# Patient Record
Sex: FEMALE | Race: WHITE | NOT HISPANIC OR LATINO | Employment: OTHER | URBAN - METROPOLITAN AREA
[De-identification: names, ages, dates, MRNs, and addresses within clinical notes are randomized per-mention and may not be internally consistent; named-entity substitution may affect disease eponyms.]

---

## 2017-02-16 ENCOUNTER — TELEPHONE (OUTPATIENT)
Dept: ANTICOAGULATION | Facility: CLINIC | Age: 67
End: 2017-02-16

## 2017-02-16 NOTE — TELEPHONE ENCOUNTER
Writer called patient and left a message that she is due for an INR check. Writer requested she call [facility] back when she received the message. [place] at this [place] - it is possible she had the lab work without them sending it to [facility]. Will await patient's call.    Yeni Payan RN  [facility]  [phone]

## 2017-03-01 ENCOUNTER — ANTICOAGULATION THERAPY VISIT (OUTPATIENT)
Dept: ANTICOAGULATION | Facility: CLINIC | Age: 67
End: 2017-03-01
Payer: COMMERCIAL

## 2017-03-01 DIAGNOSIS — Z79.01 LONG TERM CURRENT USE OF ANTICOAGULANT THERAPY: ICD-10-CM

## 2017-03-01 LAB — INR PPP: 3.6

## 2017-03-01 PROCEDURE — 99207 ZZC NO CHARGE NURSE ONLY: CPT | Performed by: REGISTERED NURSE

## 2017-03-01 NOTE — PROGRESS NOTES
ANTICOAGULATION FOLLOW-UP CLINIC VISIT    Patient Name:  Radha Corona  Date:  3/1/2017  Contact Type:  INR reported to this writer by Mary Ann MILLER RN. Writer called and left pt non-detailed VM on phone listed. Will await return call.     SUBJECTIVE:        OBJECTIVE    INR   Date Value Ref Range Status   03/01/2017 3.6  Final       ASSESSMENT / PLAN  No question data found.  Anticoagulation Summary as of 3/1/2017     INR goal 2.0-3.0   Today's INR 3.6!   Maintenance plan 2.5 mg (2.5 mg x 1) on Mon, Wed, Fri; 5 mg (2.5 mg x 2) all other days   Full instructions 2.5 mg on Mon, Wed, Fri; 5 mg all other days   Weekly total 27.5 mg   Plan last modified Melissa Mcmillan RN (3/24/2015)   Next INR check 3/2/2017   Priority INR   Target end date Indefinite    Indications   Cerebral infarction (H) [I63.9]  Atrial fibrillation [427.31] [I48.91]  Long term current use of anticoagulant therapy [Z79.01]         Anticoagulation Episode Summary     INR check location     Preferred lab     Send INR reminders to [facility] PO[facility]    Comments * 2.5mg tablets. 12/15: snowbird letter given, will be in [place] for 3 months      Anticoagulation Care Providers     Provider Role Specialty Phone number    Myrna Staples APRN CNP Responsible Nurse Practitioner [phone]            See the Encounter Report to view Anticoagulation Flowsheet and Dosing Calendar (Go to Encounters tab in chart review, and find the Anticoagulation Therapy Visit)    Susan Jeronimo RN

## 2017-03-01 NOTE — MR AVS SNAPSHOT
Radha Corona   3/1/2017   Anticoagulation Therapy Visit    Description:  66 year old female   Provider:  Susan Jeronimo, RN   Department:  [facility]           INR as of 3/1/2017     Today's INR 3.6!      Anticoagulation Summary as of 3/1/2017     INR goal 2.0-3.0   Today's INR 3.6!   Full instructions 2.5 mg on Mon, Wed, Fri; 5 mg all other days   Next INR check 3/2/2017    Indications   Cerebral infarction (H) [I63.9]  Atrial fibrillation [427.31] [I48.91]  Long term current use of anticoagulant therapy [Z79.01]         March 2017 Details    Sun Mon Tue Wed Thu Fri Sat        1      2.5 mg   See details      2            3               4                 5               6               7               8               9               10               11                 12               13               14               15               16               17               18                 19               20               21               22               23               24               25                 26               27               28               29               30               31                 Date Details   03/01 This INR check       Date of next INR:  3/2/2017         How to take your warfarin dose     To take:  2.5 mg Take 1 of the 2.5 mg tablets.    To take:  5 mg Take 2 of the 2.5 mg tablets.

## 2017-03-02 ENCOUNTER — ANTICOAGULATION THERAPY VISIT (OUTPATIENT)
Dept: ANTICOAGULATION | Facility: CLINIC | Age: 67
End: 2017-03-02
Payer: COMMERCIAL

## 2017-03-02 DIAGNOSIS — Z79.01 LONG TERM CURRENT USE OF ANTICOAGULANT THERAPY: ICD-10-CM

## 2017-03-02 PROCEDURE — 99207 ZZC NO CHARGE NURSE ONLY: CPT | Performed by: REGISTERED NURSE

## 2017-03-02 NOTE — MR AVS SNAPSHOT
Radha Corona   3/2/2017   Anticoagulation Therapy Visit    Description:  66 year old female   Provider:  Susan Jeronimo, RN   Department:  [facility]           INR as of 3/2/2017     Today's INR No new INR was available at the time of this encounter.      Anticoagulation Summary as of 3/2/2017     INR goal 2.0-3.0   Today's INR No new INR was available at the time of this encounter.   Full instructions 3/2: 2.5 mg; Otherwise 2.5 mg on Mon, Wed, Fri; 5 mg all other days   Next INR check 4/27/2017    Indications   Cerebral infarction (H) [I63.9]  Atrial fibrillation [427.31] [I48.91]  Long term current use of anticoagulant therapy [Z79.01]         Description     Try to have greens 1-2x/week.  Take 2.5mg Thurs 3/2/2017 then resume maintenance dose.  Recheck INR in 8 weeks.      March 2017 Details    Sun Mon Tue Wed Thu Fri Sat        1               2      2.5 mg   See details      3      2.5 mg         4      5 mg           5      5 mg         6      2.5 mg         7      5 mg         8      2.5 mg         9      5 mg         10      2.5 mg         11      5 mg           12      5 mg         13      2.5 mg         14      5 mg         15      2.5 mg         16      5 mg         17      2.5 mg         18      5 mg           19      5 mg         20      2.5 mg         21      5 mg         22      2.5 mg         23      5 mg         24      2.5 mg         25      5 mg           26      5 mg         27      2.5 mg         28      5 mg         29      2.5 mg         30      5 mg         31      2.5 mg           Date Details   03/02 This INR check               How to take your warfarin dose     To take:  2.5 mg Take 1 of the 2.5 mg tablets.    To take:  5 mg Take 2 of the 2.5 mg tablets.           April 2017 Details    Sun Mon Tue Wed Thu Fri Sat           1      5 mg           2      5 mg         3      2.5 mg         4      5 mg         5      2.5 mg         6      5 mg         7      2.5 mg         8      5  mg           9      5 mg         10      2.5 mg         11      5 mg         12      2.5 mg         13      5 mg         14      2.5 mg         15      5 mg           16      5 mg         17      2.5 mg         18      5 mg         19      2.5 mg         20      5 mg         21      2.5 mg         22      5 mg           23      5 mg         24      2.5 mg         25      5 mg         26      2.5 mg         27            28               29                 30                      Date Details   No additional details    Date of next INR:  4/27/2017         How to take your warfarin dose     To take:  2.5 mg Take 1 of the 2.5 mg tablets.    To take:  5 mg Take 2 of the 2.5 mg tablets.

## 2017-03-02 NOTE — PROGRESS NOTES
ANTICOAGULATION FOLLOW-UP CLINIC VISIT    Patient Name:  Radha Corona  Date:  3/2/2017  Contact Type:  Telephone/ pt returned call to [facility] today and discussed with writer    SUBJECTIVE:     Patient Findings     Positives Change in diet/appetite (not having usual veggies/greens. Fast food.), No Problem Findings    Comments Pt is in [place] and will return the end of March.           OBJECTIVE    INR   Date Value Ref Range Status   03/01/2017 3.6  Final       ASSESSMENT / PLAN  INR assessment SUPRA reduce dose x1 day   Recheck INR In: 8 WEEKS    INR Location Outside lab result from 3/1/17     Anticoagulation Summary as of 3/2/2017     INR goal 2.0-3.0   Today's INR No new INR was available at the time of this encounter.   Maintenance plan 2.5 mg (2.5 mg x 1) on Mon, Wed, Fri; 5 mg (2.5 mg x 2) all other days   Full instructions 3/2: 2.5 mg; Otherwise 2.5 mg on Mon, Wed, Fri; 5 mg all other days   Weekly total 27.5 mg   Plan last modified Melissa Mcmillan RN (3/24/2015)   Next INR check 4/27/2017   Priority INR   Target end date Indefinite    Indications   Cerebral infarction (H) [I63.9]  Atrial fibrillation [427.31] [I48.91]  Long term current use of anticoagulant therapy [Z79.01]         Anticoagulation Episode Summary     INR check location     Preferred lab     Send INR reminders to [facility]    Comments * 2.5mg tablets. 12/15: snowbird letter given, will be in [place] for 3 months      Anticoagulation Care Providers     Provider Role Specialty Phone number    Myrna Staples APRN CNP Responsible Nurse Practitioner [phone]            See the Encounter Report to view Anticoagulation Flowsheet and Dosing Calendar (Go to Encounters tab in chart review, and find the Anticoagulation Therapy Visit)    Susan Jeronimo RN

## 2017-03-03 DIAGNOSIS — I48.20 CHRONIC ATRIAL FIBRILLATION (H): ICD-10-CM

## 2017-03-03 DIAGNOSIS — Z79.01 LONG TERM (CURRENT) USE OF ANTICOAGULANTS: ICD-10-CM

## 2017-03-03 RX ORDER — WARFARIN SODIUM 2.5 MG/1
TABLET ORAL
Qty: 132 TABLET | Refills: 0 | Status: SHIPPED | OUTPATIENT
Start: 2017-03-03 | End: 2017-05-15

## 2017-03-03 NOTE — TELEPHONE ENCOUNTER
Warfarin    Last Written Prescription Date: 12/15/16  Last Fill Qty: 140, # refills: 0  Last Office Visit with G, P or [facility] prescribing provider: 12/13/16       Date and Result of Last PT/INR:   Lab Results   Component Value Date    INR 3.6 03/01/2017    INR 2.8 12/15/2016    INR 2.8 11/03/2016    INR 2.9 11/12/2009    INR 2.9 11/12/2009

## 2017-04-19 ENCOUNTER — TRANSFERRED RECORDS (OUTPATIENT)
Dept: HEALTH INFORMATION MANAGEMENT | Facility: CLINIC | Age: 67
End: 2017-04-19

## 2017-05-03 ENCOUNTER — ANTICOAGULATION THERAPY VISIT (OUTPATIENT)
Dept: ANTICOAGULATION | Facility: CLINIC | Age: 67
End: 2017-05-03
Payer: COMMERCIAL

## 2017-05-03 DIAGNOSIS — I48.91 ATRIAL FIBRILLATION (H): ICD-10-CM

## 2017-05-03 DIAGNOSIS — Z79.01 LONG TERM CURRENT USE OF ANTICOAGULANT THERAPY: ICD-10-CM

## 2017-05-03 DIAGNOSIS — I63.9 CEREBRAL INFARCTION (H): ICD-10-CM

## 2017-05-03 LAB — INR POINT OF CARE: 2.9 (ref 0.86–1.14)

## 2017-05-03 PROCEDURE — 36416 COLLJ CAPILLARY BLOOD SPEC: CPT

## 2017-05-03 PROCEDURE — 85610 PROTHROMBIN TIME: CPT | Mod: QW

## 2017-05-03 PROCEDURE — 99207 ZZC NO CHARGE NURSE ONLY: CPT

## 2017-05-03 NOTE — PROGRESS NOTES
ANTICOAGULATION FOLLOW-UP CLINIC VISIT    Patient Name:  Radha Corona  Date:  5/3/2017  Contact Type:  Face to Face    SUBJECTIVE:     Patient Findings     Positives No Problem Findings           OBJECTIVE    INR Protime   Date Value Ref Range Status   05/03/2017 2.9 (A) 0.86 - 1.14 Final       ASSESSMENT / PLAN  INR assessment THER    Recheck INR In: 8 WEEKS    INR Location Clinic      Anticoagulation Summary as of 5/3/2017     INR goal 2.0-3.0   Today's INR 2.9   Maintenance plan 2.5 mg (2.5 mg x 1) on Mon, Wed, Fri; 5 mg (2.5 mg x 2) all other days   Full instructions 2.5 mg on Mon, Wed, Fri; 5 mg all other days   Weekly total 27.5 mg   No change documented Mary Ann Meng, RN   Plan last modified Melissa Mcmillan RN (3/24/2015)   Next INR check 6/28/2017   Priority INR   Target end date Indefinite    Indications   Cerebral infarction (H) [I63.9]  Atrial fibrillation [427.31] [I48.91]  Long term current use of anticoagulant therapy [Z79.01]         Anticoagulation Episode Summary     INR check location     Preferred lab     Send INR reminders to [facility]    Comments * 2.5mg tablets. 12/15: snowbird letter given, will be in [place] for 3 months      Anticoagulation Care Providers     Provider Role Specialty Phone number    Myrna Staples, JANICE CNP Responsible Nurse Practitioner [phone]            See the Encounter Report to view Anticoagulation Flowsheet and Dosing Calendar (Go to Encounters tab in chart review, and find the Anticoagulation Therapy Visit)        Mary Ann Meng, RN

## 2017-05-09 ENCOUNTER — TELEPHONE (OUTPATIENT)
Dept: DERMATOLOGY | Facility: CLINIC | Age: 67
End: 2017-05-09

## 2017-05-09 ENCOUNTER — OFFICE VISIT (OUTPATIENT)
Dept: FAMILY MEDICINE | Facility: CLINIC | Age: 67
End: 2017-05-09
Payer: COMMERCIAL

## 2017-05-09 ENCOUNTER — OFFICE VISIT (OUTPATIENT)
Dept: DERMATOLOGY | Facility: CLINIC | Age: 67
End: 2017-05-09
Payer: COMMERCIAL

## 2017-05-09 VITALS
WEIGHT: 226 LBS | TEMPERATURE: 97.4 F | DIASTOLIC BLOOD PRESSURE: 86 MMHG | HEART RATE: 80 BPM | SYSTOLIC BLOOD PRESSURE: 136 MMHG | BODY MASS INDEX: 34.36 KG/M2

## 2017-05-09 VITALS — DIASTOLIC BLOOD PRESSURE: 67 MMHG | SYSTOLIC BLOOD PRESSURE: 113 MMHG | OXYGEN SATURATION: 97 % | HEART RATE: 70 BPM

## 2017-05-09 DIAGNOSIS — D23.5 BENIGN NEOPLASM OF SKIN OF TRUNK, EXCEPT SCROTUM: ICD-10-CM

## 2017-05-09 DIAGNOSIS — L82.0 INFLAMED SEBORRHEIC KERATOSIS: ICD-10-CM

## 2017-05-09 DIAGNOSIS — Z11.59 NEED FOR HEPATITIS C SCREENING TEST: ICD-10-CM

## 2017-05-09 DIAGNOSIS — L73.8 SENILE SEBACEOUS GLAND HYPERPLASIA: ICD-10-CM

## 2017-05-09 DIAGNOSIS — E03.8 OTHER SPECIFIED HYPOTHYROIDISM: ICD-10-CM

## 2017-05-09 DIAGNOSIS — L81.4 LENTIGO: ICD-10-CM

## 2017-05-09 DIAGNOSIS — L82.1 SK (SEBORRHEIC KERATOSIS): Primary | ICD-10-CM

## 2017-05-09 DIAGNOSIS — H10.11 ALLERGIC CONJUNCTIVITIS, RIGHT EYE: ICD-10-CM

## 2017-05-09 DIAGNOSIS — E78.5 HYPERLIPIDEMIA LDL GOAL <100: Primary | ICD-10-CM

## 2017-05-09 DIAGNOSIS — C44.319 BASAL CELL CARCINOMA, FOREHEAD: ICD-10-CM

## 2017-05-09 DIAGNOSIS — D18.00 ANGIOMA: ICD-10-CM

## 2017-05-09 DIAGNOSIS — Z12.11 SPECIAL SCREENING FOR MALIGNANT NEOPLASMS, COLON: ICD-10-CM

## 2017-05-09 PROCEDURE — 99214 OFFICE O/P EST MOD 30 MIN: CPT | Performed by: NURSE PRACTITIONER

## 2017-05-09 PROCEDURE — 99203 OFFICE O/P NEW LOW 30 MIN: CPT | Mod: 25 | Performed by: DERMATOLOGY

## 2017-05-09 PROCEDURE — 11100 CL FROZEN SECTION FIRST SPEC: CPT | Mod: 59 | Performed by: DERMATOLOGY

## 2017-05-09 PROCEDURE — 88331 PATH CONSLTJ SURG 1 BLK 1SPC: CPT | Performed by: DERMATOLOGY

## 2017-05-09 PROCEDURE — 17110 DESTRUCTION B9 LES UP TO 14: CPT | Performed by: DERMATOLOGY

## 2017-05-09 NOTE — PATIENT INSTRUCTIONS
Wound Care Instructions     FOR SUPERFICIAL WOUNDS     [facility] [phone]    [facility] [phone]                       AFTER 24 HOURS YOU SHOULD REMOVE THE BANDAGE AND BEGIN DAILY DRESSING CHANGES AS FOLLOWS:     1) Remove Dressing.     2) Clean and dry the area with tap water using a Q-tip or sterile gauze pad.     3) Apply Vaseline, Aquaphor, Polysporin ointment or Bacitracin ointment over entire wound.  Do NOT use Neosporin ointment.     4) Cover the wound with a band-aid, or a sterile non-stick gauze pad and micropore paper tape      REPEAT THESE INSTRUCTIONS AT LEAST ONCE A DAY UNTIL THE WOUND HAS COMPLETELY HEALED.    It is an old wives tale that a wound heals better when it is exposed to air and allowed to dry out. The wound will heal faster with a better cosmetic result if it is kept moist with ointment and covered with a bandage.    **Do not let the wound dry out.**      Supplies Needed:      *Cotton tipped applicators (Q-tips)    *Polysporin Ointment or Bacitracin Ointment (NOT NEOSPORIN)    *Band-aids or non-stick gauze pads and micropore paper tape.      PATIENT INFORMATION:    During the healing process you will notice a number of changes. All wounds develop a small halo of redness surrounding the wound.  This means healing is occurring. Severe itching with extensive redness usually indicates sensitivity to the ointment or bandage tape used to dress the wound.  You should call our office if this develops.      Swelling  and/or discoloration around your surgical site is common, particularly when performed around the eye.    All wounds normally drain.  The larger the wound the more drainage there will be.  After 7-10 days, you will notice the wound beginning to shrink and new skin will begin to grow.  The wound is healed when you can see skin has formed over the entire area.  A healed wound has a healthy, shiny look to the surface and is red to dark pink in color  to normalize.  Wounds may take approximately 4-6 weeks to heal.  Larger wounds may take 6-8 weeks.  After the wound is healed you may discontinue dressing changes.    You may experience a sensation of tightness as your wound heals. This is normal and will gradually subside.    Your healed wound may be sensitive to temperature changes. This sensitivity improves with time, but if you re having a lot of discomfort, try to avoid temperature extremes.    Patients frequently experience itching after their wound appears to have healed because of the continue healing under the skin.  Plain Vaseline will help relieve the itching.        POSSIBLE COMPLICATIONS    BLEEDIN. Leave the bandage in place.  2. Use tightly rolled up gauze or a cloth to apply direct pressure over the bandage for 30  minutes.  3. Reapply pressure for an additional 30 minutes if necessary  4. Use additional gauze and tape to maintain pressure once the bleeding has stopped.      WOUND CARE INSTRUCTIONS   FOR CRYOSURGERY   This area treated with liquid nitrogen will form a blister. You do not need to bandage the area until after the blister forms and breaks (which may be a few days). When the blister breaks, begin daily dressing changes as follows:   1) Clean and dry the area with tap water using clean Q-tip or sterile gauze pad.   2) Apply Polysporin ointment or Bacitracin ointment over entire wound. Do NOT use Neosporin ointment.   3) Cover the wound with a band-aid or sterile non-stick gauze pad and micropore paper tape.   REPEAT THESE INSTRUCTIONS AT LEAST ONCE A DAY UNTIL THE WOUND HAS COMPLETELY HEALED.   It is an old wives tale that a wound heals better when it is exposed to air and allowed to dry out. The wound will heal faster with a better cosmetic result if it is kept moist with ointment and covered with a bandage.   Do not let the wound dry out.   IMPORTANT INFORMATION ON REVERSE SIDE   Supplies Needed:   *Cotton tipped applicators  (Q-tips)   *Polysporin ointment or Bacitracin ointment (NOT NEOSPORIN)   *Band-aids, or non stick gauze pads and micropore paper tape   PATIENT INFORMATION   During the healing process you will notice a number of changes. All wounds develop a small halo of redness surrounding the wound. This means healing is occurring. Severe itching with extensive redness usually indicates sensitivity to the ointment or bandage tape used to dress the wound. You should call our office if this develops.   Swelling and/or discoloration around your surgical site is common, particularly when performed around the eye.   All wounds normally drain. The larger the wound the more drainage there will be. After 7-10 days, you will notice the wound beginning to shrink and new skin will begin to grow. The wound is healed when you can see skin has formed over the entire area. A healed wound has a healthy, shiny look to the surface and is red to dark pink in color to normalize. Wounds may take approximately 4-6 weeks to heal. Larger wounds may take 6-8 weeks. After the wound is healed you may discontinue dressing changes.   You may experience a sensation of tightness as your wound heals. This is normal and will gradually subside.   Your healed wound may be sensitive to temperature changes. This sensitivity improves with time, but if you re having a lot of discomfort, try to avoid temperature extremes.   Patients frequently experience itching after their wound appears to have healed because of the continue healing under the skin. Plain Vaseline will help relieve the itching.

## 2017-05-09 NOTE — PROGRESS NOTES
Radha Corona is a 67 year old year old female patient here today for mole son body.   .  Patient states this has been present for a while .  Patient reports the following symptoms:  itching.  Patient reports the following previous treatments none.  Patient reports the following modifying factors none.  Associated symptoms: none.  Patient has no other skin complaints today.  Remainder of the HPI, Meds, PMH, Allergies, FH, and SH was reviewed in chart.      Past Medical History:   Diagnosis Date     Abscess of intestine 8/3/07    ptl colectomy     Displacement of lumbar intervertebral disc without myelopathy 8/3/07     GERD (gastroesophageal reflux disease)      Malignant neoplasm of breast (female), unspecified site 8/3/07     Migraine, unspecified, without mention of intractable migraine without mention of status migrainosus      Other and unspecified hyperlipidemia 8/3/07       Past Surgical History:   Procedure Laterality Date     ARTHROSCOPY KNEE RT/LT  4/2005     BREAST LUMPECTOMY, RT/LT  2/7/06    Right Breast Lumpectomy     C ORAL SURGERY PROCEDURE      wisdom teeth extraction     COLECTOMY      Reversal of Colostomy 8/05     D & C       HC SACROPLASTY  2001    Hx of Spine Surgery L4-5     HYSTERECTOMY, DIOMEDES  5/2005        Family History   Problem Relation Age of Onset     Breast Cancer Mother      DIABETES Father      Hypertension Father      Lipids Father      Neurologic Disorder Daughter      Migraines     Neurologic Disorder Brother      CANCER No family hx of      no hx skin cancer       Social History     Social History     Marital status:      Spouse name: N/A     Number of children: N/A     Years of education: N/A     Occupational History     Not on file.     Social History Main Topics     Smoking status: Never Smoker     Smokeless tobacco: Never Used     Alcohol use Yes      Comment: wine occasionally     Drug use: No     Sexual activity: Yes     Partners: Male     Other Topics Concern      Parent/Sibling W/ Cabg, Mi Or Angioplasty Before 65f 55m? No     Social History Narrative       Outpatient Encounter Prescriptions as of 5/9/2017   Medication Sig Dispense Refill     warfarin (JANTOVEN) 2.5 MG tablet Take 2.5mg by mouth on Mon, Wed, Fri; 5mg all other days or as directed by Anticoagulation Clinic 132 tablet 0     levothyroxine (SYNTHROID/LEVOTHROID) 75 MCG tablet Take 1 tablet (75 mcg) by mouth daily 90 tablet 3     simvastatin (ZOCOR) 40 MG tablet Take 1 tablet (40 mg) by mouth At Bedtime 90 tablet 3     acetaminophen-codeine (TYLENOL/CODEINE #3) 300-30 MG per tablet Take 1-2 tablets by mouth daily as needed for pain 30 tablet 0     furosemide (LASIX) 40 MG tablet Take 1 tablet (40 mg) by mouth daily as needed 90 tablet 3     metoprolol (LOPRESSOR) 50 MG tablet Take 1 tablet (50 mg) by mouth 2 times daily 180 tablet 3     ORDER FOR DME Equipment being ordered: insole Arch supports 1 Box 0     ORDER FOR DME Equipment being ordered: wrist splints for CTS 2 Units 0     ORDER FOR DME Equipment being ordered: specialized orthotics for feet 1 each 0     No facility-administered encounter medications on file as of 5/9/2017.              Review Of Systems  Skin: As above  Eyes: negative  Ears/Nose/Throat: negative  Respiratory: No shortness of breath, dyspnea on exertion, cough, or hemoptysis  Cardiovascular: negative  Gastrointestinal: negative  Genitourinary: negative  Musculoskeletal: negative  Neurologic: negative  Psychiatric: negative  Hematologic/Lymphatic/Immunologic: negative  Endocrine: negative      O:   NAD, WDWN, Alert & Oriented, Mood & Affect wnl, Vitals stable   Here today alone   /67  Pulse 70  SpO2 97%   General appearance normal   Vitals stable   Alert, oriented and in no acute distress      Following lymph nodes palpated: Occipital, Cervical, Supraclavicular no lad   Stuck on papules and brown macules on trunk and ext    L breast, L flank, and back with inflamed seborrheic  keratosis    Yellow papules on face   Red papules on trunk    L forehead 4mm pink pearly papule         The remainder of the full exam was unremarkable; the following areas were examined:  conjunctiva/lids, oral mucosa, neck, peripheral vascular system, abdomen, lymph nodes, digits/nails, eccrine and apocrine glands, scalp/hair, face, neck, chest, abdomen, , back, RUE, LUE,       Eyes: Conjunctivae/lids:Normal     ENT: Lips, buccal mucosa, tongue: normal    MSK:Normal    Cardiovascular: peripheral edema none    Pulm: Breathing Normal    Lymph Nodes: No Head and Neck Lymphadenopathy     Neuro/Psych: Orientation:Normal; Mood/Affect:Normal      MICRO:   L forehead:Orthokeratosis of epidermis with a proliferation of nests of basaloid cells, with peripheral palisading and a haphazard arrangement in the center extending into the dermis, forming nodules.  The tumor cells have hyperchromatic nuclei. Poor cytoplasm and intercellular bridging.    A/P:  1. L forehead r/o basal cell carcinoma  TANGENTIAL BIOPSY IN HOUSE:  After consent, anesthesia with LEC and prep, tangential excision performed and dx above confirmed with frozen section histology.  No complications and routine wound care.  Patient told result basal cell carcinoma schedule excision .    2. Seborrheic keratosis, lentigo, angioma, sebaceous hyperplasia   3. Inflamed seborrheic keratosis   L flankx1, L breastx1, back x7  LN2:  Treated with LN2 for 5s for 1-2 cycles. Warned risks of blistering, pain, pigment change, scarring, and incomplete resolution.  Advised patient to return if lesions do not completely resolve.  Wound care sheet given.    BENIGN LESIONS DISCUSSED WITH PATIENT:  I discussed the specifics of tumor, prognosis, and genetics of benign lesions.  I explained that treatment of these lesions would be purely cosmetic and not medically neccessary.  I discussed with patient different removal options including excision, cautery and /or laser.      Nature  and genetics of benign skin lesions dicussed with patient.  Signs and Symptoms of skin cancer discussed with patient.  Patient encouraged to perform monthly skin exams.  UV precautions reviewed with patient.  Skin care regimen reviewed with patient: Eliminate harsh soaps, i.e. Dial, zest, irsih spring; Mild soaps such as Cetaphil or Dove sensitive skin, avoid hot or cold showers, aggressive use of emollients including vanicream, cetaphil or cerave discussed with patient.    Risks of non-melanoma skin cancer discussed with patient   Return to clinic 6 months

## 2017-05-09 NOTE — NURSING NOTE
Chief Complaint   Patient presents with     Derm Problem     mole check       Vitals:    05/09/17 0811   BP: 113/67   Pulse: 70   SpO2: 97%     Wt Readings from Last 1 Encounters:   05/09/17 102.5 kg (226 lb)       Vilma Rivas LPN.................5/9/2017

## 2017-05-09 NOTE — PROGRESS NOTES
SUBJECTIVE:                                                    Radha Corona is a 67 year old female who presents to clinic today for the following health issues:      Wants moles checked on back- getting bigger and caught on clothing.    Right eye- feels irritated after pulling weeds- no redness.     Hypothyroidism: needs updated TSH    Would like Hep C screening done- no concerns for Hep C    Due for colonoscopy - will need to stop warfarin prior to procedure.     HLD: Needs updated cholesterol labs. No side effects with statin.         -------------------------------------    Problem list and histories reviewed & adjusted, as indicated.  Additional history: as documented    Patient Active Problem List   Diagnosis     Lumbar Radiculopathy- s/p fusion at L4-5.     CTS (carpal tunnel syndrome)     Neuropathy (H)     Cerebral infarction (H)     Osteoarthritis     Arthropathy     Grave's disease     Edema leg     Postablative hypothyroidism     Advanced directives, counseling/discussion     Hyperlipidemia LDL goal <100     Breast cancer (H)     Chronic atrial fibrillation (H)     Generalized osteoarthrosis, unspecified site     HX: breast cancer     Seasonal allergies     Atrial fibrillation [427.31]     Long term current use of anticoagulant therapy     Migraine without status migrainosus, not intractable, unspecified migraine type     Past Surgical History:   Procedure Laterality Date     ARTHROSCOPY KNEE RT/LT  4/2005     BREAST LUMPECTOMY, RT/LT  2/7/06    Right Breast Lumpectomy     C ORAL SURGERY PROCEDURE      wisdom teeth extraction     COLECTOMY      Reversal of Colostomy 8/05     D & C        SACROPLASTY  2001    Hx of Spine Surgery L4-5     HYSTERECTOMY, DIOMEDES  5/2005       Social History   Substance Use Topics     Smoking status: Never Smoker     Smokeless tobacco: Never Used     Alcohol use Yes      Comment: wine occasionally     Family History   Problem Relation Age of Onset     Breast Cancer  Mother      DIABETES Father      Hypertension Father      Lipids Father      Neurologic Disorder Daughter      Migraines     Neurologic Disorder Brother      CANCER No family hx of      no hx skin cancer           Reviewed and updated as needed this visit by clinical staff       Reviewed and updated as needed this visit by Provider         ROS:  Constitutional, HEENT, cardiovascular, pulmonary, GI, , musculoskeletal, neuro, skin, endocrine and psych systems are negative, except as otherwise noted.    OBJECTIVE:                                                    /86 (BP Location: Left arm, Patient Position: Chair, Cuff Size: Adult Large)  Pulse 80  Temp 97.4  F (36.3  C) (Tympanic)  Wt 226 lb (102.5 kg)  BMI 34.36 kg/m2  Body mass index is 34.36 kg/(m^2).  GENERAL: healthy, alert and no distress  HENT: PEARLA- conjunctivae - normal   NECK: no adenopathy, no asymmetry, masses, or scars and thyroid normal to palpation  RESP: lungs clear to auscultation - no rales, rhonchi or wheezes  CV: regular rate and rhythm, normal S1 S2, no S3 or S4, no murmur, click or rub,   MS: no gross musculoskeletal defects noted, no edema  SKIN: multiple moles on trunk -     Diagnostic Test Results:  none      ASSESSMENT/PLAN:                                                        1. Benign neoplasm of skin of trunk, except scrotum  - DERMATOLOGY REFERRAL for skin check and removal of moles- interfering with clothing    2. Hyperlipidemia LDL goal <100  Due to have lipids checked  Tolerating statin  - Lipid panel reflex to direct LDL; Future  - ALT; Future    3. Other specified hypothyroidism  Tolerating Levothyroxine- due to have TSH level checked  - Lipid panel reflex to direct LDL; Future  - TSH with free T4 reflex; Future    4. Screening for malignant neoplasm of colon    - GASTROENTEROLOGY ADULT REF PROCEDURE ONLY- patient will need to hold warfarin prior to procedure- once she has a time scheduled will give her instructions  to hold warfarin- she may require bridging     5. Need for hepatitis C screening test    - Hepatitis C antibody; Future    6. Allergic conjunctivitis right eye  - eye is not reddened  -no foreign object noted  - recommend using allergy eye drops/saline eye drops.    Follow up in 1 month to discuss results of lab work    JANICE Nichole [facility]

## 2017-05-09 NOTE — LETTER
[facility]  [address] [facility] [address]  Phone: [phone]    May 11, 2017    Radha Corona  [address] [place] [id number]              Dear Ms. Corona,    You are scheduled for Mohs Surgery on Wednesday May 17 th at 7: 45 am.     Please check in at Dermatology Clinic.   (2nd Floor, last  Clinic on right up staircase or elevator -past OB/GYN clinic)    You don't need to arrive more than 5-10 minutes prior to your appointment time.     Be sure to eat a good breakfast and bathe and wash your hair prior to Surgery.    If you are taking any anti-coagulants that are prescribed by your Doctor (such as Coumadin/warfarin, Plavix, Aspirin, Ibuprofen), please continue taking them.     However, If you are taking anti-coagulants over the counter without  a Doctor's order for a Medical condition, please discontinue them 10 days prior to Surgery.      Please wear loose comfortable clothing as it could possibly be 4-6 hours until your surgery is completed depending upon how many layers of tissue need to be removed.     Wi-fi access is available.     Sincerely,      Edy Dove MD/ Sabina Mata RN

## 2017-05-09 NOTE — PATIENT INSTRUCTIONS
1. Schedule an appointment with dermatology  2. Schedule follow up visit and fasting lab work          Thank you for choosing [facility].  You may be receiving a survey in the mail from Tatyana Garcias regarding your visit today.  Please take a few minutes to complete and return the survey to let us know how we are doing.      If you have questions or concerns, please contact us via Murfie or you can contact your care team at [phone].    Our Clinic hours are:  Monday 6:40 am  to 7:00 pm  Tuesday -Friday 6:40 am to 5:00 pm    The [place] outpatient lab hours are:  Monday - Friday 6:10 am to 4:45 pm  Saturdays 7:00 am to 11:00 am  Appointments are required, call [phone]    If you have clinical questions after hours or would like to schedule an appointment,  call the clinic at [phone].

## 2017-05-09 NOTE — MR AVS SNAPSHOT
After Visit Summary   5/9/2017    Radha Corona    MRN: [id number]           Patient Information     Date Of Birth          1950        Visit Information        Provider Department      5/9/2017 7:20 AM Myrna Staples APRN [facility]        Today's Diagnoses     Hyperlipidemia LDL goal <100    -  1    Other specified hypothyroidism        Paroxysmal atrial fibrillation (H)        Benign neoplasm of skin of trunk, except scrotum          Care Instructions    1. Schedule an appointment with dermatology  2. Schedule follow up visit and fasting lab work          Thank you for choosing [facility].  You may be receiving a survey in the mail from [employer] regarding your visit today.  Please take a few minutes to complete and return the survey to let us know how we are doing.      If you have questions or concerns, please contact us via Markr or you can contact your care team at [phone].    Our Clinic hours are:  Monday 6:40 am  to 7:00 pm  Tuesday -Friday 6:40 am to 5:00 pm    The [place] outpatient lab hours are:  Monday - Friday 6:10 am to 4:45 pm  Saturdays 7:00 am to 11:00 am  Appointments are required, call [phone]    If you have clinical questions after hours or would like to schedule an appointment,  call the clinic at [phone].        Follow-ups after your visit        Additional Services     DERMATOLOGY REFERRAL       Your provider has referred you to: [facility]: [facility] [phone]   [email or web address]    Please be aware that coverage of these services is subject to the terms and limitations of your health insurance plan.  Call member services at your health plan with any benefit or coverage questions.      Please bring the following with you to your appointment:    (1) Any X-Rays, CTs or MRIs which have been performed.  Contact the facility where they were done to arrange for  prior to  your scheduled appointment.    (2) List of current medications  (3) This referral request   (4) Any documents/labs given to you for this referral            GASTROENTEROLOGY ADULT REF PROCEDURE ONLY       Last Lab Result: Creatinine (mg/dL)       Date                     Value                 05/23/2016               0.82             ----------  Body mass index is 34.36 kg/(m^2).     Needed:  No  Language:  English    Patient will be contacted to schedule procedure.     Please be aware that coverage of these services is subject to the terms and limitations of your health insurance plan.  Call member services at your health plan with any benefit or coverage questions.  Any procedures must be performed at a [facility] facility OR coordinated by your clinic's referral office.    Please bring the following with you to your appointment:    (1) Any X-Rays, CTs or MRIs which have been performed.  Contact the facility where they were done to arrange for  prior to your scheduled appointment.    (2) List of current medications   (3) This referral request   (4) Any documents/labs given to you for this referral                  Future tests that were ordered for you today     Open Future Orders        Priority Expected Expires Ordered    Lipid panel reflex to direct LDL Routine  6/9/2017 5/9/2017    TSH with free T4 reflex Routine  6/9/2017 5/9/2017    Basic metabolic panel Routine  6/9/2017 5/9/2017    ALT Routine  6/9/2017 5/9/2017            Who to contact     If you have questions or need follow up information about today's clinic visit or your schedule please contact [facility] directly at [phone].  Normal or non-critical lab and imaging results will be communicated to you by MyChart, letter or phone within 4 business days after the clinic has received the results. If you do not hear from us within 7 days, please contact the clinic through MyChart or phone. If you have a critical or  "abnormal lab result, we will notify you by phone as soon as possible.  Submit refill requests through [employer] or call your pharmacy and they will forward the refill request to us. Please allow 3 business days for your refill to be completed.          Additional Information About Your Visit        MyChart Information     [employer] lets you send messages to your doctor, view your test results, renew your prescriptions, schedule appointments and more. To sign up, go to [email or web address] . Click on \"Log in\" on the left side of the screen, which will take you to the Welcome page. Then click on \"Sign up Now\" on the right side of the page.     You will be asked to enter the access code listed below, as well as some personal information. Please follow the directions to create your username and password.     Your access code is: [email or web address]  Expires: 2017  7:54 AM     Your access code will  in 90 days. If you need help or a new code, please call your [facility] or [phone].        Care EveryWhere ID     This is your Care EveryWhere ID. This could be used by other organizations to access your [facility] medical records  [id number]        Your Vitals Were     Pulse Temperature BMI (Body Mass Index)             80 97.4  F (36.3  C) (Tympanic) 34.36 kg/m2          Blood Pressure from Last 3 Encounters:   17 136/86   16 113/69   10/24/16 128/81    Weight from Last 3 Encounters:   17 226 lb (102.5 kg)   16 221 lb 9.6 oz (100.5 kg)   10/24/16 223 lb (101.2 kg)              We Performed the Following     DERMATOLOGY REFERRAL     GASTROENTEROLOGY ADULT REF PROCEDURE ONLY        Primary Care Provider Office Phone # Fax #    MyrnaJANICE Meeks [facility] [phone]       [facility] [address] [facility] [address]        Thank you!     Thank you for choosing [facility]  for your care. Our goal is always to provide you with excellent care. Hearing back " from our patients is one way we can continue to improve our services. Please take a few minutes to complete the written survey that you may receive in the mail after your visit with us. Thank you!             Your Updated Medication List - Protect others around you: Learn how to safely use, store and throw away your medicines at www.disposemymeds.org.          This list is accurate as of: 5/9/17  7:54 AM.  Always use your most recent med list.                   Brand Name Dispense Instructions for use    acetaminophen-codeine 300-30 MG per tablet    TYLENOL/codeine #3    30 tablet    Take 1-2 tablets by mouth daily as needed for pain       furosemide 40 MG tablet    LASIX    90 tablet    Take 1 tablet (40 mg) by mouth daily as needed       levothyroxine 75 MCG tablet    SYNTHROID/LEVOTHROID    90 tablet    Take 1 tablet (75 mcg) by mouth daily       metoprolol 50 MG tablet    LOPRESSOR    180 tablet    Take 1 tablet (50 mg) by mouth 2 times daily       * order for DME     1 each    Equipment being ordered: specialized orthotics for feet       * order for DME     2 Units    Equipment being ordered: wrist splints for CTS       * order for DME     1 Box    Equipment being ordered: insole Arch supports       simvastatin 40 MG tablet    ZOCOR    90 tablet    Take 1 tablet (40 mg) by mouth At Bedtime       warfarin 2.5 MG tablet    JANTOVEN    132 tablet    Take 2.5mg by mouth on Mon, Wed, Fri; 5mg all other days or as directed by Anticoagulation Clinic       * Notice:  This list has 3 medication(s) that are the same as other medications prescribed for you. Read the directions carefully, and ask your doctor or other care provider to review them with you.

## 2017-05-09 NOTE — TELEPHONE ENCOUNTER
----- Message from Edy Dove MD sent at 5/9/2017  9:38 AM CDT -----  Forehead basal cell carcinoma schedule excision

## 2017-05-09 NOTE — NURSING NOTE
"Initial /86 (BP Location: Left arm, Patient Position: Chair, Cuff Size: Adult Large)  Pulse 80  Temp 97.4  F (36.3  C) (Tympanic)  Wt 226 lb (102.5 kg)  BMI 34.36 kg/m2 Estimated body mass index is 34.36 kg/(m^2) as calculated from the following:    Height as of 7/24/15: 5' 8\" (1.727 m).    Weight as of this encounter: 226 lb (102.5 kg). .    Lakesha Whitfield    "

## 2017-05-09 NOTE — TELEPHONE ENCOUNTER
Left message on machine to call back.    Pt needs to be scheduled for MOHS procedure.   Claudette PRETTY RN  Specialty Flex

## 2017-05-11 NOTE — TELEPHONE ENCOUNTER
Patient notified. Patient verbalized understanding. Scheduled for MOHS Surgery. Mohs brochure/Pre-op letter sent.   Sabina Mata RN

## 2017-05-15 DIAGNOSIS — Z79.01 LONG TERM (CURRENT) USE OF ANTICOAGULANTS: ICD-10-CM

## 2017-05-15 DIAGNOSIS — I48.20 CHRONIC ATRIAL FIBRILLATION (H): ICD-10-CM

## 2017-05-15 NOTE — TELEPHONE ENCOUNTER
Jantoven   Last Written Prescription Date: 03/03/17  Last Fill Qty: 32, # refills: 00  Last Office Visit with G, [facility] or [facility] prescribing provider: 05/09/17       Date and Result of Last PT/INR:   Lab Results   Component Value Date    INR 2.9 05/03/2017    INR 3.6 03/01/2017    INR 2.8 12/15/2016    INR 2.9 11/12/2009    INR 2.9 11/12/2009

## 2017-05-16 RX ORDER — WARFARIN SODIUM 2.5 MG/1
TABLET ORAL
Qty: 132 TABLET | Refills: 0 | Status: SHIPPED | OUTPATIENT
Start: 2017-05-16 | End: 2017-08-21

## 2017-05-17 ENCOUNTER — OFFICE VISIT (OUTPATIENT)
Dept: DERMATOLOGY | Facility: CLINIC | Age: 67
End: 2017-05-17
Payer: COMMERCIAL

## 2017-05-17 VITALS — HEART RATE: 85 BPM | SYSTOLIC BLOOD PRESSURE: 124 MMHG | DIASTOLIC BLOOD PRESSURE: 70 MMHG | OXYGEN SATURATION: 95 %

## 2017-05-17 DIAGNOSIS — L82.1 SK (SEBORRHEIC KERATOSIS): ICD-10-CM

## 2017-05-17 DIAGNOSIS — D18.00 ANGIOMA: ICD-10-CM

## 2017-05-17 DIAGNOSIS — C44.319 BASAL CELL CARCINOMA, FOREHEAD: Primary | ICD-10-CM

## 2017-05-17 DIAGNOSIS — L81.4 LENTIGO: ICD-10-CM

## 2017-05-17 PROCEDURE — 17311 MOHS 1 STAGE H/N/HF/G: CPT | Performed by: DERMATOLOGY

## 2017-05-17 PROCEDURE — 13132 CMPLX RPR F/C/C/M/N/AX/G/H/F: CPT | Performed by: DERMATOLOGY

## 2017-05-17 PROCEDURE — 99213 OFFICE O/P EST LOW 20 MIN: CPT | Mod: 25 | Performed by: DERMATOLOGY

## 2017-05-17 NOTE — PROGRESS NOTES
Radha oCrona is a 67 year old year old female patient here today for evaluation and managment of basal cell carcinoma on forehead.  Today she notes brown spots on back an dlegs, she would like checked.   .  Patient states this has been present for years.  Patient reports the following symptoms:  none .  Patient reports the following previous treatments none.  Patient reports the following modifying factors none.  Associated symptoms: none.  Patient has no other skin complaints today.  Remainder of the HPI, Meds, PMH, Allergies, FH, and SH was reviewed in chart.    Pertinent Hx:   Non-melanoma skin cancer   Past Medical History:   Diagnosis Date     Abscess of intestine 8/3/07    ptl colectomy     Basal cell carcinoma      Displacement of lumbar intervertebral disc without myelopathy 8/3/07     GERD (gastroesophageal reflux disease)      Malignant neoplasm of breast (female), unspecified site 8/3/07     Migraine, unspecified, without mention of intractable migraine without mention of status migrainosus      Other and unspecified hyperlipidemia 8/3/07       Past Surgical History:   Procedure Laterality Date     ARTHROSCOPY KNEE RT/LT  4/2005     BREAST LUMPECTOMY, RT/LT  2/7/06    Right Breast Lumpectomy     C ORAL SURGERY PROCEDURE      wisdom teeth extraction     COLECTOMY      Reversal of Colostomy 8/05     D & C       HC SACROPLASTY  2001    Hx of Spine Surgery L4-5     HYSTERECTOMY, DIOMEDES  5/2005        Family History   Problem Relation Age of Onset     Breast Cancer Mother      DIABETES Father      Hypertension Father      Lipids Father      Neurologic Disorder Daughter      Migraines     Neurologic Disorder Brother      CANCER No family hx of      no hx skin cancer       Social History     Social History     Marital status:      Spouse name: N/A     Number of children: N/A     Years of education: N/A     Occupational History     Not on file.     Social History Main Topics     Smoking status: Never  Smoker     Smokeless tobacco: Never Used     Alcohol use Yes      Comment: wine occasionally     Drug use: No     Sexual activity: Yes     Partners: Male     Other Topics Concern     Parent/Sibling W/ Cabg, Mi Or Angioplasty Before 65f 55m? No     Social History Narrative       Outpatient Encounter Prescriptions as of 5/17/2017   Medication Sig Dispense Refill     JANTOVEN 2.5 MG tablet TAKE 1/2 TABLET (2.5MG) DAILY ON MON, WED, AND FRI. TAKE 1 TABLET (5MG) DAILY ALL OTHER DAYS OR AS DIRECTED BY COUMADIN CLINIC 132 tablet 0     levothyroxine (SYNTHROID/LEVOTHROID) 75 MCG tablet Take 1 tablet (75 mcg) by mouth daily 90 tablet 3     simvastatin (ZOCOR) 40 MG tablet Take 1 tablet (40 mg) by mouth At Bedtime 90 tablet 3     acetaminophen-codeine (TYLENOL/CODEINE #3) 300-30 MG per tablet Take 1-2 tablets by mouth daily as needed for pain 30 tablet 0     furosemide (LASIX) 40 MG tablet Take 1 tablet (40 mg) by mouth daily as needed 90 tablet 3     metoprolol (LOPRESSOR) 50 MG tablet Take 1 tablet (50 mg) by mouth 2 times daily 180 tablet 3     ORDER FOR DME Equipment being ordered: insole Arch supports 1 Box 0     ORDER FOR DME Equipment being ordered: wrist splints for CTS 2 Units 0     ORDER FOR DME Equipment being ordered: specialized orthotics for feet 1 each 0     No facility-administered encounter medications on file as of 5/17/2017.              Review Of Systems  Skin: As above  Eyes: negative  Ears/Nose/Throat: negative  Respiratory: No shortness of breath, dyspnea on exertion, cough, or hemoptysis  Cardiovascular: negative  Gastrointestinal: negative  Genitourinary: negative  Musculoskeletal: negative  Neurologic: negative  Psychiatric: negative  Hematologic/Lymphatic/Immunologic: negative  Endocrine: negative      O:   NAD, WDWN, Alert & Oriented, Mood & Affect wnl, Vitals stable   Here today alone   /70  Pulse 85  SpO2 95%   General appearance normal   Vitals stable   Alert, oriented and in no acute  distress      Following lymph nodes palpated: Occipital, Cervical, Supraclavicular no lad   Stuck on papules and brown macules on trunk and ext    Red papules on trunk   L forehead 4mm pink pearly papule           The remainder of the full exam was unremarkable; the following areas were examined:  conjunctiva/lids, oral mucosa, neck, peripheral vascular system, abdomen, lymph nodes, digits/nails, eccrine and apocrine glands, scalp/hair, face, neck, chest, abdomen, buttocks, back, RUE, LUE, RLE, LLE       Eyes: Conjunctivae/lids:Normal     ENT: Lips, buccal mucosa, tongue: normal    MSK:Normal    Cardiovascular: peripheral edema none    Pulm: Breathing Normal    Lymph Nodes: No Head and Neck Lymphadenopathy     Neuro/Psych: Orientation:Normal; Mood/Affect:Normal      A/P:  1. Seborrheic keratosis, lentigo, angioma  2. Basal cell carcinoma forehead  BENIGN LESIONS DISCUSSED WITH PATIENT:  I discussed the specifics of tumor, prognosis, and genetics of benign lesions.  I explained that treatment of these lesions would be purely cosmetic and not medically neccessary.  I discussed with patient different removal options including excision, cautery and /or laser.      Nature and genetics of benign skin lesions dicussed with patient.  Signs and Symptoms of skin cancer discussed with patient.  Patient encouraged to perform monthly skin exams.  UV precautions reviewed with patient.  Skin care regimen reviewed with patient: Eliminate harsh soaps, i.e. Dial, zest, irsih spring; Mild soaps such as Cetaphil or Dove sensitive skin, avoid hot or cold showers, aggressive use of emollients including vanicream, cetaphil or cerave discussed with patient.    Risks of non-melanoma skin cancer discussed with patient   Return to clinic 6 months      PROCEDURE NOTE  L forehead basal cell carcinoma  MOHS:   Location    After PGACAC discussed with patient, decision for Mohs surgery was made. Indication for Mohs was Location. Patient confirmed  the site with Dr. Dove.  After anesthesia with LEC, the tumor was excised using standard Mohs technique in 1 stages(s).  CLEAR MARGINS OBTAINED and Final defect size was 1 cm.       REPAIR COMPLEX: Because of the tightness of the surrounding skin and Because of the size and full thickness nature of the defect, a complex closure was planned. After LEC anesthesia and prep, Burow's triangles were excised in the relaxed skin tension lines. The wound edges were widely undermined by dissection in the subcutaneous plane until adequate tissue mobility was obtained. Hemostasis was obtained. The wound edges were closed in a layered fashion using Vicryl and Fast Absorbing Plain Gut sutures. Postoperative length was 3.9 cm.   EBL minimal; complications none; wound care routine.  The patient was discharged in good condition and will return in one week for wound evaluation.

## 2017-05-17 NOTE — NURSING NOTE
"Initial /70  Pulse 85  SpO2 95% Estimated body mass index is 34.36 kg/(m^2) as calculated from the following:    Height as of 7/24/15: 1.727 m (5' 8\").    Weight as of 5/9/17: 102.5 kg (226 lb). .      "

## 2017-05-17 NOTE — PATIENT INSTRUCTIONS
Sutured Wound Care     [facility]: [phone]    [facility]: [phone]    forehead      ? No strenuous activity for 48 hours. Resume moderate activity in 48 hours. No heavy exercising until you are seen for follow up in one week.     ? Take Tylenol as needed for discomfort.                         ? Do not drink alcoholic beverages for 48 hours.     ? Keep the pressure bandage in place for 24 hours. If the bandage becomes blood tinged or loose, reinforce it with gauze and tape.        (Refer to the reverse side of this page for management of bleeding).    ? Remove pressure bandage in 24 hours     ? Leave the flat bandage in place until your follow up appointment.    ? Keep the bandage dry. Wash around it carefully.    ? If the tape becomes soiled or starts to come off, reinforce it with additional paper tape.    ? Do not smoke for 3 weeks; smoking is detrimental to wound healing.    ? It is normal to have swelling and bruising around the surgical site. The bruising will fade in approximately 10-14 days. Elevate the area to reduce swelling.    ? Numbness, itchiness and sensitivity to temperature changes can occur after surgery and may take up to 18 months to normalize.      POSSIBLE COMPLICATIONS    BLEEDIN. Leave the bandage in place.  2. Use tightly rolled up gauze or a cloth to apply direct pressure over the bandage for 20   minutes.  3. Reapply pressure for an additional 20 minutes if necessary  4. Call the office or go to the nearest emergency room if pressure fails to stop the bleeding.  5. Use additional gauze and tape to maintain pressure once the bleeding has stopped.        PAIN:    1. Post operative pain should slowly get better, never worse.  2. A severe increase in pain may indicate a problem. Call the office if this occurs.    In case of emergency phone:Dr Dove [phone]

## 2017-05-17 NOTE — MR AVS SNAPSHOT
After Visit Summary   2017    Radha Corona    MRN: [id number]           Patient Information     Date Of Birth          1950        Visit Information        Provider Department      2017 7:45 AM Edy Dove MD [facility]        Today's Diagnoses     Basal cell carcinoma, forehead    -  1    SK (seborrheic keratosis)        Lentigo        Angioma          Care Instructions      Sutured Wound Care     [facility]: [phone]    [facility]: [phone]    forehead      ? No strenuous activity for 48 hours. Resume moderate activity in 48 hours. No heavy exercising until you are seen for follow up in one week.     ? Take Tylenol as needed for discomfort.                         ? Do not drink alcoholic beverages for 48 hours.     ? Keep the pressure bandage in place for 24 hours. If the bandage becomes blood tinged or loose, reinforce it with gauze and tape.        (Refer to the reverse side of this page for management of bleeding).    ? Remove pressure bandage in 24 hours     ? Leave the flat bandage in place until your follow up appointment.    ? Keep the bandage dry. Wash around it carefully.    ? If the tape becomes soiled or starts to come off, reinforce it with additional paper tape.    ? Do not smoke for 3 weeks; smoking is detrimental to wound healing.    ? It is normal to have swelling and bruising around the surgical site. The bruising will fade in approximately 10-14 days. Elevate the area to reduce swelling.    ? Numbness, itchiness and sensitivity to temperature changes can occur after surgery and may take up to 18 months to normalize.      POSSIBLE COMPLICATIONS    BLEEDIN. Leave the bandage in place.  2. Use tightly rolled up gauze or a cloth to apply direct pressure over the bandage for 20   minutes.  3. Reapply pressure for an additional 20 minutes if necessary  4. Call the office or go to the nearest emergency room  "if pressure fails to stop the bleeding.  5. Use additional gauze and tape to maintain pressure once the bleeding has stopped.        PAIN:    1. Post operative pain should slowly get better, never worse.  2. A severe increase in pain may indicate a problem. Call the office if this occurs.    In case of emergency phone:Dr Dove [phone]          Follow-ups after your visit        Who to contact     If you have questions or need follow up information about today's clinic visit or your schedule please contact [facility] directly at [phone].  Normal or non-critical lab and imaging results will be communicated to you by [employer], letter or phone within 4 business days after the clinic has received the results. If you do not hear from us within 7 days, please contact the clinic through [employer] or phone. If you have a critical or abnormal lab result, we will notify you by phone as soon as possible.  Submit refill requests through [employer] or call your pharmacy and they will forward the refill request to us. Please allow 3 business days for your refill to be completed.          Additional Information About Your Visit        [employer] Information     [employer] lets you send messages to your doctor, view your test results, renew your prescriptions, schedule appointments and more. To sign up, go to [email or web address] . Click on \"Log in\" on the left side of the screen, which will take you to the Welcome page. Then click on \"Sign up Now\" on the right side of the page.     You will be asked to enter the access code listed below, as well as some personal information. Please follow the directions to create your username and password.     Your access code is: [email or web address]  Expires: 2017  7:54 AM     Your access code will  in 90 days. If you need help or a new code, please call your [facility] or [phone].        Care EveryWhere ID     This is your Care EveryWhere ID. This could be used by " other organizations to access your [place] medical records  [id number]        Your Vitals Were     Pulse Pulse Oximetry                85 95%           Blood Pressure from Last 3 Encounters:   05/17/17 124/70   05/09/17 113/67   05/09/17 136/86    Weight from Last 3 Encounters:   05/09/17 102.5 kg (226 lb)   12/13/16 100.5 kg (221 lb 9.6 oz)   10/24/16 101.2 kg (223 lb)              We Performed the Following     MOHS HEAD/NCK/HND/FT/GEN 1ST STAGE UP T0 5 BLOCKS     REPAIR COMPLEX, WOUND HEAD/AXIL/GEN/HND/FT 2.6-7.5 CM        Primary Care Provider Office Phone # Fax #    Myrna JANICE Galicia [facility] [phone]       [facility] [address] [address]        Thank you!     Thank you for choosing [facility]  for your care. Our goal is always to provide you with excellent care. Hearing back from our patients is one way we can continue to improve our services. Please take a few minutes to complete the written survey that you may receive in the mail after your visit with us. Thank you!             Your Updated Medication List - Protect others around you: Learn how to safely use, store and throw away your medicines at www.disposemymeds.org.          This list is accurate as of: 5/17/17  9:58 AM.  Always use your most recent med list.                   Brand Name Dispense Instructions for use    acetaminophen-codeine 300-30 MG per tablet    TYLENOL/codeine #3    30 tablet    Take 1-2 tablets by mouth daily as needed for pain       furosemide 40 MG tablet    LASIX    90 tablet    Take 1 tablet (40 mg) by mouth daily as needed       JANTOVEN 2.5 MG tablet   Generic drug:  warfarin     132 tablet    TAKE 1/2 TABLET (2.5MG) DAILY ON MON, WED, AND FRI. TAKE 1 TABLET (5MG) DAILY ALL OTHER DAYS OR AS DIRECTED BY COUMADIN CLINIC       levothyroxine 75 MCG tablet    SYNTHROID/LEVOTHROID    90 tablet    Take 1 tablet (75 mcg) by mouth daily       metoprolol 50 MG tablet    LOPRESSOR     180 tablet    Take 1 tablet (50 mg) by mouth 2 times daily       * order for DME     1 each    Equipment being ordered: specialized orthotics for feet       * order for DME     2 Units    Equipment being ordered: wrist splints for CTS       * order for DME     1 Box    Equipment being ordered: insole Arch supports       simvastatin 40 MG tablet    ZOCOR    90 tablet    Take 1 tablet (40 mg) by mouth At Bedtime       * Notice:  This list has 3 medication(s) that are the same as other medications prescribed for you. Read the directions carefully, and ask your doctor or other care provider to review them with you.

## 2017-05-24 ENCOUNTER — ALLIED HEALTH/NURSE VISIT (OUTPATIENT)
Dept: DERMATOLOGY | Facility: CLINIC | Age: 67
End: 2017-05-24
Payer: COMMERCIAL

## 2017-05-24 DIAGNOSIS — Z48.01 ENCOUNTER FOR CHANGE OR REMOVAL OF SURGICAL WOUND DRESSING: Primary | ICD-10-CM

## 2017-05-24 PROCEDURE — 99207 ZZC NO CHARGE NURSE ONLY: CPT

## 2017-05-24 NOTE — NURSING NOTE
Pt returned to clinic for post surgery 1 week follow up bandage change. Pt has no complaints, denies pain. Bandage removed from forehead, area cleansed with normal saline. Site is healing and wound edges approximating well. Reapplied new steri strips and paper tape.    Advised to watch for signs/sx of infection; spreading redness, drainage, odor, fever. Call or report promptly to clinic. Pt given written instructions and informed to rtc as needed. Patient verbalized understanding.     Gwendolyn Harrington LPN

## 2017-05-24 NOTE — PATIENT INSTRUCTIONS
WOUND CARE INSTRUCTIONS  for  ONE WEEK AFTER SURGERY  Forehead          1) Leave flat bandage on your skin for one week after today s bandage change.  2) In one week when you remove the bandage, you may resume your regular skin care routine, including washing with mild soap and water, applying moisturizer, make-up and sunscreen.    3) If there are any open or bleeding areas at the incision/graft site you should begin to cover the area with a bandage daily as follows:    1) Clean and dry the area with plain tap water using a Q-tip or sterile gauze pad.  2) Apply Polysporin or Bacitracin ointment to the open area.  3) Cover the wound with a band-aid or a sterile non-stick gauze pad and micropore paper tape.             *Once the bandages are removed, the scar will be red and firm (especially in the lip/chin area). This is normal and will fade in time. It might take 6-12 months for this to happen.     *Massaging the area will help the scar soften and fade quicker. Begin to massage the area one month after the bandages have been removed. To massage apply pressure directly and firmly over the scar with the fingertips and move in a circular motion. Massage the area for a few minutes several times a day. Continue to massage the site for several months.    *Approximately 6-8 weeks after surgery it is not uncommon to see the formation of  tender pimple-like  bump along the scar. This is normal. As the scar continues to mature and the stitches underneath the skin begin to dissolve, this might occur. Do not pick or squeeze, this will resolve on it s own. Should one break open producing a small amount of drainage, apply Polysporin or Bacitracin ointment a few times a day until the wound is completely healed.    *Numbness in the surgical area is expected. It might take 12-18 months for the feeling to return to normal. During this time sensations of itchiness, tingling and occasional sharp pains might be noted. These feelings are  normal and will subside once the nerves have completely healed.         IN CASE OF EMERGENCY: Dr Dove [phone]     If you were seen in [place] call: [phone]    If you were seen in [place] call: [phone]

## 2017-05-24 NOTE — MR AVS SNAPSHOT
After Visit Summary   5/24/2017    Radha Corona    MRN: [id number]           Patient Information     Date Of Birth          1950        Visit Information        Provider Department      5/24/2017 1:15 PM [facility]        Care Instructions    WOUND CARE INSTRUCTIONS  for  ONE WEEK AFTER SURGERY  Forehead          1) Leave flat bandage on your skin for one week after today s bandage change.  2) In one week when you remove the bandage, you may resume your regular skin care routine, including washing with mild soap and water, applying moisturizer, make-up and sunscreen.    3) If there are any open or bleeding areas at the incision/graft site you should begin to cover the area with a bandage daily as follows:    1) Clean and dry the area with plain tap water using a Q-tip or sterile gauze pad.  2) Apply Polysporin or Bacitracin ointment to the open area.  3) Cover the wound with a band-aid or a sterile non-stick gauze pad and micropore paper tape.             *Once the bandages are removed, the scar will be red and firm (especially in the lip/chin area). This is normal and will fade in time. It might take 6-12 months for this to happen.     *Massaging the area will help the scar soften and fade quicker. Begin to massage the area one month after the bandages have been removed. To massage apply pressure directly and firmly over the scar with the fingertips and move in a circular motion. Massage the area for a few minutes several times a day. Continue to massage the site for several months.    *Approximately 6-8 weeks after surgery it is not uncommon to see the formation of  tender pimple-like  bump along the scar. This is normal. As the scar continues to mature and the stitches underneath the skin begin to dissolve, this might occur. Do not pick or squeeze, this will resolve on it s own. Should one break open producing a small amount of drainage, apply Polysporin or  "Bacitracin ointment a few times a day until the wound is completely healed.    *Numbness in the surgical area is expected. It might take 12-18 months for the feeling to return to normal. During this time sensations of itchiness, tingling and occasional sharp pains might be noted. These feelings are normal and will subside once the nerves have completely healed.         IN CASE OF EMERGENCY: Dr Dove [phone]     If you were seen in [place] call: [phone]    If you were seen in [place] call: [phone]            Follow-ups after your visit        Your next 10 appointments already scheduled     May 24, 2017  1:15 PM CDT   Nurse Only with [facility] Nurse   [facility]    [address] [address] [phone]              Who to contact     If you have questions or need follow up information about today's clinic visit or your schedule please contact [facility] directly at [phone].  Normal or non-critical lab and imaging results will be communicated to you by [employer], letter or phone within 4 business days after the clinic has received the results. If you do not hear from us within 7 days, please contact the clinic through [employer] or phone. If you have a critical or abnormal lab result, we will notify you by phone as soon as possible.  Submit refill requests through [employer] or call your pharmacy and they will forward the refill request to us. Please allow 3 business days for your refill to be completed.          Additional Information About Your Visit        [employer] Information     [employer] lets you send messages to your doctor, view your test results, renew your prescriptions, schedule appointments and more. To sign up, go to [email or web address] . Click on \"Log in\" on the left side of the screen, which will take you to the Welcome page. Then click on \"Sign up Now\" on the right side of the page.     You will be asked to " enter the access code listed below, as well as some personal information. Please follow the directions to create your username and password.     Your access code is: [email or web address]  Expires: 2017  7:54 AM     Your access code will  in 90 days. If you need help or a new code, please call your [place] clinic or [phone].        Care EveryWhere ID     This is your Care EveryWhere ID. This could be used by other organizations to access your [place] medical records  [id number]         Blood Pressure from Last 3 Encounters:   17 124/70   17 113/67   17 136/86    Weight from Last 3 Encounters:   17 102.5 kg (226 lb)   16 100.5 kg (221 lb 9.6 oz)   10/24/16 101.2 kg (223 lb)              Today, you had the following     No orders found for display       Primary Care Provider Office Phone # Fax #    Myrna JANICE Galicia [facility] [phone]       [facility] [address] [address]        Thank you!     Thank you for choosing [facility]  for your care. Our goal is always to provide you with excellent care. Hearing back from our patients is one way we can continue to improve our services. Please take a few minutes to complete the written survey that you may receive in the mail after your visit with us. Thank you!             Your Updated Medication List - Protect others around you: Learn how to safely use, store and throw away your medicines at www.disposemymeds.org.          This list is accurate as of: 17  1:09 PM.  Always use your most recent med list.                   Brand Name Dispense Instructions for use    acetaminophen-codeine 300-30 MG per tablet    TYLENOL/codeine #3    30 tablet    Take 1-2 tablets by mouth daily as needed for pain       furosemide 40 MG tablet    LASIX    90 tablet    Take 1 tablet (40 mg) by mouth daily as needed       JANTOVEN 2.5 MG tablet   Generic drug:  warfarin     132 tablet    TAKE 1/2 TABLET  (2.5MG) DAILY ON MON, WED, AND FRI. TAKE 1 TABLET (5MG) DAILY ALL OTHER DAYS OR AS DIRECTED BY COUMADIN CLINIC       levothyroxine 75 MCG tablet    SYNTHROID/LEVOTHROID    90 tablet    Take 1 tablet (75 mcg) by mouth daily       metoprolol 50 MG tablet    LOPRESSOR    180 tablet    Take 1 tablet (50 mg) by mouth 2 times daily       * order for DME     1 each    Equipment being ordered: specialized orthotics for feet       * order for DME     2 Units    Equipment being ordered: wrist splints for CTS       * order for DME     1 Box    Equipment being ordered: insole Arch supports       simvastatin 40 MG tablet    ZOCOR    90 tablet    Take 1 tablet (40 mg) by mouth At Bedtime       * Notice:  This list has 3 medication(s) that are the same as other medications prescribed for you. Read the directions carefully, and ask your doctor or other care provider to review them with you.

## 2017-07-05 ENCOUNTER — ANTICOAGULATION THERAPY VISIT (OUTPATIENT)
Dept: ANTICOAGULATION | Facility: CLINIC | Age: 67
End: 2017-07-05
Payer: COMMERCIAL

## 2017-07-05 DIAGNOSIS — Z79.01 LONG TERM CURRENT USE OF ANTICOAGULANT THERAPY: ICD-10-CM

## 2017-07-05 LAB — INR POINT OF CARE: 2.9 (ref 0.86–1.14)

## 2017-07-05 PROCEDURE — 36416 COLLJ CAPILLARY BLOOD SPEC: CPT

## 2017-07-05 PROCEDURE — 85610 PROTHROMBIN TIME: CPT | Mod: QW

## 2017-07-05 PROCEDURE — 99207 ZZC NO CHARGE NURSE ONLY: CPT

## 2017-07-05 NOTE — MR AVS SNAPSHOT
Hawawilbert Corona   7/5/2017 11:15 AM   Anticoagulation Therapy Visit    Description:  67 year old female   Provider:  [facility]   Department:  [facility]           INR as of 7/5/2017     Today's INR 2.9      Anticoagulation Summary as of 7/5/2017     INR goal 2.0-3.0   Today's INR 2.9   Full instructions 2.5 mg on Mon, Wed, Fri; 5 mg all other days   Next INR check 9/20/2017    Indications   Cerebral infarction (H) [I63.9]  Atrial fibrillation [427.31] [I48.91]  Long term current use of anticoagulant therapy [Z79.01]         Description     Continue 2.5 mg on MWF, 5 mg rest of week. Recheck INR in 10 weeks.      July 2017 Details    Sun Mon Tue Wed Thu Fri Sat           1                 2               3               4               5      2.5 mg   See details      6      5 mg         7      2.5 mg         8      5 mg           9      5 mg         10      2.5 mg         11      5 mg         12      2.5 mg         13      5 mg         14      2.5 mg         15      5 mg           16      5 mg         17      2.5 mg         18      5 mg         19      2.5 mg         20      5 mg         21      2.5 mg         22      5 mg           23      5 mg         24      2.5 mg         25      5 mg         26      2.5 mg         27      5 mg         28      2.5 mg         29      5 mg           30      5 mg         31      2.5 mg               Date Details   07/05 This INR check               How to take your warfarin dose     To take:  2.5 mg Take 1 of the 2.5 mg tablets.    To take:  5 mg Take 2 of the 2.5 mg tablets.           August 2017 Details    Sun Mon Tue Wed Thu Fri Sat       1      5 mg         2      2.5 mg         3      5 mg         4      2.5 mg         5      5 mg           6      5 mg         7      2.5 mg         8      5 mg         9      2.5 mg         10      5 mg         11      2.5 mg         12      5 mg           13      5 mg         14      2.5 mg         15      5 mg         16      2.5 mg          17      5 mg         18      2.5 mg         19      5 mg           20      5 mg         21      2.5 mg         22      5 mg         23      2.5 mg         24      5 mg         25      2.5 mg         26      5 mg           27      5 mg         28      2.5 mg         29      5 mg         30      2.5 mg         31      5 mg            Date Details   No additional details            How to take your warfarin dose     To take:  2.5 mg Take 1 of the 2.5 mg tablets.    To take:  5 mg Take 2 of the 2.5 mg tablets.           September 2017 Details    Sun Mon Tue Wed Thu Fri Sat          1      2.5 mg         2      5 mg           3      5 mg         4      2.5 mg         5      5 mg         6      2.5 mg         7      5 mg         8      2.5 mg         9      5 mg           10      5 mg         11      2.5 mg         12      5 mg         13      2.5 mg         14      5 mg         15      2.5 mg         16      5 mg           17      5 mg         18      2.5 mg         19      5 mg         20            21               22               23                 24               25               26               27               28               29               30                Date Details   No additional details    Date of next INR:  9/20/2017         How to take your warfarin dose     To take:  2.5 mg Take 1 of the 2.5 mg tablets.    To take:  5 mg Take 2 of the 2.5 mg tablets.

## 2017-07-05 NOTE — PROGRESS NOTES
ANTICOAGULATION FOLLOW-UP CLINIC VISIT    Patient Name:  Radha Corona  Date:  7/5/2017  Contact Type:  Face to Face    SUBJECTIVE:     Patient Findings     Positives No Problem Findings           OBJECTIVE    INR Protime   Date Value Ref Range Status   07/05/2017 2.9 (A) 0.86 - 1.14 Final       ASSESSMENT / PLAN  INR assessment THER    Recheck INR In: 8 WEEKS    INR Location Clinic      Anticoagulation Summary as of 7/5/2017     INR goal 2.0-3.0   Today's INR 2.9   Maintenance plan 2.5 mg (2.5 mg x 1) on Mon, Wed, Fri; 5 mg (2.5 mg x 2) all other days   Full instructions 2.5 mg on Mon, Wed, Fri; 5 mg all other days   Weekly total 27.5 mg   No change documented Bouchra Corona [facility]   Plan last modified Melissa Mcmillan, RN (3/24/2015)   Next INR check 9/13/2017   Priority INR   Target end date Indefinite    Indications   Cerebral infarction (H) [I63.9]  Atrial fibrillation [427.31] [I48.91]  Long term current use of anticoagulant therapy [Z79.01]         Anticoagulation Episode Summary     INR check location     Preferred lab     Send INR reminders to [facility]OL    Comments * 2.5mg tablets. 12/15: snowbird letter given, will be in [place] for 3 months      Anticoagulation Care Providers     Provider Role Specialty Phone number    Myrna Staples APRN CNP Responsible Nurse Practitioner [phone]            See the Encounter Report to view Anticoagulation Flowsheet and Dosing Calendar (Go to Encounters tab in chart review, and find the Anticoagulation Therapy Visit)      Bouchra Corona RPH

## 2017-08-21 DIAGNOSIS — Z79.01 LONG TERM (CURRENT) USE OF ANTICOAGULANTS: ICD-10-CM

## 2017-08-21 DIAGNOSIS — I48.20 CHRONIC ATRIAL FIBRILLATION (H): ICD-10-CM

## 2017-08-21 NOTE — TELEPHONE ENCOUNTER
Teo    Last Written Prescription Date: 05/16/17  Last Fill Qty: 132, # refills: 0  Last Office Visit with G, P or [facility] prescribing provider: 05/09/17       Date and Result of Last PT/INR:   Lab Results   Component Value Date    INR 2.9 07/05/2017    INR 2.9 05/03/2017    INR 3.6 03/01/2017    INR 2.9 11/12/2009    INR 2.9 11/12/2009

## 2017-08-22 RX ORDER — WARFARIN SODIUM 2.5 MG/1
TABLET ORAL
Qty: 132 TABLET | Refills: 1 | Status: SHIPPED | OUTPATIENT
Start: 2017-08-22 | End: 2017-09-25

## 2017-09-12 ENCOUNTER — TRANSFERRED RECORDS (OUTPATIENT)
Dept: HEALTH INFORMATION MANAGEMENT | Facility: CLINIC | Age: 67
End: 2017-09-12

## 2017-09-25 ENCOUNTER — ANTICOAGULATION THERAPY VISIT (OUTPATIENT)
Dept: ANTICOAGULATION | Facility: CLINIC | Age: 67
End: 2017-09-25
Payer: COMMERCIAL

## 2017-09-25 DIAGNOSIS — Z79.01 LONG TERM (CURRENT) USE OF ANTICOAGULANTS: ICD-10-CM

## 2017-09-25 DIAGNOSIS — I63.9 CEREBRAL INFARCTION (H): ICD-10-CM

## 2017-09-25 DIAGNOSIS — Z79.01 LONG TERM CURRENT USE OF ANTICOAGULANT THERAPY: ICD-10-CM

## 2017-09-25 DIAGNOSIS — I48.20 CHRONIC ATRIAL FIBRILLATION (H): ICD-10-CM

## 2017-09-25 DIAGNOSIS — I48.91 ATRIAL FIBRILLATION (H): ICD-10-CM

## 2017-09-25 LAB — INR POINT OF CARE: 2.7 (ref 0.86–1.14)

## 2017-09-25 PROCEDURE — 99207 ZZC NO CHARGE NURSE ONLY: CPT

## 2017-09-25 PROCEDURE — 36416 COLLJ CAPILLARY BLOOD SPEC: CPT

## 2017-09-25 PROCEDURE — 85610 PROTHROMBIN TIME: CPT | Mod: QW

## 2017-09-25 RX ORDER — WARFARIN SODIUM 2.5 MG/1
TABLET ORAL
Qty: 132 TABLET | Refills: 1 | COMMUNITY
Start: 2017-09-25 | End: 2017-12-19

## 2017-09-25 NOTE — PROGRESS NOTES
ANTICOAGULATION FOLLOW-UP CLINIC VISIT    Patient Name:  Radha Corona  Date:  9/25/2017  Contact Type:  Face to Face    SUBJECTIVE:     Patient Findings     Positives No Problem Findings (no changes, concerns or problems reported)           OBJECTIVE    INR Protime   Date Value Ref Range Status   09/25/2017 2.7 (A) 0.86 - 1.14 Final       ASSESSMENT / PLAN  INR assessment THER    Recheck INR In: 8 WEEKS    INR Location Clinic      Anticoagulation Summary as of 9/25/2017     INR goal 2.0-3.0   Today's INR 2.7   Maintenance plan 2.5 mg (2.5 mg x 1) on Mon, Wed, Fri; 5 mg (2.5 mg x 2) all other days   Full instructions 2.5 mg on Mon, Wed, Fri; 5 mg all other days   Weekly total 27.5 mg   No change documented Zoë Muñoz RN   Plan last modified Melissa Mcmillan RN (3/24/2015)   Next INR check 11/20/2017   Priority INR   Target end date Indefinite    Indications   Cerebral infarction (H) [I63.9]  Atrial fibrillation [427.31] [I48.91]  Long term current use of anticoagulant therapy [Z79.01]         Anticoagulation Episode Summary     INR check location     Preferred lab     Send INR reminders to [facility]    Comments * 2.5mg tablets. 12/15: snowbird letter given, will be in [place] for 3 months      Anticoagulation Care Providers     Provider Role Specialty Phone number    JhoanMyrna, JANICE CNP Responsible Nurse Practitioner [phone]            See the Encounter Report to view Anticoagulation Flowsheet and Dosing Calendar (Go to Encounters tab in chart review, and find the Anticoagulation Therapy Visit)        Zoë Muñoz, RN

## 2017-09-25 NOTE — MR AVS SNAPSHOT
Radha Corona   9/25/2017 1:30 PM   Anticoagulation Therapy Visit    Description:  67 year old female   Provider:  [facility]   Department:  [facility]           INR as of 9/25/2017     Today's INR 2.7      Anticoagulation Summary as of 9/25/2017     INR goal 2.0-3.0   Today's INR 2.7   Full instructions 2.5 mg on Mon, Wed, Fri; 5 mg all other days   Next INR check 11/20/2017    Indications   Cerebral infarction (H) [I63.9]  Atrial fibrillation [427.31] [I48.91]  Long term current use of anticoagulant therapy [Z79.01]         Description     Recheck INR 8 weeks, 11/20/17  Continue warfarin 2.5 mg Mon,Wed, Fri, 5 mg rest of week      September 2017 Details    Sun Mon Tue Wed Thu Fri Sat          1               2                 3               4               5               6               7               8               9                 10               11               12               13               14               15               16                 17               18               19               20               21               22               23                 24               25      2.5 mg   See details      26      5 mg         27      2.5 mg         28      5 mg         29      2.5 mg         30      5 mg          Date Details   09/25 This INR check               How to take your warfarin dose     To take:  2.5 mg Take 1 of the 2.5 mg tablets.    To take:  5 mg Take 2 of the 2.5 mg tablets.           October 2017 Details    Sun Mon Tue Wed Thu Fri Sat     1      5 mg         2      2.5 mg         3      5 mg         4      2.5 mg         5      5 mg         6      2.5 mg         7      5 mg           8      5 mg         9      2.5 mg         10      5 mg         11      2.5 mg         12      5 mg         13      2.5 mg         14      5 mg           15      5 mg         16      2.5 mg         17      5 mg         18      2.5 mg         19      5 mg         20      2.5 mg         21       5 mg           22      5 mg         23      2.5 mg         24      5 mg         25      2.5 mg         26      5 mg         27      2.5 mg         28      5 mg           29      5 mg         30      2.5 mg         31      5 mg              Date Details   No additional details            How to take your warfarin dose     To take:  2.5 mg Take 1 of the 2.5 mg tablets.    To take:  5 mg Take 2 of the 2.5 mg tablets.           November 2017 Details    Sun Mon Tue Wed Thu Fri Sat        1      2.5 mg         2      5 mg         3      2.5 mg         4      5 mg           5      5 mg         6      2.5 mg         7      5 mg         8      2.5 mg         9      5 mg         10      2.5 mg         11      5 mg           12      5 mg         13      2.5 mg         14      5 mg         15      2.5 mg         16      5 mg         17      2.5 mg         18      5 mg           19      5 mg         20            21               22               23               24               25                 26               27               28               29               30                  Date Details   No additional details    Date of next INR:  11/20/2017         How to take your warfarin dose     To take:  2.5 mg Take 1 of the 2.5 mg tablets.    To take:  5 mg Take 2 of the 2.5 mg tablets.

## 2017-10-11 ENCOUNTER — TELEPHONE (OUTPATIENT)
Dept: FAMILY MEDICINE | Facility: CLINIC | Age: 67
End: 2017-10-11

## 2017-10-11 NOTE — TELEPHONE ENCOUNTER
10/11/2017    Call Regarding Preventive Health Screening FIT    Attempt 1    Message     Comments: Will call on own time for FIT          Outreach   AT

## 2017-11-20 ENCOUNTER — ANTICOAGULATION THERAPY VISIT (OUTPATIENT)
Dept: ANTICOAGULATION | Facility: CLINIC | Age: 67
End: 2017-11-20
Payer: COMMERCIAL

## 2017-11-20 ENCOUNTER — OFFICE VISIT (OUTPATIENT)
Dept: DERMATOLOGY | Facility: CLINIC | Age: 67
End: 2017-11-20
Payer: COMMERCIAL

## 2017-11-20 VITALS — DIASTOLIC BLOOD PRESSURE: 76 MMHG | HEART RATE: 66 BPM | SYSTOLIC BLOOD PRESSURE: 114 MMHG | TEMPERATURE: 97.3 F

## 2017-11-20 DIAGNOSIS — I48.91 ATRIAL FIBRILLATION (H): ICD-10-CM

## 2017-11-20 DIAGNOSIS — Z11.59 NEED FOR HEPATITIS C SCREENING TEST: ICD-10-CM

## 2017-11-20 DIAGNOSIS — I63.9 CEREBRAL INFARCTION (H): ICD-10-CM

## 2017-11-20 DIAGNOSIS — D18.00 ANGIOMA: ICD-10-CM

## 2017-11-20 DIAGNOSIS — Z85.828 HISTORY OF SKIN CANCER: ICD-10-CM

## 2017-11-20 DIAGNOSIS — L82.1 SK (SEBORRHEIC KERATOSIS): ICD-10-CM

## 2017-11-20 DIAGNOSIS — L82.0 INFLAMED SEBORRHEIC KERATOSIS: Primary | ICD-10-CM

## 2017-11-20 DIAGNOSIS — L81.4 LENTIGO: ICD-10-CM

## 2017-11-20 DIAGNOSIS — Z79.01 LONG TERM CURRENT USE OF ANTICOAGULANT THERAPY: ICD-10-CM

## 2017-11-20 LAB — INR POINT OF CARE: 2.2 (ref 0.86–1.14)

## 2017-11-20 PROCEDURE — 99207 ZZC NO CHARGE NURSE ONLY: CPT

## 2017-11-20 PROCEDURE — 86803 HEPATITIS C AB TEST: CPT | Performed by: NURSE PRACTITIONER

## 2017-11-20 PROCEDURE — 85610 PROTHROMBIN TIME: CPT | Mod: QW

## 2017-11-20 PROCEDURE — 36416 COLLJ CAPILLARY BLOOD SPEC: CPT

## 2017-11-20 PROCEDURE — 17110 DESTRUCTION B9 LES UP TO 14: CPT | Performed by: DERMATOLOGY

## 2017-11-20 PROCEDURE — 99213 OFFICE O/P EST LOW 20 MIN: CPT | Mod: 25 | Performed by: DERMATOLOGY

## 2017-11-20 NOTE — MR AVS SNAPSHOT
After Visit Summary   11/20/2017    Radha Corona    MRN: [id number]           Patient Information     Date Of Birth          1950        Visit Information        Provider Department      11/20/2017 9:15 AM Edy Dove MD [facility]        Care Instructions    WOUND CARE INSTRUCTIONS   FOR CRYOSURGERY   This area treated with liquid nitrogen will form a blister. You do not need to bandage the area until after the blister forms and breaks (which may be a few days). When the blister breaks, begin daily dressing changes as follows:   1) Clean and dry the area with tap water using clean Q-tip or sterile gauze pad.   2) Apply Polysporin ointment or Bacitracin ointment over entire wound. Do NOT use Neosporin ointment.   3) Cover the wound with a band-aid or sterile non-stick gauze pad and micropore paper tape.   REPEAT THESE INSTRUCTIONS AT LEAST ONCE A DAY UNTIL THE WOUND HAS COMPLETELY HEALED.   It is an old wives tale that a wound heals better when it is exposed to air and allowed to dry out. The wound will heal faster with a better cosmetic result if it is kept moist with ointment and covered with a bandage.   Do not let the wound dry out.   IMPORTANT INFORMATION ON REVERSE SIDE   Supplies Needed:   *Cotton tipped applicators (Q-tips)   *Polysporin ointment or Bacitracin ointment (NOT NEOSPORIN)   *Band-aids, or non stick gauze pads and micropore paper tape   PATIENT INFORMATION   During the healing process you will notice a number of changes. All wounds develop a small halo of redness surrounding the wound. This means healing is occurring. Severe itching with extensive redness usually indicates sensitivity to the ointment or bandage tape used to dress the wound. You should call our office if this develops.   Swelling and/or discoloration around your surgical site is common, particularly when performed around the eye.   All wounds normally drain. The larger the wound  the more drainage there will be. After 7-10 days, you will notice the wound beginning to shrink and new skin will begin to grow. The wound is healed when you can see skin has formed over the entire area. A healed wound has a healthy, shiny look to the surface and is red to dark pink in color to normalize. Wounds may take approximately 4-6 weeks to heal. Larger wounds may take 6-8 weeks. After the wound is healed you may discontinue dressing changes.   You may experience a sensation of tightness as your wound heals. This is normal and will gradually subside.   Your healed wound may be sensitive to temperature changes. This sensitivity improves with time, but if you re having a lot of discomfort, try to avoid temperature extremes.   Patients frequently experience itching after their wound appears to have healed because of the continue healing under the skin. Plain Vaseline will help relieve the itching.               Follow-ups after your visit        Your next 10 appointments already scheduled     Dec 22, 2017  9:45 AM CST   Anticoagulation Visit with [facility]   [facility]    [address] [address] [phone]              Who to contact     If you have questions or need follow up information about today's clinic visit or your schedule please contact [facility] directly at [phone].  Normal or non-critical lab and imaging results will be communicated to you by MyChart, letter or phone within 4 business days after the clinic has received the results. If you do not hear from us within 7 days, please contact the clinic through MyChart or phone. If you have a critical or abnormal lab result, we will notify you by phone as soon as possible.  Submit refill requests through [employer] or call your pharmacy and they will forward the refill request to us. Please allow 3 business days for your refill to be completed.          Additional  "Information About Your Visit        MyChart Information     [employer] lets you send messages to your doctor, view your test results, renew your prescriptions, schedule appointments and more. To sign up, go to [email or web address] . Click on \"Log in\" on the left side of the screen, which will take you to the Welcome page. Then click on \"Sign up Now\" on the right side of the page.     You will be asked to enter the access code listed below, as well as some personal information. Please follow the directions to create your username and password.     Your access code is: [email or web address]  Expires: 2018  9:33 AM     Your access code will  in 90 days. If you need help or a new code, please call your [facility] clinic or [phone].        Care EveryWhere ID     This is your Care EveryWhere ID. This could be used by other organizations to access your [facility] medical records  [id number]        Your Vitals Were     Pulse Temperature                66 97.3  F (36.3  C)           Blood Pressure from Last 3 Encounters:   17 114/76   17 124/70   17 113/67    Weight from Last 3 Encounters:   17 102.5 kg (226 lb)   16 100.5 kg (221 lb 9.6 oz)   10/24/16 101.2 kg (223 lb)              Today, you had the following     No orders found for display       Primary Care Provider Office Phone # Fax #    Myrnaakila Staples, APRN [facility] [phone] [address] [address]        Equal Access to Services     [facility] : Hadii cole fonseca hadasho Sonena, waaxda luqadaha, qaybta kaalmada yaritza mcmanus. So [facility] [phone].    ATENCIÓN: Si habla español, tiene a howard disposición servicios gratuitos de asistencia lingüística. Llame al [phone].    We comply with applicable federal civil rights laws and [place] laws. We do not discriminate on the basis of race, color, national origin, age, disability, sex, sexual orientation, or gender " identity.            Thank you!     Thank you for choosing [facility]  for your care. Our goal is always to provide you with excellent care. Hearing back from our patients is one way we can continue to improve our services. Please take a few minutes to complete the written survey that you may receive in the mail after your visit with us. Thank you!             Your Updated Medication List - Protect others around you: Learn how to safely use, store and throw away your medicines at www.disposemymeds.org.          This list is accurate as of: 11/20/17  9:33 AM.  Always use your most recent med list.                   Brand Name Dispense Instructions for use Diagnosis    acetaminophen-codeine 300-30 MG per tablet    TYLENOL WITH CODEINE #3    30 tablet    Take 1-2 tablets by mouth daily as needed for pain    Migraine without status migrainosus, not intractable, unspecified migraine type       furosemide 40 MG tablet    LASIX    90 tablet    Take 1 tablet (40 mg) by mouth daily as needed    Localized edema       JANTOVEN 2.5 MG tablet   Generic drug:  warfarin     132 tablet    As directed by Anticoagulation Clinic, current dose 2.5 mg Mon, Wed, Fri, 5 mg rest of week    Chronic atrial fibrillation (H), Long term (current) use of anticoagulants       levothyroxine 75 MCG tablet    SYNTHROID/LEVOTHROID    90 tablet    Take 1 tablet (75 mcg) by mouth daily    Hypothyroidism, unspecified type       metoprolol 50 MG tablet    LOPRESSOR    180 tablet    Take 1 tablet (50 mg) by mouth 2 times daily    Paroxysmal atrial fibrillation (H)       * order for DME     1 each    Equipment being ordered: specialized orthotics for feet    Foot pain       * order for DME     2 Units    Equipment being ordered: wrist splints for CTS    CTS (carpal tunnel syndrome), unspecified laterality       * order for DME     1 Box    Equipment being ordered: insole Arch supports    Foot drop, right       simvastatin 40 MG tablet     ZOCOR    90 tablet    Take 1 tablet (40 mg) by mouth At Bedtime    Hyperlipidemia LDL goal <100       * Notice:  This list has 3 medication(s) that are the same as other medications prescribed for you. Read the directions carefully, and ask your doctor or other care provider to review them with you.

## 2017-11-20 NOTE — PROGRESS NOTES
Radha Corona is a 67 year old year old female patient here today for f/u hx of non-melanoma skin cancer.  Today she notes itching spot on right back.   .  Patient states this has been present for months.  Patient reports the following symptoms:  itching.  Patient reports the following previous treatments none.  Patient reports the following modifying factors none.  Associated symptoms: none.  Patient has no other skin complaints today.  Remainder of the HPI, Meds, PMH, Allergies, FH, and SH was reviewed in chart.      Past Medical History:   Diagnosis Date     Abscess of intestine 8/3/07    ptl colectomy     Basal cell carcinoma      Displacement of lumbar intervertebral disc without myelopathy 8/3/07     GERD (gastroesophageal reflux disease)      Malignant neoplasm of breast (female), unspecified site 8/3/07     Migraine, unspecified, without mention of intractable migraine without mention of status migrainosus      Other and unspecified hyperlipidemia 8/3/07       Past Surgical History:   Procedure Laterality Date     ARTHROSCOPY KNEE RT/LT  4/2005     BREAST LUMPECTOMY, RT/LT  2/7/06    Right Breast Lumpectomy     C ORAL SURGERY PROCEDURE      wisdom teeth extraction     COLECTOMY      Reversal of Colostomy 8/05     D & C       HC SACROPLASTY  2001    Hx of Spine Surgery L4-5     HYSTERECTOMY, DIOMEDES  5/2005        Family History   Problem Relation Age of Onset     Breast Cancer Mother      DIABETES Father      Hypertension Father      Lipids Father      Neurologic Disorder Daughter      Migraines     Neurologic Disorder Brother      CANCER No family hx of      no hx skin cancer       Social History     Social History     Marital status:      Spouse name: N/A     Number of children: N/A     Years of education: N/A     Occupational History     Not on file.     Social History Main Topics     Smoking status: Never Smoker     Smokeless tobacco: Never Used     Alcohol use Yes      Comment: wine occasionally      Drug use: No     Sexual activity: Yes     Partners: Male     Other Topics Concern     Parent/Sibling W/ Cabg, Mi Or Angioplasty Before 65f 55m? No     Social History Narrative       Outpatient Encounter Prescriptions as of 11/20/2017   Medication Sig Dispense Refill     warfarin (JANTOVEN) 2.5 MG tablet As directed by Anticoagulation Clinic, current dose 2.5 mg Mon, Wed, Fri, 5 mg rest of week 132 tablet 1     levothyroxine (SYNTHROID/LEVOTHROID) 75 MCG tablet Take 1 tablet (75 mcg) by mouth daily 90 tablet 3     simvastatin (ZOCOR) 40 MG tablet Take 1 tablet (40 mg) by mouth At Bedtime 90 tablet 3     acetaminophen-codeine (TYLENOL/CODEINE #3) 300-30 MG per tablet Take 1-2 tablets by mouth daily as needed for pain 30 tablet 0     furosemide (LASIX) 40 MG tablet Take 1 tablet (40 mg) by mouth daily as needed 90 tablet 3     metoprolol (LOPRESSOR) 50 MG tablet Take 1 tablet (50 mg) by mouth 2 times daily 180 tablet 3     ORDER FOR DME Equipment being ordered: insole Arch supports 1 Box 0     ORDER FOR DME Equipment being ordered: wrist splints for CTS 2 Units 0     ORDER FOR DME Equipment being ordered: specialized orthotics for feet 1 each 0     No facility-administered encounter medications on file as of 11/20/2017.              Review Of Systems  Skin: As above  Eyes: negative  Ears/Nose/Throat: negative  Respiratory: No shortness of breath, dyspnea on exertion, cough, or hemoptysis  Cardiovascular: negative  Gastrointestinal: negative  Genitourinary: negative  Musculoskeletal: negative  Neurologic: negative  Psychiatric: negative  Hematologic/Lymphatic/Immunologic: negative  Endocrine: negative      O:   NAD, WDWN, Alert & Oriented, Mood & Affect wnl, Vitals stable   Here today alone   /76 (BP Location: Left arm, Cuff Size: Adult Regular)  Pulse 66  Temp 97.3  F (36.3  C)   General appearance normal   Vitals stable   Alert, oriented and in no acute distress      Following lymph nodes palpated:  Occipital, Cervical, Supraclavicular no lad   Stuck on papules and brown macules on trunk and ext    Red papules on trunk   r back inflamed seborrheic keratosis         The remainder of the full exam was unremarkable; the following areas were examined:  conjunctiva/lids, oral mucosa, neck, peripheral vascular system, abdomen, lymph nodes, digits/nails, eccrine and apocrine glands, scalp/hair, face, neck, chest, abdomen, buttocks, back, RUE, LUE, RLE, LLE       Eyes: Conjunctivae/lids:Normal     ENT: Lips, buccal mucosa, tongue: normal    MSK:Normal    Cardiovascular: peripheral edema none    Pulm: Breathing Normal    Lymph Nodes: No Head and Neck Lymphadenopathy     Neuro/Psych: Orientation:Normal; Mood/Affect:Normal      A/P:  1. R back inflamed seborrheic keratosis   LN2:  Treated with LN2 for 5s for 1-2 cycles. Warned risks of blistering, pain, pigment change, scarring, and incomplete resolution.  Advised patient to return if lesions do not completely resolve.  Wound care sheet given.  2. Seborrheic keratosis, lentigo, angioma, hx of non-melanoma skin cancer   BENIGN LESIONS DISCUSSED WITH PATIENT:  I discussed the specifics of tumor, prognosis, and genetics of benign lesions.  I explained that treatment of these lesions would be purely cosmetic and not medically neccessary.  I discussed with patient different removal options including excision, cautery and /or laser.      Nature and genetics of benign skin lesions dicussed with patient.  Signs and Symptoms of skin cancer discussed with patient.  ABCDEs of melanoma reviewed with patient.  Patient encouraged to perform monthly skin exams.  UV precautions reviewed with patient.  Skin care regimen reviewed with patient: Eliminate harsh soaps, i.e. Dial, zest, irsih spring; Mild soaps such as Cetaphil or Dove sensitive skin, avoid hot or cold showers, aggressive use of emollients including vanicream, cetaphil or cerave discussed with patient.    Risks of non-melanoma  skin cancer discussed with patient   Return to clinic 12 months

## 2017-11-20 NOTE — NURSING NOTE
"Chief Complaint   Patient presents with     Skin Check       Initial /76 (BP Location: Left arm, Cuff Size: Adult Regular)  Pulse 66  Temp 97.3  F (36.3  C) Estimated body mass index is 34.36 kg/(m^2) as calculated from the following:    Height as of 7/24/15: 1.727 m (5' 8\").    Weight as of 5/9/17: 102.5 kg (226 lb).  Medication Reconciliation: complete  "

## 2017-11-20 NOTE — PROGRESS NOTES
ANTICOAGULATION FOLLOW-UP CLINIC VISIT    Patient Name:  Radha Corona  Date:  11/20/2017  Contact Type:  Face to Face    SUBJECTIVE:     Patient Findings     Positives No Problem Findings    Comments Patient will be leaving for [place] the day after Christmas. She will get a snowbird letter at her next [facility] visit on 12/22. She stated she plans on going for the 3 months and waiting to have her INR checked until she gets home. She has been very stable with her INR and has been on the same warfarin dose for about 3 years. She will take the letter just in case she needs to have an INR drawn.            OBJECTIVE    INR Protime   Date Value Ref Range Status   11/20/2017 2.2 (A) 0.86 - 1.14 Final       ASSESSMENT / PLAN  INR assessment THER    Recheck INR In: 4 WEEKS before leaving for [place]   INR Location Clinic      Anticoagulation Summary as of 11/20/2017     INR goal 2.0-3.0   Today's INR 2.2   Maintenance plan 2.5 mg (2.5 mg x 1) on Mon, Wed, Fri; 5 mg (2.5 mg x 2) all other days   Full instructions 2.5 mg on Mon, Wed, Fri; 5 mg all other days   Weekly total 27.5 mg   No change documented Yeni Payan RN   Plan last modified Melissa Mcmillan, RN (3/24/2015)   Next INR check 12/22/2017   Priority INR   Target end date Indefinite    Indications   Cerebral infarction (H) [I63.9]  Atrial fibrillation [427.31] [I48.91]  Long term current use of anticoagulant therapy [Z79.01]         Anticoagulation Episode Summary     INR check location     Preferred lab     Send INR reminders to [facility]    Comments * 2.5mg tablets. 12/22 - needs snowbird letter      Anticoagulation Care Providers     Provider Role Specialty Phone number    JhoanMyrna mead, APRN CNP Responsible Nurse Practitioner [phone]            See the Encounter Report to view Anticoagulation Flowsheet and Dosing Calendar (Go to Encounters tab in chart review, and find the Anticoagulation Therapy Visit)        Yeni Payan,  RN

## 2017-11-20 NOTE — MR AVS SNAPSHOT
Radha Corona   11/20/2017 8:45 AM   Anticoagulation Therapy Visit    Description:  67 year old female   Provider:  [facility]   Department:  [facility]           INR as of 11/20/2017     Today's INR 2.2      Anticoagulation Summary as of 11/20/2017     INR goal 2.0-3.0   Today's INR 2.2   Full instructions 2.5 mg on Mon, Wed, Fri; 5 mg all other days   Next INR check 12/22/2017    Indications   Cerebral infarction (H) [I63.9]  Atrial fibrillation [427.31] [I48.91]  Long term current use of anticoagulant therapy [Z79.01]         Your next Anticoagulation Clinic appointment(s)     Nov 20, 2017  8:45 AM CST   [facility]agulation Visit with [facility]   [facility]    [address] [address]   [phone]            Dec 22, 2017  9:45 AM CST   Anticoagulation Visit with [facility]   [facility]    [address] [address]   [phone]              Contact Numbers     Please call [phone] to cancel and/or reschedule your appointment.  Please call [phone] with any problems or questions regarding your therapy          November 2017 Details    Sun Mon Tue Wed Thu Fri Sat        1               2               3               4                 5               6               7               8               9               10               11                 12               13               14               15               16               17               18                 19               20      2.5 mg   See details      21      5 mg         22      2.5 mg         23      5 mg         24      2.5 mg         25      5 mg           26      5 mg         27      2.5 mg         28      5 mg         29      2.5 mg         30      5 mg            Date Details   11/20 This INR check               How to take your warfarin dose     To take:  2.5 mg Take 1 of the 2.5 mg tablets.     To take:  5 mg Take 2 of the 2.5 mg tablets.           December 2017 Details    Sun Mon Tue Wed Thu Fri Sat          1      2.5 mg         2      5 mg           3      5 mg         4      2.5 mg         5      5 mg         6      2.5 mg         7      5 mg         8      2.5 mg         9      5 mg           10      5 mg         11      2.5 mg         12      5 mg         13      2.5 mg         14      5 mg         15      2.5 mg         16      5 mg           17      5 mg         18      2.5 mg         19      5 mg         20      2.5 mg         21      5 mg         22            23                 24               25               26               27               28               29               30                 31                      Date Details   No additional details    Date of next INR:  12/22/2017         How to take your warfarin dose     To take:  2.5 mg Take 1 of the 2.5 mg tablets.    To take:  5 mg Take 2 of the 2.5 mg tablets.

## 2017-11-20 NOTE — LETTER
11/20/2017         RE: Radha Corona  [address] [place] [id number]        Dear Colleague,    Thank you for referring your patient, Radha Corona, to the [facility]. Please see a copy of my visit note below.    Radha Corona is a 67 year old year old female patient here today for f/u hx of non-melanoma skin cancer.  Today she notes itching spot on right back.   .  Patient states this has been present for months.  Patient reports the following symptoms:  itching.  Patient reports the following previous treatments none.  Patient reports the following modifying factors none.  Associated symptoms: none.  Patient has no other skin complaints today.  Remainder of the HPI, Meds, PMH, Allergies, FH, and SH was reviewed in chart.      Past Medical History:   Diagnosis Date     Abscess of intestine 8/3/07    ptl colectomy     Basal cell carcinoma      Displacement of lumbar intervertebral disc without myelopathy 8/3/07     GERD (gastroesophageal reflux disease)      Malignant neoplasm of breast (female), unspecified site 8/3/07     Migraine, unspecified, without mention of intractable migraine without mention of status migrainosus      Other and unspecified hyperlipidemia 8/3/07       Past Surgical History:   Procedure Laterality Date     ARTHROSCOPY KNEE RT/LT  4/2005     BREAST LUMPECTOMY, RT/LT  2/7/06    Right Breast Lumpectomy     C ORAL SURGERY PROCEDURE      wisdom teeth extraction     COLECTOMY      Reversal of Colostomy 8/05     D & C       HC SACROPLASTY  2001    Hx of Spine Surgery L4-5     HYSTERECTOMY, DIOMEDES  5/2005        Family History   Problem Relation Age of Onset     Breast Cancer Mother      DIABETES Father      Hypertension Father      Lipids Father      Neurologic Disorder Daughter      Migraines     Neurologic Disorder Brother      CANCER No family hx of      no hx skin cancer       Social History     Social History     Marital status:      Spouse  name: N/A     Number of children: N/A     Years of education: N/A     Occupational History     Not on file.     Social History Main Topics     Smoking status: Never Smoker     Smokeless tobacco: Never Used     Alcohol use Yes      Comment: wine occasionally     Drug use: No     Sexual activity: Yes     Partners: Male     Other Topics Concern     Parent/Sibling W/ Cabg, Mi Or Angioplasty Before 65f 55m? No     Social History Narrative       Outpatient Encounter Prescriptions as of 11/20/2017   Medication Sig Dispense Refill     warfarin (JANTOVEN) 2.5 MG tablet As directed by Anticoagulation Clinic, current dose 2.5 mg Mon, Wed, Fri, 5 mg rest of week 132 tablet 1     levothyroxine (SYNTHROID/LEVOTHROID) 75 MCG tablet Take 1 tablet (75 mcg) by mouth daily 90 tablet 3     simvastatin (ZOCOR) 40 MG tablet Take 1 tablet (40 mg) by mouth At Bedtime 90 tablet 3     acetaminophen-codeine (TYLENOL/CODEINE #3) 300-30 MG per tablet Take 1-2 tablets by mouth daily as needed for pain 30 tablet 0     furosemide (LASIX) 40 MG tablet Take 1 tablet (40 mg) by mouth daily as needed 90 tablet 3     metoprolol (LOPRESSOR) 50 MG tablet Take 1 tablet (50 mg) by mouth 2 times daily 180 tablet 3     ORDER FOR DME Equipment being ordered: insole Arch supports 1 Box 0     ORDER FOR DME Equipment being ordered: wrist splints for CTS 2 Units 0     ORDER FOR DME Equipment being ordered: specialized orthotics for feet 1 each 0     No facility-administered encounter medications on file as of 11/20/2017.              Review Of Systems  Skin: As above  Eyes: negative  Ears/Nose/Throat: negative  Respiratory: No shortness of breath, dyspnea on exertion, cough, or hemoptysis  Cardiovascular: negative  Gastrointestinal: negative  Genitourinary: negative  Musculoskeletal: negative  Neurologic: negative  Psychiatric: negative  Hematologic/Lymphatic/Immunologic: negative  Endocrine: negative      O:   NAD, WDWN, Alert & Oriented, Mood & Affect wnl,  Vitals stable   Here today alone   /76 (BP Location: Left arm, Cuff Size: Adult Regular)  Pulse 66  Temp 97.3  F (36.3  C)   General appearance normal   Vitals stable   Alert, oriented and in no acute distress      Following lymph nodes palpated: Occipital, Cervical, Supraclavicular no lad   Stuck on papules and brown macules on trunk and ext    Red papules on trunk   r back inflamed seborrheic keratosis         The remainder of the full exam was unremarkable; the following areas were examined:  conjunctiva/lids, oral mucosa, neck, peripheral vascular system, abdomen, lymph nodes, digits/nails, eccrine and apocrine glands, scalp/hair, face, neck, chest, abdomen, buttocks, back, RUE, LUE, RLE, LLE       Eyes: Conjunctivae/lids:Normal     ENT: Lips, buccal mucosa, tongue: normal    MSK:Normal    Cardiovascular: peripheral edema none    Pulm: Breathing Normal    Lymph Nodes: No Head and Neck Lymphadenopathy     Neuro/Psych: Orientation:Normal; Mood/Affect:Normal      A/P:  1. R back inflamed seborrheic keratosis   LN2:  Treated with LN2 for 5s for 1-2 cycles. Warned risks of blistering, pain, pigment change, scarring, and incomplete resolution.  Advised patient to return if lesions do not completely resolve.  Wound care sheet given.  2. Seborrheic keratosis, lentigo, angioma, hx of non-melanoma skin cancer   BENIGN LESIONS DISCUSSED WITH PATIENT:  I discussed the specifics of tumor, prognosis, and genetics of benign lesions.  I explained that treatment of these lesions would be purely cosmetic and not medically neccessary.  I discussed with patient different removal options including excision, cautery and /or laser.      Nature and genetics of benign skin lesions dicussed with patient.  Signs and Symptoms of skin cancer discussed with patient.  ABCDEs of melanoma reviewed with patient.  Patient encouraged to perform monthly skin exams.  UV precautions reviewed with patient.  Skin care regimen reviewed with  patient: Eliminate harsh soaps, i.e. Dial, zest, irsih spring; Mild soaps such as Cetaphil or Dove sensitive skin, avoid hot or cold showers, aggressive use of emollients including vanicream, cetaphil or cerave discussed with patient.    Risks of non-melanoma skin cancer discussed with patient   Return to clinic 12 months        Again, thank you for allowing me to participate in the care of your patient.        Sincerely,        Edy Dove MD

## 2017-11-21 LAB — HCV AB SERPL QL IA: NONREACTIVE

## 2017-11-23 DIAGNOSIS — E78.5 HYPERLIPIDEMIA LDL GOAL <100: ICD-10-CM

## 2017-11-23 DIAGNOSIS — E03.9 HYPOTHYROIDISM, UNSPECIFIED TYPE: ICD-10-CM

## 2017-11-28 RX ORDER — SIMVASTATIN 40 MG
TABLET ORAL
Qty: 30 TABLET | Refills: 0 | Status: SHIPPED | OUTPATIENT
Start: 2017-11-28 | End: 2017-12-19

## 2017-11-28 RX ORDER — LEVOTHYROXINE SODIUM 75 UG/1
TABLET ORAL
Qty: 30 TABLET | Refills: 0 | Status: SHIPPED | OUTPATIENT
Start: 2017-11-28 | End: 2017-12-07

## 2017-11-28 NOTE — TELEPHONE ENCOUNTER
Requested Prescriptions   Pending Prescriptions Disp Refills     levothyroxine (SYNTHROID/LEVOTHROID) 75 MCG tablet [Pharmacy Med Name: LEVOTHYROXINE 75MCG TAB] 90 tablet      Sig: TAKE 1 TABLET BY MOUTH DAILY    Thyroid Protocol Failed    11/23/2017  1:02 AM       Failed - Normal TSH on file in past 12 months    Recent Labs   Lab Test  02/26/16   1007   TSH  3.02             Passed - Patient is 12 years or older       Passed - Recent or future visit with authorizing provider's specialty    Patient had office visit in the last year or has a visit in the next 30 days with authorizing provider.  See chart review.              Passed - No active pregnancy on record    If patient is pregnant or has had a positive pregnancy test, please check TSH.         Passed - No positive pregnancy test in past 12 months    If patient is pregnant or has had a positive pregnancy test, please check TSH.          simvastatin (ZOCOR) 40 MG tablet [Pharmacy Med Name: SIMVASTATIN 40MG TAB] 90 tablet      Sig: TAKE 1 TABLET BY MOUTH DAILY AT BEDTIME    Statins Protocol Failed    11/23/2017  1:02 AM       Failed - LDL on file in past 12 months    Recent Labs   Lab Test  05/23/16   0927   LDL  76            Passed - No abnormal creatine kinase in past 12 months    No lab results found.         Passed - Recent or future visit with authorizing provider    Patient had office visit in the last year or has a visit in the next 30 days with authorizing provider.  See chart review.              Passed - Patient is age 18 or older       Passed - No active pregnancy on record       Passed - No positive pregnancy test in past 12 months        Medication is being filled for 1 time refill only due to:  Patient needs labs - fasting.

## 2017-12-07 ENCOUNTER — OFFICE VISIT (OUTPATIENT)
Dept: FAMILY MEDICINE | Facility: CLINIC | Age: 67
End: 2017-12-07
Payer: COMMERCIAL

## 2017-12-07 VITALS
HEART RATE: 72 BPM | WEIGHT: 230 LBS | DIASTOLIC BLOOD PRESSURE: 70 MMHG | BODY MASS INDEX: 34.07 KG/M2 | SYSTOLIC BLOOD PRESSURE: 102 MMHG | HEIGHT: 69 IN | OXYGEN SATURATION: 96 %

## 2017-12-07 DIAGNOSIS — Z12.11 SPECIAL SCREENING FOR MALIGNANT NEOPLASMS, COLON: Primary | ICD-10-CM

## 2017-12-07 DIAGNOSIS — E78.5 HYPERLIPIDEMIA LDL GOAL <100: ICD-10-CM

## 2017-12-07 DIAGNOSIS — Z23 NEED FOR PROPHYLACTIC VACCINATION AGAINST STREPTOCOCCUS PNEUMONIAE (PNEUMOCOCCUS): ICD-10-CM

## 2017-12-07 DIAGNOSIS — E03.9 HYPOTHYROIDISM, UNSPECIFIED TYPE: ICD-10-CM

## 2017-12-07 DIAGNOSIS — Z87.898 H/O MOTION SICKNESS: ICD-10-CM

## 2017-12-07 DIAGNOSIS — N32.81 OVERACTIVE BLADDER: ICD-10-CM

## 2017-12-07 DIAGNOSIS — Z13.6 CARDIOVASCULAR SCREENING; LDL GOAL LESS THAN 100: ICD-10-CM

## 2017-12-07 DIAGNOSIS — Z23 NEED FOR PROPHYLACTIC VACCINATION AND INOCULATION AGAINST INFLUENZA: ICD-10-CM

## 2017-12-07 DIAGNOSIS — S83.92XA SPRAIN OF LEFT KNEE, UNSPECIFIED LIGAMENT, INITIAL ENCOUNTER: ICD-10-CM

## 2017-12-07 PROCEDURE — 99214 OFFICE O/P EST MOD 30 MIN: CPT | Mod: 25 | Performed by: NURSE PRACTITIONER

## 2017-12-07 PROCEDURE — 90670 PCV13 VACCINE IM: CPT | Performed by: NURSE PRACTITIONER

## 2017-12-07 PROCEDURE — 90662 IIV NO PRSV INCREASED AG IM: CPT | Performed by: NURSE PRACTITIONER

## 2017-12-07 PROCEDURE — G0008 ADMIN INFLUENZA VIRUS VAC: HCPCS | Performed by: NURSE PRACTITIONER

## 2017-12-07 PROCEDURE — G0009 ADMIN PNEUMOCOCCAL VACCINE: HCPCS | Performed by: NURSE PRACTITIONER

## 2017-12-07 RX ORDER — MECLIZINE HYDROCHLORIDE 25 MG/1
25 TABLET ORAL EVERY 6 HOURS PRN
Qty: 30 TABLET | Refills: 0 | Status: SHIPPED | OUTPATIENT
Start: 2017-12-07 | End: 2018-09-19

## 2017-12-07 RX ORDER — OXYBUTYNIN CHLORIDE 10 MG/1
10 TABLET, EXTENDED RELEASE ORAL DAILY
Qty: 30 TABLET | Refills: 4 | Status: SHIPPED | OUTPATIENT
Start: 2017-12-07 | End: 2017-12-19

## 2017-12-07 RX ORDER — LEVOTHYROXINE SODIUM 75 UG/1
75 TABLET ORAL DAILY
Qty: 90 TABLET | Refills: 3 | Status: SHIPPED | OUTPATIENT
Start: 2017-12-07 | End: 2017-12-19

## 2017-12-07 NOTE — NURSING NOTE
Screening Questionnaire for Adult Immunization    Are you sick today?   No   Do you have allergies to medications, food, a vaccine component or latex?   Yes   Have you ever had a serious reaction after receiving a vaccination?   No   Do you have a long-term health problem with heart disease, lung disease, asthma, kidney disease, metabolic disease (e.g. diabetes), anemia, or other blood disorder?   No   Do you have cancer, leukemia, HIV/AIDS, or any other immune system problem?   No   In the past 3 months, have you taken medications that affect  your immune system, such as prednisone, other steroids, or anticancer drugs; drugs for the treatment of rheumatoid arthritis, Crohn s disease, or psoriasis; or have you had radiation treatments?   No   Have you had a seizure, or a brain or other nervous system problem?   No   During the past year, have you received a transfusion of blood or blood     products, or been given immune (gamma) globulin or antiviral drug?   No   For women: Are you pregnant or is there a chance you could become        pregnant during the next month?   No   Have you received any vaccinations in the past 4 weeks?   No     Immunization questionnaire was positive for at least one answer.  Notified ok per guidelines for prevnar 13 and flu shot.        Per orders of Myrna saunders NP, injection of Flu and Prevnar 13 given by Dwight Alfredo. Patient instructed to remain in clinic for 15 minutes afterwards, and to report any adverse reaction to me immediately.       Screening performed by Dwight Alfredo on 12/7/2017 at 12:12 PM.

## 2017-12-07 NOTE — NURSING NOTE
"Initial /70 (BP Location: Left arm, Patient Position: Chair, Cuff Size: Adult Large)  Pulse 72  Ht 5' 9\" (1.753 m)  Wt 230 lb (104.3 kg)  SpO2 96%  BMI 33.97 kg/m2 Estimated body mass index is 33.97 kg/(m^2) as calculated from the following:    Height as of this encounter: 5' 9\" (1.753 m).    Weight as of this encounter: 230 lb (104.3 kg). .    Lakesha Whitfield    "

## 2017-12-07 NOTE — PROGRESS NOTES
SUBJECTIVE:   Radha Corona is a 67 year old female who presents to clinic today for the following health issues:      Joint Pain    Onset: two weeks    Description:   Location: left knee- twisted when tripped   Character: Dull ache    Intensity: mild    Progression of Symptoms: better    Accompanying Signs & Symptoms:  Other symptoms: none    History:   Previous similar pain: no       Precipitating factors:   Trauma or overuse: YES- fell down the stairs at home    Alleviating factors:  Improved by: rest/inactivity, heat and ice    Therapies Tried and outcome: OTC, some help          Concern - Stress Incontinence  Onset: one month    Description:   Having accidents if she does not make it to the bathroom in time    Intensity: moderate    Progression of Symptoms:  worsening    Accompanying Signs & Symptoms:  none    Previous history of similar problem:   none    Precipitating factors:   Worsened by: nothing    Alleviating factors:  Improved by: nothing    Therapies Tried and outcome: none    Hypothyroidism Follow-up      Since last visit, patient describes the following symptoms: Weight stable, no hair loss, no skin changes, no constipation, no loose stools      Amount of exercise or physical activity: None    Problems taking medications regularly: No    Medication side effects: none    Diet: regular (no restrictions)    Patient going on cruise- she would like a medication for possible dizziness/ sea sickness.     Hyperlipidemia: Patient would like statin refilled  However she has not had yearly lipid panel drawn.          Problem list and histories reviewed & adjusted, as indicated.  Additional history: as documented    Patient Active Problem List   Diagnosis     Lumbar Radiculopathy- s/p fusion at L4-5.     CTS (carpal tunnel syndrome)     Neuropathy     Cerebral infarction (H)     Osteoarthritis     Arthropathy     Grave's disease     Edema leg     Postablative hypothyroidism     Advanced directives,  "counseling/discussion     Hyperlipidemia LDL goal <100     Breast cancer (H)     Chronic atrial fibrillation (H)     Generalized osteoarthrosis, unspecified site     HX: breast cancer     Seasonal allergies     Atrial fibrillation [427.31]     Long term current use of anticoagulant therapy     Migraine without status migrainosus, not intractable, unspecified migraine type     Past Surgical History:   Procedure Laterality Date     ARTHROSCOPY KNEE RT/LT  4/2005     BREAST LUMPECTOMY, RT/LT  2/7/06    Right Breast Lumpectomy     C ORAL SURGERY PROCEDURE      wisdom teeth extraction     COLECTOMY      Reversal of Colostomy 8/05     D & C       HC SACROPLASTY  2001    Hx of Spine Surgery L4-5     HYSTERECTOMY, DIOMEDES  5/2005       Social History   Substance Use Topics     Smoking status: Never Smoker     Smokeless tobacco: Never Used     Alcohol use Yes      Comment: wine occasionally     Family History   Problem Relation Age of Onset     Breast Cancer Mother      DIABETES Father      Hypertension Father      Lipids Father      Neurologic Disorder Daughter      Migraines     Neurologic Disorder Brother      CANCER No family hx of      no hx skin cancer             Reviewed and updated as needed this visit by clinical staff     Reviewed and updated as needed this visit by Provider         ROS:  Constitutional, HEENT, cardiovascular, pulmonary, GI, , musculoskeletal, neuro, skin, endocrine and psych systems are negative, except as otherwise noted.      OBJECTIVE:   /70 (BP Location: Left arm, Patient Position: Chair, Cuff Size: Adult Large)  Pulse 72  Ht 5' 9\" (1.753 m)  Wt 230 lb (104.3 kg)  SpO2 96%  BMI 33.97 kg/m2  Body mass index is 33.97 kg/(m^2).  GENERAL APPEARANCE: alert, no distress and over weight  RESP: lungs clear to auscultation - no rales, rhonchi or wheezes  CV: regular rates and rhythm, normal S1 S2, no S3 or S4 and no murmur, click or rub  ORTHO: Knee Exam: Inspection: No effusion  Tender: MCL, " medial joint line  Non-tender: LCL, lateral joint line, prepatellar bursa, popliteal region  Active Range of Motion: full flexion, no pain with flexion, no pain with extension, Patellofemoral crepitus with extension      Diagnostic Test Results:  none     ASSESSMENT/PLAN:       1. Sprain of left knee, unspecified ligament, initial encounter  Encouraged patient to wear knee brace for support and ice for 15 minutes every 1-2 hrs.     2. Hypothyroidism, unspecified type  stable  - levothyroxine (SYNTHROID/LEVOTHROID) 75 MCG tablet; Take 1 tablet (75 mcg) by mouth daily  Dispense: 90 tablet; Refill: 3    3. Overactive bladder  - consider referral to Urology   - start oxybutynin (DITROPAN XL) 10 MG 24 hr tablet; Take 1 tablet (10 mg) by mouth daily  Dispense: 30 tablet; Refill: 4    4. Special screening for malignant neoplasms, colon    - GASTROENTEROLOGY ADULT REF PROCEDURE ONLY    5. H/O motion sickness  Patient going on cruise ship- concerned about motion sickness  - meclizine (ANTIVERT) 25 MG tablet; Take 1 tablet (25 mg) by mouth every 6 hours as needed for dizziness  Dispense: 30 tablet; Refill: 0    6. Hyperlipidemia LDL goal <100  Tolerating statin but needs statin refilled and updated lipid panel    7. CARDIOVASCULAR SCREENING; LDL GOAL LESS THAN 100    - Lipid panel reflex to direct LDL Fasting; Future  - ALT; Future    8. Need for prophylactic vaccination against Streptococcus pneumoniae (pneumococcus)    - Pneumococcal vaccine 13 valent PCV13 IM (Prevnar) [98385]  - Vaccine Administration, Each Additional [87557]    9. Need for prophylactic vaccination and inoculation against influenza    - FLU VACCINE, INCREASED ANTIGEN, PRESV FREE, AGE 65+ [02214]  - Vaccine Administration, Initial [46496]    Follow up in 2-3 weeks to review lipid panel and follow up on overactive bladder    JANICE [facility]

## 2017-12-07 NOTE — MR AVS SNAPSHOT
After Visit Summary   12/7/2017    Radha Corona    MRN: [id number]           Patient Information     Date Of Birth          1950        Visit Information        Provider Department      12/7/2017 11:00 AM Myrna Staples APRN [facility]        Today's Diagnoses     Special screening for malignant neoplasms, colon    -  1    Hypothyroidism, unspecified type        Overactive bladder        CARDIOVASCULAR SCREENING; LDL GOAL LESS THAN 100          Care Instructions    1. Labs today  2. Schedule fasting lab work within the next 2 weeks  3. Follow up in 2 weeks          Thank you for choosing [facility].  You may be receiving a survey in the mail from [employer] regarding your visit today.  Please take a few minutes to complete and return the survey to let us know how we are doing.      If you have questions or concerns, please contact us via Adly or you can contact your care team at [phone].    Our Clinic hours are:  Monday 6:40 am  to 7:00 pm  Tuesday -Friday 6:40 am to 5:00 pm    The [place] outpatient lab hours are:  Monday - Friday 6:10 am to 4:45 pm  Saturdays 7:00 am to 11:00 am  Appointments are required, call [phone]    If you have clinical questions after hours or would like to schedule an appointment,  call the clinic at [phone].  Knee Sprain    A sprain is an injury to the ligaments or capsule that holds a joint together. There are no broken bones. Most sprains take 3 to 6 weeks to heal. If it a severe sprain where the ligament is completely torn, it can take months to recover.  Most knee sprains are treated with a splint, knee immobilizer brace, or elastic wrap for support. Severe sprains may require surgery.  Home care    Stay off the injured leg as much as possible until you can walk on it without pain. If you have a lot of pain with walking, crutches or a walker may be prescribed. (These can be rented or purchased at many pharmacies  and surgical or orthopedic supply stores). Follow your healthcare provider's advice about when to begin putting weight on that leg.    Keep your leg elevated to reduce pain and swelling. When sleeping, place a pillow under the injured leg. When sitting, support the injured leg so it is level with your waist. This is very important during the first 48 hours.    Apply an ice pack over the injured area for 15 to 20 minutes every 3 to 6 hours. You should do this for the first 24 to 48 hours. You can make an ice pack by filling a plastic bag that seals at the top with ice cubes and then wrapping it with a thin towel. Continue to use ice packs for relief of pain and swelling as needed. As the ice melts, be careful to avoid getting your wrap, splint, or cast wet. After 48 hours, apply heat (warm shower or warm bath) for 15 to 20 minutes several times a day, or alternate ice and heat. You can place the ice pack directly over the splint. If you have to wear a hook-and-loop knee brace, you can open it to apply the ice pack, or heat, directly to the knee. Never put ice directly on the skin. Always wrap the ice in a towel or other type of cloth.    You may use over-the-counter pain medicine to control pain, unless another pain medicine was prescribed.If you have chronic liver or kidney disease or ever had a stomach ulcer or GI bleeding, talk with your healthcare provider before using these medicines.    If you were given a splint, keep it completely dry at all times. Bathe with your splint out of the water, protected with 2 large plastic bags, rubber-banded at the top end. If a fiberglass splint gets wet, you can dry it with a hair dryer. If you have a hook-and-loop knee brace, you can remove this to bathe, unless told otherwise.  Follow-up care  Follow up with your doctor as advised. Any X-rays you had today don t show any broken bones, breaks, or fractures. Sometimes fractures don t show up on the first X-ray. Bruises and  sprains can sometimes hurt as much as a fracture. These injuries can take time to heal completely. If your symptoms don t improve or they get worse, talk with your doctor. You may need a repeat X-ray. If X-rays were taken, you will be told of any new findings that may affect your care.  Call 911  Call 911 if you have:     Shortness of breath     Chest pain  When to seek medical advice  Call your healthcare provider right away if any of these occur:    The splint or knee immobilizer brace becomes wet or soft    The fiberglass cast or splint remains wet for more than 24 hours    Pain or swelling increases    The injured leg or toes become cold, blue, numb, or tingly  Date Last Reviewed: 11/20/2015 2000-2017 [employer]. [address] [place] [address]. All rights reserved. This information is not intended as a substitute for professional medical care. Always follow your healthcare professional's instructions.                Follow-ups after your visit        Additional Services     GASTROENTEROLOGY ADULT REF PROCEDURE ONLY       Last Lab Result: Creatinine (mg/dL)       Date                     Value                 05/23/2016               0.82             ----------  There is no height or weight on file to calculate BMI.     Needed:  No  Language:  English    Patient will be contacted to schedule procedure.     Please be aware that coverage of these services is subject to the terms and limitations of your health insurance plan.  Call member services at your health plan with any benefit or coverage questions.  Any procedures must be performed at a [facility] facility OR coordinated by your clinic's referral office.    Please bring the following with you to your appointment:    (1) Any X-Rays, CTs or MRIs which have been performed.  Contact the facility where they were done to arrange for  prior to your scheduled appointment.    (2) List of current medications   (3) This  "referral request   (4) Any documents/labs given to you for this referral                  Your next 10 appointments already scheduled     Dec 22, 2017  9:45 AM CST   [facility] [place] [facility]    [address] [id number] [phone]            Nov 19, 2018  9:30 AM CST   [facility]    [address] [address]   [phone]              Future tests that were ordered for you today     Open Future Orders        Priority Expected Expires Ordered    Lipid panel reflex to direct LDL Fasting Routine  1/7/2018 12/7/2017    ALT Routine  1/7/2018 12/7/2017            Who to contact     If you have questions or need follow up information about today's clinic visit or your schedule please contact [facility] directly at [phone].  Normal or non-critical lab and imaging results will be communicated to you by [employer], letter or phone within 4 business days after the clinic has received the results. If you do not hear from us within 7 days, please contact the clinic through "[employer]t or phone. If you have a critical or abnormal lab result, we will notify you by phone as soon as possible.  Submit refill requests through [employer] or call your pharmacy and they will forward the refill request to us. Please allow 3 business days for your refill to be completed.          Additional Information About Your Visit        [employer] Information     [employer] lets you send messages to your doctor, view your test results, renew your prescriptions, schedule appointments and more. To sign up, go to [email or web address] . Click on \"Log in\" on the left side of the screen, which will take you to the Welcome page. Then click on \"Sign up Now\" on the right side of the page.     You will be asked to enter the access code listed below, as " "well as some personal information. Please follow the directions to create your username and password.     Your access code is: [email or web address]  Expires: 2018  9:33 AM     Your access code will  in 90 days. If you need help or a new code, please call your [facility] clinic or [phone].        Care EveryWhere ID     This is your Care EveryWhere ID. This could be used by other organizations to access your [facility] medical records  [id number]        Your Vitals Were     Pulse Height Pulse Oximetry BMI (Body Mass Index)          72 5' 9\" (1.753 m) 96% 33.97 kg/m2         Blood Pressure from Last 3 Encounters:   17 102/70   17 114/76   17 124/70    Weight from Last 3 Encounters:   17 230 lb (104.3 kg)   17 226 lb (102.5 kg)   16 221 lb 9.6 oz (100.5 kg)              We Performed the Following     Basic metabolic panel     GASTROENTEROLOGY ADULT REF PROCEDURE ONLY     TSH with free T4 reflex          Today's Medication Changes          These changes are accurate as of: 17 11:44 AM.  If you have any questions, ask your nurse or doctor.               Start taking these medicines.        Dose/Directions    oxybutynin 10 MG 24 hr tablet   Commonly known as:  DITROPAN XL   Used for:  Overactive bladder        Dose:  10 mg   Take 1 tablet (10 mg) by mouth daily   Quantity:  30 tablet   Refills:  4         These medicines have changed or have updated prescriptions.        Dose/Directions    levothyroxine 75 MCG tablet   Commonly known as:  SYNTHROID/LEVOTHROID   This may have changed:  See the new instructions.   Used for:  Hypothyroidism, unspecified type        Dose:  75 mcg   Take 1 tablet (75 mcg) by mouth daily   Quantity:  90 tablet   Refills:  3            Where to get your medicines      These medications were sent to [id number] - [address] [address]     Phone:  [phone]     " levothyroxine 75 MCG tablet    oxybutynin 10 MG 24 hr tablet                Primary Care Provider Office Phone # Fax #    JANICE Nichloe [facility] [phone] [address] [facility] [address]        Equal Access to Services     STEPHON DAVIS : Hadii aad ku hadasho Soomaali, waaxda luqadaha, qaybta kaalmada adeegyada, waxay idiin hayaan adehermelindo parashomar laganga chang. So [facility] [phone].    ATENCIÓN: Si habla español, tiene a howard disposición servicios gratuitos de asistencia lingüística. Llame al [phone].    We comply with applicable federal civil rights laws and [place] laws. We do not discriminate on the basis of race, color, national origin, age, disability, sex, sexual orientation, or gender identity.            Thank you!     Thank you for choosing [facility]  for your care. Our goal is always to provide you with excellent care. Hearing back from our patients is one way we can continue to improve our services. Please take a few minutes to complete the written survey that you may receive in the mail after your visit with us. Thank you!             Your Updated Medication List - Protect others around you: Learn how to safely use, store and throw away your medicines at www.disposemymeds.org.          This list is accurate as of: 12/7/17 11:44 AM.  Always use your most recent med list.                   Brand Name Dispense Instructions for use Diagnosis    acetaminophen-codeine 300-30 MG per tablet    TYLENOL WITH CODEINE #3    30 tablet    Take 1-2 tablets by mouth daily as needed for pain    Migraine without status migrainosus, not intractable, unspecified migraine type       furosemide 40 MG tablet    LASIX    90 tablet    Take 1 tablet (40 mg) by mouth daily as needed    Localized edema       JANTOVEN 2.5 MG tablet   Generic drug:  warfarin     132 tablet    As directed by Anticoagulation Clinic, current dose 2.5 mg Mon, Wed, Fri, 5 mg rest of week    Chronic atrial  fibrillation (H), Long term (current) use of anticoagulants       levothyroxine 75 MCG tablet    SYNTHROID/LEVOTHROID    90 tablet    Take 1 tablet (75 mcg) by mouth daily    Hypothyroidism, unspecified type       metoprolol 50 MG tablet    LOPRESSOR    180 tablet    Take 1 tablet (50 mg) by mouth 2 times daily    Paroxysmal atrial fibrillation (H)       * order for DME     1 each    Equipment being ordered: specialized orthotics for feet    Foot pain       * order for DME     2 Units    Equipment being ordered: wrist splints for CTS    CTS (carpal tunnel syndrome), unspecified laterality       * order for DME     1 Box    Equipment being ordered: insole Arch supports    Foot drop, right       oxybutynin 10 MG 24 hr tablet    DITROPAN XL    30 tablet    Take 1 tablet (10 mg) by mouth daily    Overactive bladder       simvastatin 40 MG tablet    ZOCOR    30 tablet    TAKE 1 TABLET BY MOUTH DAILY AT BEDTIME    Hyperlipidemia LDL goal <100       * Notice:  This list has 3 medication(s) that are the same as other medications prescribed for you. Read the directions carefully, and ask your doctor or other care provider to review them with you.

## 2017-12-07 NOTE — PROGRESS NOTES

## 2017-12-07 NOTE — PATIENT INSTRUCTIONS
1. Labs today  2. Schedule fasting lab work within the next 2 weeks  3. Follow up in 2 weeks          Thank you for choosing [facility].  You may be receiving a survey in the mail from Tatyana Garcias regarding your visit today.  Please take a few minutes to complete and return the survey to let us know how we are doing.      If you have questions or concerns, please contact us via [employer] or you can contact your care team at [phone].    Our Clinic hours are:  Monday 6:40 am  to 7:00 pm  Tuesday -Friday 6:40 am to 5:00 pm    The [place] outpatient lab hours are:  Monday - Friday 6:10 am to 4:45 pm  Saturdays 7:00 am to 11:00 am  Appointments are required, call [phone]    If you have clinical questions after hours or would like to schedule an appointment,  call the clinic at [phone].  Knee Sprain    A sprain is an injury to the ligaments or capsule that holds a joint together. There are no broken bones. Most sprains take 3 to 6 weeks to heal. If it a severe sprain where the ligament is completely torn, it can take months to recover.  Most knee sprains are treated with a splint, knee immobilizer brace, or elastic wrap for support. Severe sprains may require surgery.  Home care    Stay off the injured leg as much as possible until you can walk on it without pain. If you have a lot of pain with walking, crutches or a walker may be prescribed. (These can be rented or purchased at many pharmacies and surgical or orthopedic supply stores). Follow your healthcare provider's advice about when to begin putting weight on that leg.    Keep your leg elevated to reduce pain and swelling. When sleeping, place a pillow under the injured leg. When sitting, support the injured leg so it is level with your waist. This is very important during the first 48 hours.    Apply an ice pack over the injured area for 15 to 20 minutes every 3 to 6 hours. You should do this for the first 24 to 48 hours. You can make an ice pack  by filling a plastic bag that seals at the top with ice cubes and then wrapping it with a thin towel. Continue to use ice packs for relief of pain and swelling as needed. As the ice melts, be careful to avoid getting your wrap, splint, or cast wet. After 48 hours, apply heat (warm shower or warm bath) for 15 to 20 minutes several times a day, or alternate ice and heat. You can place the ice pack directly over the splint. If you have to wear a hook-and-loop knee brace, you can open it to apply the ice pack, or heat, directly to the knee. Never put ice directly on the skin. Always wrap the ice in a towel or other type of cloth.    You may use over-the-counter pain medicine to control pain, unless another pain medicine was prescribed.If you have chronic liver or kidney disease or ever had a stomach ulcer or GI bleeding, talk with your healthcare provider before using these medicines.    If you were given a splint, keep it completely dry at all times. Bathe with your splint out of the water, protected with 2 large plastic bags, rubber-banded at the top end. If a fiberglass splint gets wet, you can dry it with a hair dryer. If you have a hook-and-loop knee brace, you can remove this to bathe, unless told otherwise.  Follow-up care  Follow up with your doctor as advised. Any X-rays you had today don t show any broken bones, breaks, or fractures. Sometimes fractures don t show up on the first X-ray. Bruises and sprains can sometimes hurt as much as a fracture. These injuries can take time to heal completely. If your symptoms don t improve or they get worse, talk with your doctor. You may need a repeat X-ray. If X-rays were taken, you will be told of any new findings that may affect your care.  Call 911  Call 911 if you have:     Shortness of breath     Chest pain  When to seek medical advice  Call your healthcare provider right away if any of these occur:    The splint or knee immobilizer brace becomes wet or soft    The  fiberglass cast or splint remains wet for more than 24 hours    Pain or swelling increases    The injured leg or toes become cold, blue, numb, or tingly  Date Last Reviewed: 11/20/2015 2000-2017 [employer]. [address] [place] [address], [place] [address]. All rights reserved. This information is not intended as a substitute for professional medical care. Always follow your healthcare professional's instructions.

## 2017-12-08 DIAGNOSIS — Z13.6 CARDIOVASCULAR SCREENING; LDL GOAL LESS THAN 100: ICD-10-CM

## 2017-12-08 DIAGNOSIS — E03.9 HYPOTHYROIDISM, UNSPECIFIED TYPE: ICD-10-CM

## 2017-12-08 LAB
ALT SERPL W P-5'-P-CCNC: 47 U/L (ref 0–50)
CHOLEST SERPL-MCNC: 127 MG/DL
HDLC SERPL-MCNC: 47 MG/DL
LDLC SERPL CALC-MCNC: 56 MG/DL
NONHDLC SERPL-MCNC: 80 MG/DL
T4 FREE SERPL-MCNC: 0.9 NG/DL (ref 0.76–1.46)
TRIGL SERPL-MCNC: 121 MG/DL
TSH SERPL DL<=0.005 MIU/L-ACNC: 4.29 MU/L (ref 0.4–4)

## 2017-12-08 PROCEDURE — 84460 ALANINE AMINO (ALT) (SGPT): CPT | Performed by: NURSE PRACTITIONER

## 2017-12-08 PROCEDURE — 84439 ASSAY OF FREE THYROXINE: CPT | Performed by: NURSE PRACTITIONER

## 2017-12-08 PROCEDURE — 36415 COLL VENOUS BLD VENIPUNCTURE: CPT | Performed by: NURSE PRACTITIONER

## 2017-12-08 PROCEDURE — 80061 LIPID PANEL: CPT | Performed by: NURSE PRACTITIONER

## 2017-12-08 PROCEDURE — 84443 ASSAY THYROID STIM HORMONE: CPT | Performed by: NURSE PRACTITIONER

## 2017-12-19 ENCOUNTER — OFFICE VISIT (OUTPATIENT)
Dept: FAMILY MEDICINE | Facility: CLINIC | Age: 67
End: 2017-12-19
Payer: COMMERCIAL

## 2017-12-19 ENCOUNTER — ANTICOAGULATION THERAPY VISIT (OUTPATIENT)
Dept: ANTICOAGULATION | Facility: CLINIC | Age: 67
End: 2017-12-19
Payer: COMMERCIAL

## 2017-12-19 VITALS
DIASTOLIC BLOOD PRESSURE: 69 MMHG | SYSTOLIC BLOOD PRESSURE: 103 MMHG | HEART RATE: 65 BPM | BODY MASS INDEX: 34.07 KG/M2 | HEIGHT: 69 IN | OXYGEN SATURATION: 97 % | WEIGHT: 230 LBS

## 2017-12-19 DIAGNOSIS — R60.0 LOCALIZED EDEMA: ICD-10-CM

## 2017-12-19 DIAGNOSIS — I48.20 CHRONIC ATRIAL FIBRILLATION (H): ICD-10-CM

## 2017-12-19 DIAGNOSIS — G43.909 MIGRAINE WITHOUT STATUS MIGRAINOSUS, NOT INTRACTABLE, UNSPECIFIED MIGRAINE TYPE: ICD-10-CM

## 2017-12-19 DIAGNOSIS — E78.5 HYPERLIPIDEMIA LDL GOAL <100: ICD-10-CM

## 2017-12-19 DIAGNOSIS — I63.9 CEREBRAL INFARCTION (H): ICD-10-CM

## 2017-12-19 DIAGNOSIS — Z79.01 LONG TERM CURRENT USE OF ANTICOAGULANT THERAPY: ICD-10-CM

## 2017-12-19 DIAGNOSIS — I48.0 PAROXYSMAL ATRIAL FIBRILLATION (H): ICD-10-CM

## 2017-12-19 DIAGNOSIS — E03.9 HYPOTHYROIDISM, UNSPECIFIED TYPE: ICD-10-CM

## 2017-12-19 DIAGNOSIS — N32.81 OVERACTIVE BLADDER: ICD-10-CM

## 2017-12-19 DIAGNOSIS — I48.91 ATRIAL FIBRILLATION (H): ICD-10-CM

## 2017-12-19 LAB — INR POINT OF CARE: 2.9 (ref 0.86–1.14)

## 2017-12-19 PROCEDURE — 99214 OFFICE O/P EST MOD 30 MIN: CPT | Performed by: NURSE PRACTITIONER

## 2017-12-19 RX ORDER — OXYBUTYNIN CHLORIDE 10 MG/1
10 TABLET, EXTENDED RELEASE ORAL DAILY
Qty: 90 TABLET | Refills: 3 | Status: SHIPPED | OUTPATIENT
Start: 2017-12-19 | End: 2018-09-19

## 2017-12-19 RX ORDER — ACETAMINOPHEN AND CODEINE PHOSPHATE 300; 30 MG/1; MG/1
1-2 TABLET ORAL DAILY PRN
Qty: 30 TABLET | Refills: 0 | Status: SHIPPED | OUTPATIENT
Start: 2017-12-19 | End: 2018-09-19

## 2017-12-19 RX ORDER — METOPROLOL TARTRATE 50 MG
50 TABLET ORAL 2 TIMES DAILY
Qty: 180 TABLET | Refills: 3 | Status: SHIPPED | OUTPATIENT
Start: 2017-12-19 | End: 2018-09-19

## 2017-12-19 RX ORDER — LEVOTHYROXINE SODIUM 75 UG/1
75 TABLET ORAL DAILY
Qty: 90 TABLET | Refills: 3 | Status: SHIPPED | OUTPATIENT
Start: 2017-12-19 | End: 2018-09-19

## 2017-12-19 RX ORDER — SIMVASTATIN 40 MG
TABLET ORAL
Qty: 90 TABLET | Refills: 3 | Status: SHIPPED | OUTPATIENT
Start: 2017-12-19 | End: 2018-05-17

## 2017-12-19 RX ORDER — WARFARIN SODIUM 2.5 MG/1
TABLET ORAL
Qty: 176 TABLET | Refills: 3 | Status: SHIPPED | OUTPATIENT
Start: 2017-12-19 | End: 2018-09-19

## 2017-12-19 RX ORDER — FUROSEMIDE 40 MG
40 TABLET ORAL DAILY PRN
Qty: 90 TABLET | Refills: 3 | Status: SHIPPED | OUTPATIENT
Start: 2017-12-19 | End: 2018-09-19

## 2017-12-19 RX ORDER — WARFARIN SODIUM 2.5 MG/1
TABLET ORAL
Qty: 176 TABLET | Refills: 0 | Status: SHIPPED | OUTPATIENT
Start: 2017-12-19 | End: 2017-12-19

## 2017-12-19 NOTE — PATIENT INSTRUCTIONS
Thank you for choosing [facility].  You may be receiving a survey in the mail from Tatyana Garcias regarding your visit today.  Please take a few minutes to complete and return the survey to let us know how we are doing.      If you have questions or concerns, please contact us via [employer]games or you can contact your care team at [phone].    Our Clinic hours are:  Monday 6:40 am  to 7:00 pm  Tuesday -Friday 6:40 am to 5:00 pm    The [place] outpatient lab hours are:  Monday - Friday 6:10 am to 4:45 pm  Saturdays 7:00 am to 11:00 am  Appointments are required, call [phone]    If you have clinical questions after hours or would like to schedule an appointment,  call the clinic at [phone].

## 2017-12-19 NOTE — MR AVS SNAPSHOT
After Visit Summary   12/19/2017    Radha Corona    MRN: [id number]           Patient Information     Date Of Birth          1950        Visit Information        Provider Department      12/19/2017 11:00 AM Myrna Staples APRN CNP [facility]        Today's Diagnoses     Chronic atrial fibrillation (H)        Hypothyroidism, unspecified type        Overactive bladder        Hyperlipidemia LDL goal <100        Localized edema        Paroxysmal atrial fibrillation (H)        Migraine without status migrainosus, not intractable, unspecified migraine type          Care Instructions          Thank you for choosing [facility].  You may be receiving a survey in the mail from [facility] regarding your visit today.  Please take a few minutes to complete and return the survey to let us know how we are doing.      If you have questions or concerns, please contact us via "[employer] or you can contact your care team at [phone].    Our Clinic hours are:  Monday 6:40 am  to 7:00 pm  Tuesday -Friday 6:40 am to 5:00 pm    The [place] outpatient lab hours are:  Monday - Friday 6:10 am to 4:45 pm  Saturdays 7:00 am to 11:00 am  Appointments are required, call [phone]    If you have clinical questions after hours or would like to schedule an appointment,  call the clinic at [phone].          Follow-ups after your visit        Your next 10 appointments already scheduled     Nov 19, 2018  9:30 AM CST   Return Visit with Edy Dove MD   [facility]    [address] [address] [phone]              Who to contact     If you have questions or need follow up information about today's clinic visit or your schedule please contact [facility] directly at [phone].  Normal or non-critical lab and imaging results will be communicated to you by MyChart, letter or phone within 4 business days  "after the clinic has received the results. If you do not hear from us within 7 days, please contact the clinic through [employer] or phone. If you have a critical or abnormal lab result, we will notify you by phone as soon as possible.  Submit refill requests through [employer] or call your pharmacy and they will forward the refill request to us. Please allow 3 business days for your refill to be completed.          Additional Information About Your Visit        [employer] Information     [employer] lets you send messages to your doctor, view your test results, renew your prescriptions, schedule appointments and more. To sign up, go to [email or web address] . Click on \"Log in\" on the left side of the screen, which will take you to the Welcome page. Then click on \"Sign up Now\" on the right side of the page.     You will be asked to enter the access code listed below, as well as some personal information. Please follow the directions to create your username and password.     Your access code is: [email or web address]  Expires: 2018  9:33 AM     Your access code will  in 90 days. If you need help or a new code, please call your [place] clinic or [phone].        Care EveryWhere ID     This is your Care EveryWhere ID. This could be used by other organizations to access your [place] medical records  [id number]        Your Vitals Were     Pulse Height Pulse Oximetry BMI (Body Mass Index)          65 5' 9\" (1.753 m) 97% 33.97 kg/m2         Blood Pressure from Last 3 Encounters:   17 103/69   17 102/70   17 114/76    Weight from Last 3 Encounters:   17 230 lb (104.3 kg)   17 230 lb (104.3 kg)   17 226 lb (102.5 kg)              Today, you had the following     No orders found for display         Today's Medication Changes          These changes are accurate as of: 17 11:17 AM.  If you have any questions, ask your nurse or doctor.               Start taking these medicines.        " Dose/Directions    warfarin 2.5 MG tablet   Commonly known as:  JANTOVEN   Used for:  Chronic atrial fibrillation (H)   Started by:  Myrna Staples APRN CNP        As directed by Anticoagulation Clinic, current dose 2.5 mg Mon, Wed, Fri, 5 mg rest of week   Quantity:  176 tablet   Refills:  3         These medicines have changed or have updated prescriptions.        Dose/Directions    simvastatin 40 MG tablet   Commonly known as:  ZOCOR   This may have changed:  See the new instructions.   Used for:  Hyperlipidemia LDL goal <100   Changed by:  Myrna Staples APRN CNP        TAKE 1 TABLET BY MOUTH DAILY AT BEDTIME   Quantity:  90 tablet   Refills:  3            Where to get your medicines      These medications were sent to [facility] [address] [address]     Phone:  [phone]     furosemide 40 MG tablet    levothyroxine 75 MCG tablet    metoprolol 50 MG tablet    oxybutynin 10 MG 24 hr tablet    simvastatin 40 MG tablet    warfarin 2.5 MG tablet         Some of these will need a paper prescription and others can be bought over the counter.  Ask your nurse if you have questions.     Bring a paper prescription for each of these medications     acetaminophen-codeine 300-30 MG per tablet                Primary Care Provider Office Phone # Fax #    JANICE Nichole [phone] [address] [facility] [address]        Equal Access to Services     STEPHON DAVIS AH: Hadii cole ku hadasho Soomaali, waaxda luqadaha, qaybta kaalmada adeegyada, yaritaz ontiveros haybenjamin junior . So [facility] [phone].    ATENCIÓN: Si habla español, tiene a howard disposición servicios gratuitos de asistencia lingüística. Jessee al [phone].    We comply with applicable federal civil rights laws and [place] laws. We do not discriminate on the basis of race, color, national origin, age, disability, sex, sexual  orientation, or gender identity.            Thank you!     Thank you for choosing [facility]  for your care. Our goal is always to provide you with excellent care. Hearing back from our patients is one way we can continue to improve our services. Please take a few minutes to complete the written survey that you may receive in the mail after your visit with us. Thank you!             Your Updated Medication List - Protect others around you: Learn how to safely use, store and throw away your medicines at www.disposemymeds.org.          This list is accurate as of: 12/19/17 11:17 AM.  Always use your most recent med list.                   Brand Name Dispense Instructions for use Diagnosis    acetaminophen-codeine 300-30 MG per tablet    TYLENOL WITH CODEINE #3    30 tablet    Take 1-2 tablets by mouth daily as needed for pain    Migraine without status migrainosus, not intractable, unspecified migraine type       furosemide 40 MG tablet    LASIX    90 tablet    Take 1 tablet (40 mg) by mouth daily as needed    Localized edema       levothyroxine 75 MCG tablet    SYNTHROID/LEVOTHROID    90 tablet    Take 1 tablet (75 mcg) by mouth daily    Hypothyroidism, unspecified type       meclizine 25 MG tablet    ANTIVERT    30 tablet    Take 1 tablet (25 mg) by mouth every 6 hours as needed for dizziness    H/O motion sickness       metoprolol 50 MG tablet    LOPRESSOR    180 tablet    Take 1 tablet (50 mg) by mouth 2 times daily    Paroxysmal atrial fibrillation (H)       * order for DME     1 each    Equipment being ordered: specialized orthotics for feet    Foot pain       * order for DME     2 Units    Equipment being ordered: wrist splints for CTS    CTS (carpal tunnel syndrome), unspecified laterality       * order for DME     1 Box    Equipment being ordered: insole Arch supports    Foot drop, right       oxybutynin 10 MG 24 hr tablet    DITROPAN XL    90 tablet    Take 1 tablet (10 mg) by mouth daily     Overactive bladder       simvastatin 40 MG tablet    ZOCOR    90 tablet    TAKE 1 TABLET BY MOUTH DAILY AT BEDTIME    Hyperlipidemia LDL goal <100       warfarin 2.5 MG tablet    JANTOVEN    176 tablet    As directed by Anticoagulation Clinic, current dose 2.5 mg Mon, Wed, Fri, 5 mg rest of week    Chronic atrial fibrillation (H)       * Notice:  This list has 3 medication(s) that are the same as other medications prescribed for you. Read the directions carefully, and ask your doctor or other care provider to review them with you.

## 2017-12-19 NOTE — NURSING NOTE
"Initial /69 (BP Location: Left arm, Patient Position: Chair, Cuff Size: Adult Regular)  Pulse 65  Ht 5' 9\" (1.753 m)  Wt 230 lb (104.3 kg)  SpO2 97%  BMI 33.97 kg/m2 Estimated body mass index is 33.97 kg/(m^2) as calculated from the following:    Height as of this encounter: 5' 9\" (1.753 m).    Weight as of this encounter: 230 lb (104.3 kg). .    Lakesha Whitfield    "

## 2017-12-19 NOTE — PROGRESS NOTES
SUBJECTIVE:   Radha Corona is a 67 year old female who presents to clinic today for the following health issues:      Hyperlipidemia Follow-Up      Rate your low fat/cholesterol diet?: good    Taking statin?  Yes, no muscle aches from statin    Other lipid medications/supplements?:  None  LDL Cholesterol Calculated   Date Value Ref Range Status   12/08/2017 56 <100 mg/dL Final     Comment:     Desirable:       <100 mg/dl     ]  HDL Cholesterol   Date Value Ref Range Status   12/08/2017 47 (L) >49 mg/dL Final     LDL Cholesterol Calculated   Date Value Ref Range Status   12/08/2017 56 <100 mg/dL Final     Comment:     Desirable:       <100 mg/dl     ]  HDL Cholesterol   Date Value Ref Range Status   12/08/2017 47 (L) >49 mg/dL Final     ]      Atrial fibrillation: controlled    Hypertension Follow-up      Outpatient blood pressures are not being checked.    Low Salt Diet: no added salt  BP Readings from Last 3 Encounters:   12/19/17 103/69   12/07/17 102/70   11/20/17 114/76         Migraine Follow-Up    Headaches symptoms:  Stable     Frequency: every 2-3 year    Duration of headaches: few hours if she can take Tyl #3 right away    Able to do normal daily activities/work with migraines: Yes    Rescue/Relief medication:Tylenol #3              Effectiveness: total relief    Preventative medication: None    Neurologic complications: No new stroke-like symptoms, loss of vision or speech, numbness or weakness    In the past 4 weeks, how often have you gone to Urgent Care or the emergency room because of your headaches?  0    Amount of exercise or physical activity: None    Problems taking medications regularly: No    Medication side effects: none  Diet: regular (no restrictions)      Hypothyroidism Follow-up      Since last visit, patient describes the following symptoms: Weight stable, no hair loss, no skin changes, no constipation, no loose stools  TSH   Date Value Ref Range Status   12/08/2017 4.29 (H) 0.40 -  4.00 mU/L Final         Overactive bladder- controlled with current medications.     Problem list and histories reviewed & adjusted, as indicated.  Additional history: as documented    Patient Active Problem List   Diagnosis     Lumbar Radiculopathy- s/p fusion at L4-5.     CTS (carpal tunnel syndrome)     Neuropathy     Cerebral infarction (H)     Osteoarthritis     Arthropathy     Grave's disease     Edema leg     Postablative hypothyroidism     Advanced directives, counseling/discussion     Hyperlipidemia LDL goal <100     Breast cancer (H)     Chronic atrial fibrillation (H)     Generalized osteoarthrosis, unspecified site     HX: breast cancer     Seasonal allergies     Atrial fibrillation [427.31]     Long term current use of anticoagulant therapy     Migraine without status migrainosus, not intractable, unspecified migraine type     Past Surgical History:   Procedure Laterality Date     ARTHROSCOPY KNEE RT/LT  4/2005     BREAST LUMPECTOMY, RT/LT  2/7/06    Right Breast Lumpectomy     C ORAL SURGERY PROCEDURE      wisdom teeth extraction     COLECTOMY      Reversal of Colostomy 8/05     D & C       HC SACROPLASTY  2001    Hx of Spine Surgery L4-5     HYSTERECTOMY, DIOMEDES  5/2005       Social History   Substance Use Topics     Smoking status: Never Smoker     Smokeless tobacco: Never Used     Alcohol use Yes      Comment: wine occasionally     Family History   Problem Relation Age of Onset     Breast Cancer Mother      DIABETES Father      Hypertension Father      Lipids Father      Neurologic Disorder Daughter      Migraines     Neurologic Disorder Brother      CANCER No family hx of      no hx skin cancer             Reviewed and updated as needed this visit by clinical staff     Reviewed and updated as needed this visit by Provider         ROS:  Constitutional, HEENT, cardiovascular, pulmonary, GI, , musculoskeletal, neuro, skin, endocrine and psych systems are negative, except as otherwise  "noted.      OBJECTIVE:   /69 (BP Location: Left arm, Patient Position: Chair, Cuff Size: Adult Regular)  Pulse 65  Ht 5' 9\" (1.753 m)  Wt 230 lb (104.3 kg)  SpO2 97%  BMI 33.97 kg/m2  Body mass index is 33.97 kg/(m^2).  GENERAL: healthy, alert and no distress  RESP: lungs clear to auscultation - no rales, rhonchi or wheezes  CV: regular rate and rhythm, normal S1 S2, no S3 or S4, no murmur, click or rub, no peripheral edema and peripheral pulses strong  MS: no gross musculoskeletal defects noted, no edema    Diagnostic Test Results:  none     ASSESSMENT/PLAN:         1. Chronic atrial fibrillation (H)  stable  - warfarin (JANTOVEN) 2.5 MG tablet; As directed by Anticoagulation Clinic, current dose 2.5 mg Mon, Wed, Fri, 5 mg rest of week  Dispense: 176 tablet; Refill: 3    2. Hypothyroidism, unspecified type  stable  - levothyroxine (SYNTHROID/LEVOTHROID) 75 MCG tablet; Take 1 tablet (75 mcg) by mouth daily  Dispense: 90 tablet; Refill: 3    3. Overactive bladder  stable  - oxybutynin (DITROPAN XL) 10 MG 24 hr tablet; Take 1 tablet (10 mg) by mouth daily  Dispense: 90 tablet; Refill: 3    4. Hyperlipidemia LDL goal <100  Tolerating statin therapy   - simvastatin (ZOCOR) 40 MG tablet; TAKE 1 TABLET BY MOUTH DAILY AT BEDTIME  Dispense: 90 tablet; Refill: 3    5. Localized edema  stable  - furosemide (LASIX) 40 MG tablet; Take 1 tablet (40 mg) by mouth daily as needed  Dispense: 90 tablet; Refill: 3    6. Paroxysmal atrial fibrillation (H)  controlled  - metoprolol (LOPRESSOR) 50 MG tablet; Take 1 tablet (50 mg) by mouth 2 times daily  Dispense: 180 tablet; Refill: 3    7. Migraine without status migrainosus, not intractable, unspecified migraine type  stable  - acetaminophen-codeine (TYLENOL WITH CODEINE #3) 300-30 MG per tablet; Take 1-2 tablets by mouth daily as needed for pain  Dispense: 30 tablet; Refill: 0    Follow up in 6 months    JANICE Nichole [facility]  "

## 2018-03-02 NOTE — MR AVS SNAPSHOT
After Visit Summary   5/9/2017    Radha Corona    MRN: [id number]           Patient Information     Date Of Birth          1950        Visit Information        Provider Department      5/9/2017 8:15 AM Edy Dove MD [facility]        Care Instructions          Wound Care Instructions     FOR SUPERFICIAL WOUNDS     [facility] [phone]    [facility] [phone]                       AFTER 24 HOURS YOU SHOULD REMOVE THE BANDAGE AND BEGIN DAILY DRESSING CHANGES AS FOLLOWS:     1) Remove Dressing.     2) Clean and dry the area with tap water using a Q-tip or sterile gauze pad.     3) Apply Vaseline, Aquaphor, Polysporin ointment or Bacitracin ointment over entire wound.  Do NOT use Neosporin ointment.     4) Cover the wound with a band-aid, or a sterile non-stick gauze pad and micropore paper tape      REPEAT THESE INSTRUCTIONS AT LEAST ONCE A DAY UNTIL THE WOUND HAS COMPLETELY HEALED.    It is an old wives tale that a wound heals better when it is exposed to air and allowed to dry out. The wound will heal faster with a better cosmetic result if it is kept moist with ointment and covered with a bandage.    **Do not let the wound dry out.**      Supplies Needed:      *Cotton tipped applicators (Q-tips)    *Polysporin Ointment or Bacitracin Ointment (NOT NEOSPORIN)    *Band-aids or non-stick gauze pads and micropore paper tape.      PATIENT INFORMATION:    During the healing process you will notice a number of changes. All wounds develop a small halo of redness surrounding the wound.  This means healing is occurring. Severe itching with extensive redness usually indicates sensitivity to the ointment or bandage tape used to dress the wound.  You should call our office if this develops.      Swelling  and/or discoloration around your surgical site is common, particularly when performed around the eye.    All wounds normally drain.  The larger  Janette Anders in for a follow-up for CKD.  Medications reviewed and teaching completed.  Janette Anders to continue current medictaions.  Patient to RTC as needed as patient requesting providers in [place].      the wound the more drainage there will be.  After 7-10 days, you will notice the wound beginning to shrink and new skin will begin to grow.  The wound is healed when you can see skin has formed over the entire area.  A healed wound has a healthy, shiny look to the surface and is red to dark pink in color to normalize.  Wounds may take approximately 4-6 weeks to heal.  Larger wounds may take 6-8 weeks.  After the wound is healed you may discontinue dressing changes.    You may experience a sensation of tightness as your wound heals. This is normal and will gradually subside.    Your healed wound may be sensitive to temperature changes. This sensitivity improves with time, but if you re having a lot of discomfort, try to avoid temperature extremes.    Patients frequently experience itching after their wound appears to have healed because of the continue healing under the skin.  Plain Vaseline will help relieve the itching.        POSSIBLE COMPLICATIONS    BLEEDIN. Leave the bandage in place.  2. Use tightly rolled up gauze or a cloth to apply direct pressure over the bandage for 30  minutes.  3. Reapply pressure for an additional 30 minutes if necessary  4. Use additional gauze and tape to maintain pressure once the bleeding has stopped.      WOUND CARE INSTRUCTIONS   FOR CRYOSURGERY   This area treated with liquid nitrogen will form a blister. You do not need to bandage the area until after the blister forms and breaks (which may be a few days). When the blister breaks, begin daily dressing changes as follows:   1) Clean and dry the area with tap water using clean Q-tip or sterile gauze pad.   2) Apply Polysporin ointment or Bacitracin ointment over entire wound. Do NOT use Neosporin ointment.   3) Cover the wound with a band-aid or sterile non-stick gauze pad and micropore paper tape.   REPEAT THESE INSTRUCTIONS AT LEAST ONCE A DAY UNTIL THE WOUND HAS COMPLETELY HEALED.   It is an old wives tale that a wound  heals better when it is exposed to air and allowed to dry out. The wound will heal faster with a better cosmetic result if it is kept moist with ointment and covered with a bandage.   Do not let the wound dry out.   IMPORTANT INFORMATION ON REVERSE SIDE   Supplies Needed:   *Cotton tipped applicators (Q-tips)   *Polysporin ointment or Bacitracin ointment (NOT NEOSPORIN)   *Band-aids, or non stick gauze pads and micropore paper tape   PATIENT INFORMATION   During the healing process you will notice a number of changes. All wounds develop a small halo of redness surrounding the wound. This means healing is occurring. Severe itching with extensive redness usually indicates sensitivity to the ointment or bandage tape used to dress the wound. You should call our office if this develops.   Swelling and/or discoloration around your surgical site is common, particularly when performed around the eye.   All wounds normally drain. The larger the wound the more drainage there will be. After 7-10 days, you will notice the wound beginning to shrink and new skin will begin to grow. The wound is healed when you can see skin has formed over the entire area. A healed wound has a healthy, shiny look to the surface and is red to dark pink in color to normalize. Wounds may take approximately 4-6 weeks to heal. Larger wounds may take 6-8 weeks. After the wound is healed you may discontinue dressing changes.   You may experience a sensation of tightness as your wound heals. This is normal and will gradually subside.   Your healed wound may be sensitive to temperature changes. This sensitivity improves with time, but if you re having a lot of discomfort, try to avoid temperature extremes.   Patients frequently experience itching after their wound appears to have healed because of the continue healing under the skin. Plain Vaseline will help relieve the itching.               Follow-ups after your visit        Future tests that were  "ordered for you today     Open Future Orders        Priority Expected Expires Ordered    Lipid panel reflex to direct LDL Routine  2017    TSH with free T4 reflex Routine  2017    Basic metabolic panel Routine  2017    ALT Routine  2017            Who to contact     If you have questions or need follow up information about today's clinic visit or your schedule please contact [facility] directly at [phone].  Normal or non-critical lab and imaging results will be communicated to you by [facility], letter or phone within 4 business days after the clinic has received the results. If you do not hear from us within 7 days, please contact the clinic through [facility] or phone. If you have a critical or abnormal lab result, we will notify you by phone as soon as possible.  Submit refill requests through [facility] or call your pharmacy and they will forward the refill request to us. Please allow 3 business days for your refill to be completed.          Additional Information About Your Visit        [facility] Information     [facility] lets you send messages to your doctor, view your test results, renew your prescriptions, schedule appointments and more. To sign up, go to [email or web address] . Click on \"Log in\" on the left side of the screen, which will take you to the Welcome page. Then click on \"Sign up Now\" on the right side of the page.     You will be asked to enter the access code listed below, as well as some personal information. Please follow the directions to create your username and password.     Your access code is: [email or web address]  Expires: 2017  7:54 AM     Your access code will  in 90 days. If you need help or a new code, please call your [facility] or [phone].        Care EveryWhere ID     This is your Care EveryWhere ID. This could be used by other organizations to access your [place] medical records  [id number]        Your " Vitals Were     Pulse Pulse Oximetry                70 97%           Blood Pressure from Last 3 Encounters:   05/09/17 113/67   05/09/17 136/86   12/13/16 113/69    Weight from Last 3 Encounters:   05/09/17 102.5 kg (226 lb)   12/13/16 100.5 kg (221 lb 9.6 oz)   10/24/16 101.2 kg (223 lb)              Today, you had the following     No orders found for display       Primary Care Provider Office Phone # Fax #    Myrna Leahy JANICE Staples [facility] [phone]       [facility] [address] [address]        Thank you!     Thank you for choosing [facility]  for your care. Our goal is always to provide you with excellent care. Hearing back from our patients is one way we can continue to improve our services. Please take a few minutes to complete the written survey that you may receive in the mail after your visit with us. Thank you!             Your Updated Medication List - Protect others around you: Learn how to safely use, store and throw away your medicines at www.disposemymeds.org.          This list is accurate as of: 5/9/17  8:25 AM.  Always use your most recent med list.                   Brand Name Dispense Instructions for use    acetaminophen-codeine 300-30 MG per tablet    TYLENOL/codeine #3    30 tablet    Take 1-2 tablets by mouth daily as needed for pain       furosemide 40 MG tablet    LASIX    90 tablet    Take 1 tablet (40 mg) by mouth daily as needed       levothyroxine 75 MCG tablet    SYNTHROID/LEVOTHROID    90 tablet    Take 1 tablet (75 mcg) by mouth daily       metoprolol 50 MG tablet    LOPRESSOR    180 tablet    Take 1 tablet (50 mg) by mouth 2 times daily       * order for DME     1 each    Equipment being ordered: specialized orthotics for feet       * order for DME     2 Units    Equipment being ordered: wrist splints for CTS       * order for DME     1 Box    Equipment being ordered: insole Arch supports       simvastatin 40 MG tablet    ZOCOR    90  tablet    Take 1 tablet (40 mg) by mouth At Bedtime       warfarin 2.5 MG tablet    JANTOVEN    132 tablet    Take 2.5mg by mouth on Mon, Wed, Fri; 5mg all other days or as directed by Anticoagulation Clinic       * Notice:  This list has 3 medication(s) that are the same as other medications prescribed for you. Read the directions carefully, and ask your doctor or other care provider to review them with you.

## 2018-03-14 ENCOUNTER — ANTICOAGULATION THERAPY VISIT (OUTPATIENT)
Dept: ANTICOAGULATION | Facility: CLINIC | Age: 68
End: 2018-03-14

## 2018-03-14 DIAGNOSIS — I63.9 CEREBRAL INFARCTION (H): ICD-10-CM

## 2018-03-14 DIAGNOSIS — I48.91 ATRIAL FIBRILLATION (H): ICD-10-CM

## 2018-03-14 DIAGNOSIS — Z79.01 LONG TERM CURRENT USE OF ANTICOAGULANT THERAPY: ICD-10-CM

## 2018-03-14 NOTE — PROGRESS NOTES
"Patient left voicemail with [facility]. She is currently in [place] and has not had her INR drawn. She states \" it is hard to find a facility there to have her INR done and she would have to pay out of pocket. She states \"she is feeling fine. No signs or symptoms of bleeding.\" Attempted to call patient back but was unable to get in contact.    Sher Yolandadorothy Tadeo  3/15/18 1:23pm    Writer called patient and left a non-detailed voicemail to call [facility] back when she received the message. It appears patient is out of state and received a snowbird letter from our clinic to have her INR drawn. Writer wanted to see if patient did indeed have her INR drawn somewhere as our clinic never received any results. If not, she is overdue for her INR.     Yeni Payan RN on 3/14/2018 at 10:07 AM    "

## 2018-03-14 NOTE — MR AVS SNAPSHOT
Radha Corona   3/14/2018   Anticoagulation Therapy Visit    Description:  67 year old female   Provider:  Yeni Payan, RN   Department:  [facility]           INR as of 3/14/2018         The patient was not given dosing instructions in this encounter.      Anticoagulation Summary as of 3/14/2018         The patient was not given dosing instructions in this encounter.      December 2017 Details    Sun Mon Tue Wed Thu Fri Sat          1               2                 3               4               5               6               7               8               9                 10               11               12               13               14               15               16                 17               18               19      5 mg   See details      20      2.5 mg         21      5 mg         22      2.5 mg         23      5 mg           24      5 mg         25      2.5 mg         26      5 mg         27      2.5 mg         28      5 mg         29      2.5 mg         30      5 mg           31      5 mg                Date Details   12/19 This INR check               How to take your warfarin dose     To take:  2.5 mg Take 1 of the 2.5 mg tablets.    To take:  5 mg Take 2 of the 2.5 mg tablets.           January 2018 Details    Sun Mon Tue Wed Thu Fri Sat      1      2.5 mg         2      5 mg         3      2.5 mg         4      5 mg         5      2.5 mg         6      5 mg           7      5 mg         8      2.5 mg         9      5 mg         10      2.5 mg         11      5 mg         12      2.5 mg         13      5 mg           14      5 mg         15      2.5 mg         16      5 mg         17      2.5 mg         18      5 mg         19      2.5 mg         20      5 mg           21      5 mg         22      2.5 mg         23      5 mg         24      2.5 mg         25      5 mg         26      2.5 mg         27      5 mg           28      5 mg         29      2.5 mg         30       5 mg         31      2.5 mg             Date Details   No additional details            How to take your warfarin dose     To take:  2.5 mg Take 1 of the 2.5 mg tablets.    To take:  5 mg Take 2 of the 2.5 mg tablets.           February 2018 Details    Sun Mon Tue Wed Thu Fri Sat         1      5 mg         2      2.5 mg         3      5 mg           4      5 mg         5      2.5 mg         6      5 mg         7      2.5 mg         8      5 mg         9      2.5 mg         10      5 mg           11      5 mg         12      2.5 mg         13            14               15               16               17                 18               19               20               21               22               23               24                 25               26               27               28                   Date Details   No additional details    Date of next INR:  2/13/2018         How to take your warfarin dose     To take:  2.5 mg Take 1 of the 2.5 mg tablets.    To take:  5 mg Take 2 of the 2.5 mg tablets.

## 2018-03-21 ENCOUNTER — ANTICOAGULATION THERAPY VISIT (OUTPATIENT)
Dept: ANTICOAGULATION | Facility: CLINIC | Age: 68
End: 2018-03-21
Payer: COMMERCIAL

## 2018-03-21 DIAGNOSIS — Z79.01 LONG TERM CURRENT USE OF ANTICOAGULANT THERAPY: ICD-10-CM

## 2018-03-21 LAB — INR PPP: 2.4

## 2018-03-21 PROCEDURE — 99207 ZZC NO CHARGE NURSE ONLY: CPT

## 2018-03-21 NOTE — MR AVS SNAPSHOT
Radha Corona   3/21/2018   Anticoagulation Therapy Visit    Description:  67 year old female   Provider:  Mary Ann Meng, RN   Department:  [facility]           INR as of 3/21/2018         The patient was not given dosing instructions in this encounter.      Anticoagulation Summary as of 3/21/2018         The patient was not given dosing instructions in this encounter.      December 2017 Details    Sun Mon Tue Wed Thu Fri Sat          1               2                 3               4               5               6               7               8               9                 10               11               12               13               14               15               16                 17               18               19      5 mg   See details      20      2.5 mg         21      5 mg         22      2.5 mg         23      5 mg           24      5 mg         25      2.5 mg         26      5 mg         27      2.5 mg         28      5 mg         29      2.5 mg         30      5 mg           31      5 mg                Date Details   12/19 This INR check               How to take your warfarin dose     To take:  2.5 mg Take 1 of the 2.5 mg tablets.    To take:  5 mg Take 2 of the 2.5 mg tablets.           January 2018 Details    Sun Mon Tue Wed Thu Fri Sat      1      2.5 mg         2      5 mg         3      2.5 mg         4      5 mg         5      2.5 mg         6      5 mg           7      5 mg         8      2.5 mg         9      5 mg         10      2.5 mg         11      5 mg         12      2.5 mg         13      5 mg           14      5 mg         15      2.5 mg         16      5 mg         17      2.5 mg         18      5 mg         19      2.5 mg         20      5 mg           21      5 mg         22      2.5 mg         23      5 mg         24      2.5 mg         25      5 mg         26      2.5 mg         27      5 mg           28      5 mg         29      2.5 mg         30      5  mg         31      2.5 mg             Date Details   No additional details            How to take your warfarin dose     To take:  2.5 mg Take 1 of the 2.5 mg tablets.    To take:  5 mg Take 2 of the 2.5 mg tablets.           February 2018 Details    Sun Mon Tue Wed Thu Fri Sat         1      5 mg         2      2.5 mg         3      5 mg           4      5 mg         5      2.5 mg         6      5 mg         7      2.5 mg         8      5 mg         9      2.5 mg         10      5 mg           11      5 mg         12      2.5 mg         13            14               15               16               17                 18               19               20               21               22               23               24                 25               26               27               28                   Date Details   No additional details    Date of next INR:  2/13/2018         How to take your warfarin dose     To take:  2.5 mg Take 1 of the 2.5 mg tablets.    To take:  5 mg Take 2 of the 2.5 mg tablets.

## 2018-03-22 NOTE — ADDENDUM NOTE
Addended by: WANG SCHNEIDER on: 3/22/2018 10:05 AM     Modules accepted: Saundra     Duration Of Freeze Thaw-Cycle (Seconds): 1 Post-Care Instructions: I reviewed with the patient in detail post-care instructions. Patient is to wear sunprotection, and avoid picking at any of the treated lesions. Pt may apply Vaseline to crusted or scabbing areas. Number Of Freeze-Thaw Cycles: 3 freeze-thaw cycles Render Post-Care Instructions In Note?: yes Consent: The patient's consent was obtained including but not limited to risks of crusting, scabbing, blistering, scarring, darker or lighter pigmentary change, recurrence, incomplete removal and infection. Detail Level: Detailed

## 2018-03-22 NOTE — PROGRESS NOTES
ANTICOAGULATION FOLLOW-UP CLINIC VISIT    Patient Name:  Radha Corona  Date:  3/22/2018  Contact Type:  Telephone/ fax from outside lab (patient states she went to [facility])    SUBJECTIVE:     Patient Findings     Positives No Problem Findings    Comments Writer left non detailed message for patient to call [facility] back.    ADDENDUM: No changes in medications, diet, or activity noted. Took warfarin as instructed, denies any missed doses. Patient will not be back in [place] until the beginning of May. She will call to make an appointment when she returns.            OBJECTIVE    INR   Date Value Ref Range Status   03/20/2018 2.4  Final       ASSESSMENT / PLAN  No question data found.  Anticoagulation Summary as of 3/21/2018     INR goal 2.0-3.0   Today's INR 2.4 (3/20/2018)   Maintenance plan 2.5 mg (2.5 mg x 1) on Mon, Wed, Fri; 5 mg (2.5 mg x 2) all other days   Full instructions 2.5 mg on Mon, Wed, Fri; 5 mg all other days   Weekly total 27.5 mg   No change documented Yein Payan RN   Plan last modified Melissa Mcmillan, RN (3/24/2015)   Next INR check 5/3/2018   Priority INR   Target end date     Indications   Cerebral infarction (H) [I63.9]  Atrial fibrillation [427.31] [I48.91]  Long term current use of anticoagulant therapy [Z79.01]         Anticoagulation Episode Summary     INR check location     Preferred lab     Send INR reminders to [facility] POOL    Comments * 2.5mg tablets. 12/22 - needs snowbird letter      Anticoagulation Care Providers     Provider Role Specialty Phone number    Myrna Staples, JANICE CNP Responsible Nurse Practitioner [phone]            See the Encounter Report to view Anticoagulation Flowsheet and Dosing Calendar (Go to Encounters tab in chart review, and find the Anticoagulation Therapy Visit)        Yeni Payan, IMAN

## 2018-05-15 ENCOUNTER — ANTICOAGULATION THERAPY VISIT (OUTPATIENT)
Dept: ANTICOAGULATION | Facility: CLINIC | Age: 68
End: 2018-05-15
Payer: COMMERCIAL

## 2018-05-15 DIAGNOSIS — I63.9 CEREBRAL INFARCTION (H): ICD-10-CM

## 2018-05-15 DIAGNOSIS — I48.91 ATRIAL FIBRILLATION (H): ICD-10-CM

## 2018-05-15 DIAGNOSIS — Z79.01 LONG TERM CURRENT USE OF ANTICOAGULANT THERAPY: ICD-10-CM

## 2018-05-15 LAB — INR POINT OF CARE: 3 (ref 0.86–1.14)

## 2018-05-15 PROCEDURE — 85610 PROTHROMBIN TIME: CPT | Mod: QW

## 2018-05-15 PROCEDURE — 36416 COLLJ CAPILLARY BLOOD SPEC: CPT

## 2018-05-15 PROCEDURE — 99207 ZZC NO CHARGE NURSE ONLY: CPT

## 2018-05-15 NOTE — PROGRESS NOTES
ANTICOAGULATION FOLLOW-UP CLINIC VISIT    Patient Name:  Radha Corona  Date:  5/15/2018  Contact Type:  Face to Face    SUBJECTIVE:     Patient Findings     Positives No Problem Findings    Comments Pt has [place] of [place] for the winter, just returned, denies changes in medications, diet, activity or health, reports taking warfarin as directed.    Pt declined to make follow-up appt, states she will call and schedule appt when she knows her schedule, also reports she will call if she has questions or issues with bleeding or anything related to her warfarin use.             OBJECTIVE    INR Protime   Date Value Ref Range Status   05/15/2018 3.0 (A) 0.86 - 1.14 Final       ASSESSMENT / PLAN  INR assessment THER    Recheck INR In: 6 WEEKS    INR Location Clinic      Anticoagulation Summary as of 5/15/2018     INR goal 2.0-3.0   Today's INR 3.0   Maintenance plan 2.5 mg (2.5 mg x 1) on Mon, Wed, Fri; 5 mg (2.5 mg x 2) all other days   Full instructions 2.5 mg on Mon, Wed, Fri; 5 mg all other days   Weekly total 27.5 mg   Plan last modified Melissa Mcmillan, RN (3/24/2015)   Next INR check 6/26/2018   Priority INR   Target end date Indefinite    Indications   Cerebral infarction (H) [I63.9]  Atrial fibrillation [427.31] [I48.91]  Long term current use of anticoagulant therapy [Z79.01]         Anticoagulation Episode Summary     INR check location     Preferred lab     Send INR reminders to [facility] POOL    Comments * 2.5mg tablets. 12/22 - needs snowbird letter      Anticoagulation Care Providers     Provider Role Specialty Phone number    JhoanMyrna APRN CNP Responsible Nurse Practitioner [phone]            See the Encounter Report to view Anticoagulation Flowsheet and Dosing Calendar (Go to Encounters tab in chart review, and find the Anticoagulation Therapy Visit)        Lissa Oconnell, IMAN

## 2018-05-15 NOTE — MR AVS SNAPSHOT
Luzmariariwilbert Corona   5/15/2018 1:00 PM   Anticoagulation Therapy Visit    Description:  68 year old female   Provider:  [place] [facility]   Department:  [facility]           INR as of 5/15/2018     Today's INR 3.0      Anticoagulation Summary as of 5/15/2018     INR goal 2.0-3.0   Today's INR 3.0   Full instructions 2.5 mg on Mon, Wed, Fri; 5 mg all other days   Next INR check 6/26/2018    Indications   Cerebral infarction (H) [I63.9]  Atrial fibrillation [427.31] [I48.91]  Long term current use of anticoagulant therapy [Z79.01]         Contact Numbers     Please call [phone] with any problems or questions regarding your therapy.    If you need to cancel and/or reschedule your appointment please call one of the following numbers:  [facility] - [phone]  [place] - [phone]  [place] - [phone]  [place] - [phone]  [place] - [phone]            May 2018 Details    Sun Mon Tue Wed Thu Fri Sat       1               2               3               4               5                 6               7               8               9               10               11               12                 13               14               15      5 mg   See details      16      2.5 mg         17      5 mg         18      2.5 mg         19      5 mg           20      5 mg         21      2.5 mg         22      5 mg         23      2.5 mg         24      5 mg         25      2.5 mg         26      5 mg           27      5 mg         28      2.5 mg         29      5 mg         30      2.5 mg         31      5 mg            Date Details   05/15 This INR check               How to take your warfarin dose     To take:  2.5 mg Take 1 of the 2.5 mg tablets.    To take:  5 mg Take 2 of the 2.5 mg tablets.           June 2018 Details    Sun Mon Tue Wed Thu Fri Sat          1      2.5 mg         2      5 mg           3      5 mg         4      2.5 mg         5      5 mg         6      2.5 mg         7      5 mg          8      2.5 mg         9      5 mg           10      5 mg         11      2.5 mg         12      5 mg         13      2.5 mg         14      5 mg         15      2.5 mg         16      5 mg           17      5 mg         18      2.5 mg         19      5 mg         20      2.5 mg         21      5 mg         22      2.5 mg         23      5 mg           24      5 mg         25      2.5 mg         26            27               28               29               30                Date Details   No additional details    Date of next INR:  6/26/2018         How to take your warfarin dose     To take:  2.5 mg Take 1 of the 2.5 mg tablets.    To take:  5 mg Take 2 of the 2.5 mg tablets.

## 2018-05-17 ENCOUNTER — OFFICE VISIT (OUTPATIENT)
Dept: FAMILY MEDICINE | Facility: CLINIC | Age: 68
End: 2018-05-17
Payer: COMMERCIAL

## 2018-05-17 VITALS
TEMPERATURE: 97.2 F | WEIGHT: 227 LBS | HEART RATE: 83 BPM | SYSTOLIC BLOOD PRESSURE: 115 MMHG | DIASTOLIC BLOOD PRESSURE: 74 MMHG | HEIGHT: 69 IN | BODY MASS INDEX: 33.62 KG/M2

## 2018-05-17 DIAGNOSIS — E78.5 HYPERLIPIDEMIA LDL GOAL <70: ICD-10-CM

## 2018-05-17 DIAGNOSIS — M25.562 CHRONIC PAIN OF LEFT KNEE: ICD-10-CM

## 2018-05-17 DIAGNOSIS — E89.0 POSTABLATIVE HYPOTHYROIDISM: Primary | ICD-10-CM

## 2018-05-17 DIAGNOSIS — G89.29 CHRONIC PAIN OF LEFT KNEE: ICD-10-CM

## 2018-05-17 DIAGNOSIS — E07.9 SWELLING OF THYROID GLAND: ICD-10-CM

## 2018-05-17 LAB
T4 FREE SERPL-MCNC: 0.91 NG/DL (ref 0.76–1.46)
TSH SERPL DL<=0.005 MIU/L-ACNC: 4.03 MU/L (ref 0.4–4)

## 2018-05-17 PROCEDURE — 99214 OFFICE O/P EST MOD 30 MIN: CPT | Performed by: NURSE PRACTITIONER

## 2018-05-17 PROCEDURE — 84443 ASSAY THYROID STIM HORMONE: CPT | Performed by: NURSE PRACTITIONER

## 2018-05-17 PROCEDURE — 84439 ASSAY OF FREE THYROXINE: CPT | Performed by: NURSE PRACTITIONER

## 2018-05-17 PROCEDURE — 36415 COLL VENOUS BLD VENIPUNCTURE: CPT | Performed by: NURSE PRACTITIONER

## 2018-05-17 RX ORDER — ATORVASTATIN CALCIUM 20 MG/1
20 TABLET, FILM COATED ORAL DAILY
Qty: 90 TABLET | Refills: 3 | Status: SHIPPED | OUTPATIENT
Start: 2018-05-17 | End: 2018-09-19

## 2018-05-17 NOTE — MR AVS SNAPSHOT
After Visit Summary   5/17/2018    Radha Corona    MRN: [id number]           Patient Information     Date Of Birth          1950        Visit Information        Provider Department      5/17/2018 9:00 AM Myrna Staples APRN [facility]        Today's Diagnoses     Postablative hypothyroidism    -  1    Chronic pain of left knee        Swelling of thyroid gland          Care Instructions    1. Schedule an appointment with Sports Medicine clinic  2. Lab work today  3. Stop Simvastatin and start Atorvastatin   4. Schedule an appointment with Dr. Tejada- Endocrinology  5. Schedule thyroid ultrasound       Thank you for choosing [facility].  You may be receiving a survey in the mail from [employer] regarding your visit today.  Please take a few minutes to complete and return the survey to let us know how we are doing.      If you have questions or concerns, please contact us via InSample or you can contact your care team at [phone].    Our Clinic hours are:  Monday 6:40 am  to 7:00 pm  Tuesday -Friday 6:40 am to 5:00 pm    The [place] outpatient lab hours are:  Monday - Friday 6:10 am to 4:45 pm  Saturdays 7:00 am to 11:00 am  Appointments are required, call [phone]    If you have clinical questions after hours or would like to schedule an appointment,  call the clinic at [phone].          Follow-ups after your visit        Additional Services     ENDOCRINOLOGY ADULT REFERRAL       Your provider has referred you to: N: [facility] [phone]   [email or web address] Dr. Tejada      Please be aware that coverage of these services is subject to the terms and limitations of your health insurance plan.  Call member services at your health plan with any benefit or coverage questions.      Please bring the following to your appointment:    >>   Any x-rays, CTs or MRIs which have been performed.  Contact the  facility where they were done to arrange for  prior to your scheduled appointment.    >>   List of current medications   >>   This referral request   >>   Any documents/labs given to you for this referral            ORTHO  REFERRAL       [facility] is referring you to the Orthopedic  Services at [facility].       The  Representative will assist you in the coordination of your Orthopedic and Musculoskeletal Care as prescribed by your physician.    The  Representative will call you within 1 business day to help schedule your appointment, or you may contact the  Representative at:    All areas ~ [phone]     Type of Referral : Non Surgical       Timeframe requested: Routine    Coverage of these services is subject to the terms and limitations of your health insurance plan.  Please call member services at your health plan with any benefit or coverage questions.      If X-rays, CT or MRI's have been performed, please contact the facility where they were done to arrange for , prior to your scheduled appointment.  Please bring this referral request to your appointment and present it to your specialist.                  Your next 10 appointments already scheduled     Nov 19, 2018  9:30 AM CST   Return Visit with Edy Dove MD   [facility] ([facility])    [address] [address] [phone]              Future tests that were ordered for you today     Open Future Orders        Priority Expected Expires Ordered    US Thyroid Routine  5/17/2019 5/17/2018            Who to contact     If you have questions or need follow up information about today's clinic visit or your schedule please contact [facility] directly at [phone].  Normal or non-critical lab and imaging results will be communicated to you by MyChart, letter or phone within 4  "business days after the clinic has received the results. If you do not hear from us within 7 days, please contact the clinic through [employer] or phone. If you have a critical or abnormal lab result, we will notify you by phone as soon as possible.  Submit refill requests through [employer] or call your pharmacy and they will forward the refill request to us. Please allow 3 business days for your refill to be completed.          Additional Information About Your Visit        [employer] Information     [employer] lets you send messages to your doctor, view your test results, renew your prescriptions, schedule appointments and more. To sign up, go to [email or web address] . Click on \"Log in\" on the left side of the screen, which will take you to the Welcome page. Then click on \"Sign up Now\" on the right side of the page.     You will be asked to enter the access code listed below, as well as some personal information. Please follow the directions to create your username and password.     Your access code is: [email or web address]  Expires: 8/15/2018  9:42 AM     Your access code will  in 90 days. If you need help or a new code, please call your [facility] clinic or [phone].        Care EveryWhere ID     This is your Care EveryWhere ID. This could be used by other organizations to access your [facility] medical records  [id number]        Your Vitals Were     Pulse Temperature Height BMI (Body Mass Index)          83 97.2  F (36.2  C) (Tympanic) 5' 9\" (1.753 m) 33.52 kg/m2         Blood Pressure from Last 3 Encounters:   18 115/74   17 103/69   17 102/70    Weight from Last 3 Encounters:   18 227 lb (103 kg)   17 230 lb (104.3 kg)   17 230 lb (104.3 kg)              We Performed the Following     ENDOCRINOLOGY ADULT REFERRAL     ORTHO  REFERRAL     TSH with free T4 reflex        Primary Care Provider Office Phone # Fax #    Myrna JANICE Galicia [phone]       " [address] [address]        Equal Access to Services     STEPHON DAVIS : Hadii aad ku hadnormalydia Kennedy, wacanda antionettechekoha, qaindigota karenzomelanie andradejajamelanie, yaritza velazquezjoeyhomar chang. So [facility] [phone].    ATENCIÓN: Si habla español, tiene a howard disposición servicios gratuitos de asistencia lingüística. Llame al [phone].    We comply with applicable federal civil rights laws and [place] laws. We do not discriminate on the basis of race, color, national origin, age, disability, sex, sexual orientation, or gender identity.            Thank you!     Thank you for choosing [facility]  for your care. Our goal is always to provide you with excellent care. Hearing back from our patients is one way we can continue to improve our services. Please take a few minutes to complete the written survey that you may receive in the mail after your visit with us. Thank you!             Your Updated Medication List - Protect others around you: Learn how to safely use, store and throw away your medicines at www.disposemymeds.org.          This list is accurate as of 5/17/18  9:50 AM.  Always use your most recent med list.                   Brand Name Dispense Instructions for use Diagnosis    acetaminophen-codeine 300-30 MG per tablet    TYLENOL WITH CODEINE #3    30 tablet    Take 1-2 tablets by mouth daily as needed for pain    Migraine without status migrainosus, not intractable, unspecified migraine type       furosemide 40 MG tablet    LASIX    90 tablet    Take 1 tablet (40 mg) by mouth daily as needed    Localized edema       levothyroxine 75 MCG tablet    SYNTHROID/LEVOTHROID    90 tablet    Take 1 tablet (75 mcg) by mouth daily    Hypothyroidism, unspecified type       meclizine 25 MG tablet    ANTIVERT    30 tablet    Take 1 tablet (25 mg) by mouth every 6 hours as needed for dizziness    H/O motion sickness       metoprolol tartrate 50 MG tablet    LOPRESSOR    180 tablet    Take 1  tablet (50 mg) by mouth 2 times daily    Paroxysmal atrial fibrillation (H)       * order for DME     1 each    Equipment being ordered: specialized orthotics for feet    Foot pain       * order for DME     2 Units    Equipment being ordered: wrist splints for CTS    CTS (carpal tunnel syndrome), unspecified laterality       * order for DME     1 Box    Equipment being ordered: insole Arch supports    Foot drop, right       oxybutynin 10 MG 24 hr tablet    DITROPAN XL    90 tablet    Take 1 tablet (10 mg) by mouth daily    Overactive bladder       warfarin 2.5 MG tablet    JANTOVEN    176 tablet    As directed by Anticoagulation Clinic, current dose 2.5 mg Mon, Wed, Fri, 5 mg rest of week    Chronic atrial fibrillation (H)       * Notice:  This list has 3 medication(s) that are the same as other medications prescribed for you. Read the directions carefully, and ask your doctor or other care provider to review them with you.

## 2018-05-17 NOTE — PROGRESS NOTES
SUBJECTIVE:   Radha Corona is a 68 year old female who presents to clinic today for the following health issues:      Hyperlipidemia Follow-Up      Rate your low fat/cholesterol diet?: good    Taking statin?  Yes, muscle aches after starting statin    Other lipid medications/supplements?:  None    - patient stopped Simvastatin for 1 week and it helped her joint/muscle aches and swelling of her right LE.     Hypothyroidism Follow-up      Since last visit, patient describes the following symptoms: Weight stable, no hair loss, no skin changes, no constipation, no loose stools      Amount of exercise or physical activity: house and yard     Problems taking medications regularly: Yes,  side effects    Medication side effects: muscle aches    Diet: low fat/cholesterol    - feels a full sensation in her throat X 1 month-    History Hypothyroidism due to radioiodine- last seen Endocrinology in 2012- patient would like referral back to Endocrinology       Joint Pain/L knee pain     Onset: 12/2017    Description: Patient 1st injured her foot in sept 2017 and increased pain in Dec 2017 still is having trouble   Location: left knee  Character: Sharp with walking  and Dull ache    Intensity: moderate    Progression of Symptoms: worse    Accompanying Signs & Symptoms:  Other symptoms: none    History:   Previous similar pain: YES- 9-2017 from and injury       Precipitating factors:   Trauma or overuse: YES- trauma started with an injury stepping off steps and twisted for     Alleviating factors:  Improved by: rest/inactivity, heat, ice, massage, stretching and support wrap improved but only for a little     Therapies Tried and outcome: same         -------------------------------------    Problem list and histories reviewed & adjusted, as indicated.  Additional history: as documented    Patient Active Problem List   Diagnosis     Lumbar Radiculopathy- s/p fusion at L4-5.     CTS (carpal tunnel syndrome)     Neuropathy      "Cerebral infarction (H)     Osteoarthritis     Arthropathy     Grave's disease     Edema leg     Postablative hypothyroidism     Advanced directives, counseling/discussion     Hyperlipidemia LDL goal <100     Breast cancer (H)     Chronic atrial fibrillation (H)     Generalized osteoarthrosis, unspecified site     HX: breast cancer     Seasonal allergies     Atrial fibrillation [427.31]     Long term current use of anticoagulant therapy     Migraine without status migrainosus, not intractable, unspecified migraine type     Past Surgical History:   Procedure Laterality Date     ARTHROSCOPY KNEE RT/LT  4/2005     BREAST LUMPECTOMY, RT/LT  2/7/06    Right Breast Lumpectomy     C ORAL SURGERY PROCEDURE      wisdom teeth extraction     COLECTOMY      Reversal of Colostomy 8/05     D & C       HC SACROPLASTY  2001    Hx of Spine Surgery L4-5     HYSTERECTOMY, DIOMEDES  5/2005       Social History   Substance Use Topics     Smoking status: Never Smoker     Smokeless tobacco: Never Used     Alcohol use Yes      Comment: wine occasionally     Family History   Problem Relation Age of Onset     Breast Cancer Mother      DIABETES Father      Hypertension Father      Lipids Father      Neurologic Disorder Daughter      Migraines     Neurologic Disorder Brother      CANCER No family hx of      no hx skin cancer           Reviewed and updated as needed this visit by clinical staff  Tobacco  Allergies  Med Hx  Surg Hx  Fam Hx  Soc Hx      Reviewed and updated as needed this visit by Provider         ROS:  Constitutional, HEENT, cardiovascular, pulmonary, GI, , musculoskeletal, neuro, skin, endocrine and psych systems are negative, except as otherwise noted.    OBJECTIVE:     /74 (BP Location: Left arm, Cuff Size: Adult Regular)  Pulse 83  Temp 97.2  F (36.2  C) (Tympanic)  Ht 5' 9\" (1.753 m)  Wt 227 lb (103 kg)  BMI 33.52 kg/m2  Body mass index is 33.52 kg/(m^2).  GENERAL APPEARANCE: alert, no distress and over " weight  NECK: no adenopathy and no asymmetry, masses, or scars  RESP: lungs clear to auscultation - no rales, rhonchi or wheezes  CV: regular rates and rhythm, normal S1 S2, no S3 or S4 and no murmur, click or rub  ORTHO: Knee Exam: Inspection: No effusion  Tender: medial patellar facet, medial joint line    Active Range of Motion: pain with flexion, pain with extension      Diagnostic Test Results:  none     ASSESSMENT/PLAN:         1. Postablative hypothyroidism    - TSH with free T4 reflex  - ENDOCRINOLOGY ADULT REFERRAL  - US Thyroid; Future    2. Chronic pain of left knee  Ongoing knee pain for > 6 months  - ORTHO  REFERRAL    3. Swelling of thyroid gland    - US Thyroid; Future    4. Hyperlipidemia LDL goal <70  - stop Simvastatin due to myalgia   - start atorvastatin (LIPITOR) 20 MG tablet; Take 1 tablet (20 mg) by mouth daily  Dispense: 90 tablet; Refill: 3        JANICE Nichole [facility]

## 2018-05-17 NOTE — LETTER
[facility]  [address] [address]  Phone: [phone]    05/17/18    Radha Corona  [address] [place] [id number]      Radha,        We are writing to inform you of the results from your recent lab work, included is a copy of those results.  Component      Latest Ref Rng & Units 5/17/2018   TSH      0.40 - 4.00 mU/L 4.03 (H)   T4 Free      0.76 - 1.46 ng/dL 0.91     TSH is stable  If you have any questions, please do not hesitate to call our office.        Sincerely,      JANICE Nichole CNP

## 2018-05-17 NOTE — PATIENT INSTRUCTIONS
1. Schedule an appointment with Sports Medicine clinic  2. Lab work today  3. Stop Simvastatin and start Atorvastatin   4. Schedule an appointment with Dr. Tejada- Endocrinology  5. Schedule thyroid ultrasound       Thank you for choosing [facility].  You may be receiving a survey in the mail from Tatyana Garcias regarding your visit today.  Please take a few minutes to complete and return the survey to let us know how we are doing.      If you have questions or concerns, please contact us via [employer] or you can contact your care team at [phone].    Our Clinic hours are:  Monday 6:40 am  to 7:00 pm  Tuesday -Friday 6:40 am to 5:00 pm    The [place] outpatient lab hours are:  Monday - Friday 6:10 am to 4:45 pm  Saturdays 7:00 am to 11:00 am  Appointments are required, call [phone]    If you have clinical questions after hours or would like to schedule an appointment,  call the clinic at [phone].

## 2018-05-18 ENCOUNTER — HOSPITAL ENCOUNTER (OUTPATIENT)
Dept: ULTRASOUND IMAGING | Facility: CLINIC | Age: 68
Discharge: HOME | End: 2018-05-18
Attending: NURSE PRACTITIONER | Admitting: NURSE PRACTITIONER
Payer: MEDICARE

## 2018-05-18 DIAGNOSIS — E07.9 SWELLING OF THYROID GLAND: ICD-10-CM

## 2018-05-18 DIAGNOSIS — E89.0 POSTABLATIVE HYPOTHYROIDISM: ICD-10-CM

## 2018-05-18 PROCEDURE — 76536 US EXAM OF HEAD AND NECK: CPT

## 2018-06-05 ENCOUNTER — OFFICE VISIT (OUTPATIENT)
Dept: ORTHOPEDICS | Facility: CLINIC | Age: 68
End: 2018-06-05
Payer: COMMERCIAL

## 2018-06-05 ENCOUNTER — RADIANT APPOINTMENT (OUTPATIENT)
Dept: GENERAL RADIOLOGY | Facility: CLINIC | Age: 68
End: 2018-06-05
Attending: FAMILY MEDICINE
Payer: COMMERCIAL

## 2018-06-05 VITALS
SYSTOLIC BLOOD PRESSURE: 126 MMHG | WEIGHT: 225 LBS | HEIGHT: 69 IN | BODY MASS INDEX: 33.33 KG/M2 | DIASTOLIC BLOOD PRESSURE: 82 MMHG

## 2018-06-05 DIAGNOSIS — G89.29 CHRONIC PAIN OF LEFT KNEE: ICD-10-CM

## 2018-06-05 DIAGNOSIS — M25.562 CHRONIC PAIN OF LEFT KNEE: Primary | ICD-10-CM

## 2018-06-05 DIAGNOSIS — M25.562 CHRONIC PAIN OF LEFT KNEE: ICD-10-CM

## 2018-06-05 DIAGNOSIS — M17.0 BILATERAL PRIMARY OSTEOARTHRITIS OF KNEE: ICD-10-CM

## 2018-06-05 DIAGNOSIS — G89.29 CHRONIC PAIN OF LEFT KNEE: Primary | ICD-10-CM

## 2018-06-05 PROCEDURE — 73562 X-RAY EXAM OF KNEE 3: CPT

## 2018-06-05 PROCEDURE — 99203 OFFICE O/P NEW LOW 30 MIN: CPT | Performed by: FAMILY MEDICINE

## 2018-06-05 NOTE — LETTER
2018         RE: Radha Corona  [address] [place] [id number]        Dear Colleague,    Thank you for referring your patient, Radha Corona, to the [facility] [place]. Please see a copy of my visit note below.    Radha Corona  :  1950  DOS: 2018  MRN: [id number]    Sports Medicine Clinic Visit    PCP: Myrna Staples    Radha Corona is a 68 year old female who is seen in consultation at the request of  Myrna Staples C.N.P. presenting with chronic left knee pain.    Injury: Walking down her outside steps, stumbled, possibly twisting over left knee ~ 6 months ago (2017).  Pain located over left deep medial knee, nonradiating.  Additional Features:  Positive: swelling and weakness.  Symptoms are better with Rest.  Symptoms are worse with: walking, prolonged standing, descending stairs, going from sit to stand.  Other evaluation and/or treatments so far consists of: Tylenol, Rest and PCP consult.  Recent imaging completed: No recent imaging completed.  Prior History of related problems: none    Social History: retired     Review of Systems  Musculoskeletal: as above  Remainder of review of systems is negative including constitutional, CV, pulmonary, GI, Skin and Neurologic except as noted in HPI or medical history.    Past Medical History:   Diagnosis Date     Abscess of intestine 8/3/07    ptl colectomy     Basal cell carcinoma      Displacement of lumbar intervertebral disc without myelopathy 8/3/07     GERD (gastroesophageal reflux disease)      Malignant neoplasm of breast (female), unspecified site 8/3/07     Migraine, unspecified, without mention of intractable migraine without mention of status migrainosus      Other and unspecified hyperlipidemia 8/3/07     Past Surgical History:   Procedure Laterality Date     ARTHROSCOPY KNEE RT/LT  2005     BREAST LUMPECTOMY, RT/LT  06    Right Breast Lumpectomy     C ORAL  "SURGERY PROCEDURE      wisdom teeth extraction     COLECTOMY      Reversal of Colostomy 8/05     D & C       HC SACROPLASTY  2001    Hx of Spine Surgery L4-5     HYSTERECTOMY, [facility]  5/2005       Objective  /82  Ht 5' 9\" (1.753 m)  Wt 225 lb (102.1 kg)  BMI 33.23 kg/m2    General: healthy, alert and in no distress    HEENT: no scleral icterus or conjunctival erythema   Skin: no suspicious lesions or rash. No jaundice.   CV: regular rhythm by palpation, 2+ distal pulses, no pedal edema    Resp: normal respiratory effort without conversational dyspnea   Psych: normal mood and affect    Gait: mildly antalgic, appropriate coordination and balance   Neuro: normal light touch sensory exam of the extremities. Motor strength as noted below     Right Knee exam    ROM:        Flexion 110 degrees L, 120 on R       Extension -2 degrees       Range of motion limited by pain on L medially, mildly on R anteriorly    Inspection:       no visible ecchymosis        effusion noted small on L    Skin:       no visible deformities       well perfused       capillary refill brisk    Patellar Motion:        Crepitus noted in the patellofemoral joint    Tender:        medial patellar border mild       lateral patellar border R>L       medial joint line L>R    Non Tender:         remainder of knee area        along MCL        distal IT Band        infrapatellar tendon        tibial tubercle       pes anserine bursa    Special Tests:        neg (-) varus at 0 deg and 30 deg       neg (-) valgus at 0 deg and 30 deg    Evaluation of ipsilateral kinetic chain       decreased strength with resisted abduction of the L>right hip       decreased strength with resisted extension of the L>right hip       B/l decreased quad tone and core deconditioning    Radiology  XR images independently visualized and reviewed with patient today in clinic  B/l medial compartment significant narrowing, moderate R PF DJD, milder on the right    Assessment:  1. " Chronic pain of left knee    2. Bilateral primary osteoarthritis of knee        Plan:  Discussed the assessment with the patient.  Follow up: prn based on progress  Low impact activity options and PT options reviewed in detail today  Acute on chronic L knee OA flare, b/l DJD apparent on XR  RICE and sleeve options reviewed for activity  Reviewed wt loss, activity modification and progressive increase in activity as tolerated and guided by pain  Reviewed options for potential steroid vs viscosupplementation injections and the possibility for future orthopedic referral prn  Reviewed safe and appropriate OTC medication choices, try tylenol first  Up to 3000mg daily of tylenol is generally safe, NSAID dosing and duration limitations reviewed  Discussed nature of degenerative arthrosis of the knee.   Discussed symptom treatment with ice or heat, topical treatments, and rest if needed.   We discussed modified progressive pain-free activity as tolerated  Home handouts provided and supportive care reviewed  All questions were answered today  Contact us with additional questions or concerns  Signs and sx of concern reviewed    Thanks very much for sending this nice lady to us, I will keep you updated with her progress      Skyler Schaefer DO, ART  Primary Care Sports Medicine  [facility]                 Disclaimer: This note consists of symbols derived from keyboarding, dictation and/or voice recognition software. As a result, there may be errors in the script that have gone undetected. Please consider this when interpreting information found in this chart.    Again, thank you for allowing me to participate in the care of your patient.        Sincerely,        Skyler Schaefer DO

## 2018-06-05 NOTE — MR AVS SNAPSHOT
"              After Visit Summary   6/5/2018    Radha Corona    MRN: [id number]           Patient Information     Date Of Birth          1950        Visit Information        Provider Department      6/5/2018 4:20 PM Skyler Schaefer DO [facility] [place]        Today's Diagnoses     Chronic pain of left knee    -  1    Bilateral primary osteoarthritis of knee           Follow-ups after your visit        Additional Services     PHYSICAL THERAPY REFERRAL       *This therapy referral will be filtered to a centralized scheduling office at [facility] and the patient will receive a call to schedule an appointment at a [place] location most convenient for them. *     [facility] provides Physical Therapy evaluation and treatment and many specialty services across the [place] system.  If requesting a specialty program, please choose from the list below.    If you have not heard from the scheduling office within 2 business days, please call [phone] for all locations, with the exception of [place], please call [phone] and [facility], please call [phone]  Treatment: Evaluation & Treatment  Special Instructions/Modalities: work on knee and core stabilization  Special Programs: None    Please be aware that coverage of these services is subject to the terms and limitations of your health insurance plan.  Call member services at your health plan with any benefit or coverage questions.      **Note to Provider:  If you are referring outside of [place] for the therapy appointment, please list the name of the location in the \"special instructions\" above, print the referral and give to the patient to schedule the appointment.                  Your next 10 appointments already scheduled     Nov 19, 2018  9:30 AM CST   Return Visit with Edy Dove MD   [facility]    [address] [place] " "[place]  [facility] [address]   [phone]              Who to contact     If you have questions or need follow up information about today's clinic visit or your schedule please contact [facility] [place] directly at [phone].  Normal or non-critical lab and imaging results will be communicated to you by MyChart, letter or phone within 4 business days after the clinic has received the results. If you do not hear from us within 7 days, please contact the clinic through MyChart or phone. If you have a critical or abnormal lab result, we will notify you by phone as soon as possible.  Submit refill requests through [employer] or call your pharmacy and they will forward the refill request to us. Please allow 3 business days for your refill to be completed.          Additional Information About Your Visit        MyChart Information     [employer] lets you send messages to your doctor, view your test results, renew your prescriptions, schedule appointments and more. To sign up, go to [email or web address] . Click on \"Log in\" on the left side of the screen, which will take you to the Welcome page. Then click on \"Sign up Now\" on the right side of the page.     You will be asked to enter the access code listed below, as well as some personal information. Please follow the directions to create your username and password.     Your access code is: [email or web address]  Expires: 8/15/2018  9:42 AM     Your access code will  in 90 days. If you need help or a new code, please call your [facility] clinic or [phone].        Care EveryWhere ID     This is your Care EveryWhere ID. This could be used by other organizations to access your [facility] medical records  [id number]        Your Vitals Were     Height BMI (Body Mass Index)                5' 9\" (1.753 m) 33.23 kg/m2           Blood Pressure from Last 3 Encounters:   18 126/82   18 115/74   17 103/69    Weight from Last 3 " Encounters:   06/05/18 225 lb (102.1 kg)   05/17/18 227 lb (103 kg)   12/19/17 230 lb (104.3 kg)              We Performed the Following     PHYSICAL THERAPY REFERRAL        Primary Care Provider Office Phone # Fax #    JANICE Nichole [phone] [address] [facility] [address]        Equal Access to Services     STEPHON DAVIS : Hadii aad ku hadasho Soomaali, waaxda luqadaha, qaybta kaalmada adeegyada, waxay idiin hayaan adeeg kharash la'benjamin . So [facility] [phone].    ATENCIÓN: Si tejas robertson, tiene a howard disposición servicios gratuitos de asistencia lingüística. Llame al [phone].    We comply with applicable federal civil rights laws and [place] laws. We do not discriminate on the basis of race, color, national origin, age, disability, sex, sexual orientation, or gender identity.            Thank you!     Thank you for choosing [facility]  for your care. Our goal is always to provide you with excellent care. Hearing back from our patients is one way we can continue to improve our services. Please take a few minutes to complete the written survey that you may receive in the mail after your visit with us. Thank you!             Your Updated Medication List - Protect others around you: Learn how to safely use, store and throw away your medicines at www.disposemymeds.org.          This list is accurate as of 6/5/18 11:59 PM.  Always use your most recent med list.                   Brand Name Dispense Instructions for use Diagnosis    acetaminophen-codeine 300-30 MG per tablet    TYLENOL WITH CODEINE #3    30 tablet    Take 1-2 tablets by mouth daily as needed for pain    Migraine without status migrainosus, not intractable, unspecified migraine type       atorvastatin 20 MG tablet    LIPITOR    90 tablet    Take 1 tablet (20 mg) by mouth daily    Hyperlipidemia LDL goal <70       furosemide 40 MG tablet    LASIX    90 tablet    Take 1 tablet  (40 mg) by mouth daily as needed    Localized edema       levothyroxine 75 MCG tablet    SYNTHROID/LEVOTHROID    90 tablet    Take 1 tablet (75 mcg) by mouth daily    Hypothyroidism, unspecified type       meclizine 25 MG tablet    ANTIVERT    30 tablet    Take 1 tablet (25 mg) by mouth every 6 hours as needed for dizziness    H/O motion sickness       metoprolol tartrate 50 MG tablet    LOPRESSOR    180 tablet    Take 1 tablet (50 mg) by mouth 2 times daily    Paroxysmal atrial fibrillation (H)       * order for DME     1 each    Equipment being ordered: specialized orthotics for feet    Foot pain       * order for DME     2 Units    Equipment being ordered: wrist splints for CTS    CTS (carpal tunnel syndrome), unspecified laterality       * order for DME     1 Box    Equipment being ordered: insole Arch supports    Foot drop, right       oxybutynin 10 MG 24 hr tablet    DITROPAN XL    90 tablet    Take 1 tablet (10 mg) by mouth daily    Overactive bladder       warfarin 2.5 MG tablet    JANTOVEN    176 tablet    As directed by Anticoagulation Clinic, current dose 2.5 mg Mon, Wed, Fri, 5 mg rest of week    Chronic atrial fibrillation (H)       * Notice:  This list has 3 medication(s) that are the same as other medications prescribed for you. Read the directions carefully, and ask your doctor or other care provider to review them with you.

## 2018-07-18 ENCOUNTER — ANTICOAGULATION THERAPY VISIT (OUTPATIENT)
Dept: ANTICOAGULATION | Facility: CLINIC | Age: 68
End: 2018-07-18
Payer: COMMERCIAL

## 2018-07-18 DIAGNOSIS — Z79.01 LONG TERM CURRENT USE OF ANTICOAGULANT THERAPY: ICD-10-CM

## 2018-07-18 DIAGNOSIS — I63.9 CEREBRAL INFARCTION (H): ICD-10-CM

## 2018-07-18 DIAGNOSIS — I48.91 ATRIAL FIBRILLATION (H): ICD-10-CM

## 2018-07-18 LAB — INR POINT OF CARE: 2.5 (ref 0.86–1.14)

## 2018-07-18 PROCEDURE — 85610 PROTHROMBIN TIME: CPT | Mod: QW

## 2018-07-18 PROCEDURE — 99207 ZZC NO CHARGE NURSE ONLY: CPT

## 2018-07-18 PROCEDURE — 36416 COLLJ CAPILLARY BLOOD SPEC: CPT

## 2018-07-18 NOTE — MR AVS SNAPSHOT
Radha AVILA Cj   7/18/2018 11:30 AM   Anticoagulation Therapy Visit    Description:  68 year old female   Provider:  [facility]   Department:  [facility]           INR as of 7/18/2018     Today's INR 2.5      Anticoagulation Summary as of 7/18/2018     INR goal 2.0-3.0   Today's INR 2.5   Full warfarin instructions 2.5 mg on Mon, Wed, Fri; 5 mg all other days   Next INR check 8/29/2018    Indications   Cerebral infarction (H) [I63.9]  Atrial fibrillation [427.31] [I48.91]  Long term current use of anticoagulant therapy [Z79.01]         Contact Numbers     Please call [phone] with any problems or questions regarding your therapy.    If you need to cancel and/or reschedule your appointment please call one of the following numbers:  [facility] - [phone]  [place] - [phone]  [place] - [phone]  [place] - [phone]  [place] - [phone]            July 2018 Details    Sun Mon Tue Wed Thu Fri Sat     1               2               3               4               5               6               7                 8               9               10               11               12               13               14                 15               16               17               18      2.5 mg   See details      19      5 mg         20      2.5 mg         21      5 mg           22      5 mg         23      2.5 mg         24      5 mg         25      2.5 mg         26      5 mg         27      2.5 mg         28      5 mg           29      5 mg         30      2.5 mg         31      5 mg              Date Details   07/18 This INR check               How to take your warfarin dose     To take:  2.5 mg Take 1 of the 2.5 mg tablets.    To take:  5 mg Take 2 of the 2.5 mg tablets.           August 2018 Details    Sun Mon Tue Wed Thu Fri Sat        1      2.5 mg         2      5 mg         3      2.5 mg         4      5 mg           5      5 mg         6      2.5 mg         7      5 mg          8      2.5 mg         9      5 mg         10      2.5 mg         11      5 mg           12      5 mg         13      2.5 mg         14      5 mg         15      2.5 mg         16      5 mg         17      2.5 mg         18      5 mg           19      5 mg         20      2.5 mg         21      5 mg         22      2.5 mg         23      5 mg         24      2.5 mg         25      5 mg           26      5 mg         27      2.5 mg         28      5 mg         29            30               31                 Date Details   No additional details    Date of next INR:  8/29/2018         How to take your warfarin dose     To take:  2.5 mg Take 1 of the 2.5 mg tablets.    To take:  5 mg Take 2 of the 2.5 mg tablets.

## 2018-07-18 NOTE — PROGRESS NOTES
ANTICOAGULATION FOLLOW-UP CLINIC VISIT    Patient Name:  Radha Corona  Date:  7/18/2018  Contact Type:  Face to Face    SUBJECTIVE:     Patient Findings     Positives No Problem Findings    Comments No changes in medications, diet, or activity noted. Took warfarin as instructed, denies any missed doses.            OBJECTIVE    INR Protime   Date Value Ref Range Status   07/18/2018 2.5 (A) 0.86 - 1.14 Final       ASSESSMENT / PLAN  INR assessment THER    Recheck INR In: 6 WEEKS    INR Location Clinic      Anticoagulation Summary as of 7/18/2018     INR goal 2.0-3.0   Today's INR 2.5   Warfarin maintenance plan 2.5 mg (2.5 mg x 1) on Mon, Wed, Fri; 5 mg (2.5 mg x 2) all other days   Full warfarin instructions 2.5 mg on Mon, Wed, Fri; 5 mg all other days   Weekly warfarin total 27.5 mg   No change documented Yeni Payan RN   Plan last modified Melissa Mcmillan RN (3/24/2015)   Next INR check 8/29/2018   Priority INR   Target end date Indefinite    Indications   Cerebral infarction (H) [I63.9]  Atrial fibrillation [427.31] [I48.91]  Long term current use of anticoagulant therapy [Z79.01]         Anticoagulation Episode Summary     INR check location     Preferred lab     Send INR reminders to [facility]    Comments * 2.5mg tablets. 12/22 - needs snowbird letter      Anticoagulation Care Providers     Provider Role Specialty Phone number    JhoanMyrna, APRN CNP Responsible Nurse Practitioner [phone]            See the Encounter Report to view Anticoagulation Flowsheet and Dosing Calendar (Go to Encounters tab in chart review, and find the Anticoagulation Therapy Visit)        Yeni Payan RN, CACP

## 2018-09-12 ENCOUNTER — TRANSFERRED RECORDS (OUTPATIENT)
Dept: HEALTH INFORMATION MANAGEMENT | Facility: CLINIC | Age: 68
End: 2018-09-12

## 2018-09-18 ENCOUNTER — ANTICOAGULATION THERAPY VISIT (OUTPATIENT)
Dept: ANTICOAGULATION | Facility: CLINIC | Age: 68
End: 2018-09-18
Payer: COMMERCIAL

## 2018-09-18 DIAGNOSIS — Z79.01 LONG TERM CURRENT USE OF ANTICOAGULANT THERAPY: ICD-10-CM

## 2018-09-18 LAB — INR POINT OF CARE: 2.3 (ref 0.86–1.14)

## 2018-09-18 PROCEDURE — 85610 PROTHROMBIN TIME: CPT | Mod: QW

## 2018-09-18 PROCEDURE — 36416 COLLJ CAPILLARY BLOOD SPEC: CPT

## 2018-09-18 PROCEDURE — 99207 ZZC NO CHARGE NURSE ONLY: CPT

## 2018-09-18 NOTE — PROGRESS NOTES
ANTICOAGULATION FOLLOW-UP CLINIC VISIT    Patient Name:  Radha Corona  Date:  9/18/2018  Contact Type:  Face to Face    SUBJECTIVE:     Patient Findings     Positives No Problem Findings    Comments No changes in medications, diet, or activity. No concerns with bleeding or bruising. Took warfarin as prescribed.   Continue maintenance warfarin dose. Will recheck INR in 6 weeks.  Patient verbalizes understanding and agrees with plan. No further questions at this time.              OBJECTIVE    INR Protime   Date Value Ref Range Status   09/18/2018 2.3 (A) 0.86 - 1.14 Final       ASSESSMENT / PLAN  INR assessment THER    Recheck INR In: 6 WEEKS    INR Location Clinic      Anticoagulation Summary as of 9/18/2018     INR goal 2.0-3.0   Today's INR 2.3   Warfarin maintenance plan 2.5 mg (2.5 mg x 1) on Mon, Wed, Fri; 5 mg (2.5 mg x 2) all other days   Full warfarin instructions 2.5 mg on Mon, Wed, Fri; 5 mg all other days   Weekly warfarin total 27.5 mg   No change documented Bhumi Alves RN   Plan last modified Melissa Mcmillan, RN (3/24/2015)   Next INR check 10/30/2018   Priority INR   Target end date Indefinite    Indications   Cerebral infarction (H) [I63.9]  Atrial fibrillation [427.31] [I48.91]  Long term current use of anticoagulant therapy [Z79.01]         Anticoagulation Episode Summary     INR check location     Preferred lab     Send INR reminders to [facility]    Comments * 2.5mg tablets      Anticoagulation Care Providers     Provider Role Specialty Phone number    Myrna Staples, APRN CNP Responsible Nurse Practitioner [phone]            See the Encounter Report to view Anticoagulation Flowsheet and Dosing Calendar (Go to Encounters tab in chart review, and find the Anticoagulation Therapy Visit)        Bhumi Alves, RN

## 2018-09-18 NOTE — MR AVS SNAPSHOT
Radha AVILA Cj   9/18/2018 11:15 AM   Anticoagulation Therapy Visit    Description:  68 year old female   Provider:  [facility]   Department:  [facility]           INR as of 9/18/2018     Today's INR 2.3      Anticoagulation Summary as of 9/18/2018     INR goal 2.0-3.0   Today's INR 2.3   Full warfarin instructions 2.5 mg on Mon, Wed, Fri; 5 mg all other days   Next INR check 10/30/2018    Indications   Cerebral infarction (H) [I63.9]  Atrial fibrillation [427.31] [I48.91]  Long term current use of anticoagulant therapy [Z79.01]         Description     Continue same dose, recheck INR in 6 weeks.      Contact Numbers     Please call [phone] with any problems or questions regarding your therapy.    If you need to cancel and/or reschedule your appointment please call one of the following numbers:  [facility] [phone]  [place] - [phone]  [place] - [phone]  [facility] [phone]  [place] - [phone]            September 2018 Details    Sun Mon Tue Wed Thu Fri Sat           1                 2               3               4               5               6               7               8                 9               10               11               12               13               14               15                 16               17               18      5 mg   See details      19      2.5 mg         20      5 mg         21      2.5 mg         22      5 mg           23      5 mg         24      2.5 mg         25      5 mg         26      2.5 mg         27      5 mg         28      2.5 mg         29      5 mg           30      5 mg                Date Details   09/18 This INR check               How to take your warfarin dose     To take:  2.5 mg Take 1 of the 2.5 mg tablets.    To take:  5 mg Take 2 of the 2.5 mg tablets.           October 2018 Details    Sun Mon Tue Wed Thu Fri Sat      1      2.5 mg         2      5 mg         3      2.5 mg         4      5 mg         5       2.5 mg         6      5 mg           7      5 mg         8      2.5 mg         9      5 mg         10      2.5 mg         11      5 mg         12      2.5 mg         13      5 mg           14      5 mg         15      2.5 mg         16      5 mg         17      2.5 mg         18      5 mg         19      2.5 mg         20      5 mg           21      5 mg         22      2.5 mg         23      5 mg         24      2.5 mg         25      5 mg         26      2.5 mg         27      5 mg           28      5 mg         29      2.5 mg         30            31                   Date Details   No additional details    Date of next INR:  10/30/2018         How to take your warfarin dose     To take:  2.5 mg Take 1 of the 2.5 mg tablets.    To take:  5 mg Take 2 of the 2.5 mg tablets.

## 2018-09-19 ENCOUNTER — OFFICE VISIT (OUTPATIENT)
Dept: FAMILY MEDICINE | Facility: CLINIC | Age: 68
End: 2018-09-19
Payer: COMMERCIAL

## 2018-09-19 VITALS
TEMPERATURE: 96.3 F | OXYGEN SATURATION: 94 % | BODY MASS INDEX: 34.07 KG/M2 | SYSTOLIC BLOOD PRESSURE: 118 MMHG | HEART RATE: 67 BPM | HEIGHT: 69 IN | WEIGHT: 230 LBS | DIASTOLIC BLOOD PRESSURE: 80 MMHG

## 2018-09-19 DIAGNOSIS — N32.81 OVERACTIVE BLADDER: ICD-10-CM

## 2018-09-19 DIAGNOSIS — G89.29 CHRONIC PAIN OF LEFT KNEE: ICD-10-CM

## 2018-09-19 DIAGNOSIS — G43.909 MIGRAINE WITHOUT STATUS MIGRAINOSUS, NOT INTRACTABLE, UNSPECIFIED MIGRAINE TYPE: Primary | ICD-10-CM

## 2018-09-19 DIAGNOSIS — I48.20 CHRONIC ATRIAL FIBRILLATION (H): ICD-10-CM

## 2018-09-19 DIAGNOSIS — M25.562 CHRONIC PAIN OF LEFT KNEE: ICD-10-CM

## 2018-09-19 DIAGNOSIS — R60.0 LOCALIZED EDEMA: ICD-10-CM

## 2018-09-19 DIAGNOSIS — E03.9 HYPOTHYROIDISM, UNSPECIFIED TYPE: ICD-10-CM

## 2018-09-19 DIAGNOSIS — F40.240 CLAUSTROPHOBIA: ICD-10-CM

## 2018-09-19 DIAGNOSIS — E78.5 HYPERLIPIDEMIA LDL GOAL <70: ICD-10-CM

## 2018-09-19 DIAGNOSIS — Z23 NEED FOR PROPHYLACTIC VACCINATION AND INOCULATION AGAINST INFLUENZA: ICD-10-CM

## 2018-09-19 DIAGNOSIS — I48.0 PAROXYSMAL ATRIAL FIBRILLATION (H): ICD-10-CM

## 2018-09-19 PROCEDURE — 99214 OFFICE O/P EST MOD 30 MIN: CPT | Mod: 25 | Performed by: INTERNAL MEDICINE

## 2018-09-19 PROCEDURE — G0008 ADMIN INFLUENZA VIRUS VAC: HCPCS | Performed by: INTERNAL MEDICINE

## 2018-09-19 PROCEDURE — 90662 IIV NO PRSV INCREASED AG IM: CPT | Performed by: INTERNAL MEDICINE

## 2018-09-19 RX ORDER — LORAZEPAM 1 MG/1
1 TABLET ORAL PRN
Qty: 2 TABLET | Refills: 0 | Status: SHIPPED | OUTPATIENT
Start: 2018-09-19 | End: 2019-08-15

## 2018-09-19 RX ORDER — ATORVASTATIN CALCIUM 20 MG/1
20 TABLET, FILM COATED ORAL DAILY
Qty: 90 TABLET | Refills: 3 | Status: SHIPPED | OUTPATIENT
Start: 2018-09-19 | End: 2019-08-15

## 2018-09-19 RX ORDER — LEVOTHYROXINE SODIUM 75 UG/1
75 TABLET ORAL DAILY
Qty: 90 TABLET | Refills: 3 | Status: SHIPPED | OUTPATIENT
Start: 2018-09-19 | End: 2019-08-15

## 2018-09-19 RX ORDER — METOPROLOL TARTRATE 50 MG
50 TABLET ORAL 2 TIMES DAILY
Qty: 180 TABLET | Refills: 3 | Status: SHIPPED | OUTPATIENT
Start: 2018-09-19 | End: 2019-08-15

## 2018-09-19 RX ORDER — OXYBUTYNIN CHLORIDE 10 MG/1
10 TABLET, EXTENDED RELEASE ORAL DAILY
Qty: 90 TABLET | Refills: 3 | Status: SHIPPED | OUTPATIENT
Start: 2018-09-19 | End: 2019-08-15

## 2018-09-19 RX ORDER — WARFARIN SODIUM 2.5 MG/1
TABLET ORAL
Qty: 176 TABLET | Refills: 3 | Status: SHIPPED | OUTPATIENT
Start: 2018-09-19 | End: 2019-10-16

## 2018-09-19 RX ORDER — FUROSEMIDE 40 MG
40 TABLET ORAL DAILY PRN
Qty: 90 TABLET | Refills: 3 | Status: SHIPPED | OUTPATIENT
Start: 2018-09-19 | End: 2019-08-15

## 2018-09-19 RX ORDER — ACETAMINOPHEN AND CODEINE PHOSPHATE 300; 30 MG/1; MG/1
1-2 TABLET ORAL DAILY PRN
Qty: 30 TABLET | Refills: 0 | Status: SHIPPED | OUTPATIENT
Start: 2018-09-19 | End: 2019-08-15

## 2018-09-19 NOTE — PROGRESS NOTES
SUBJECTIVE:   Radha Corona is a 68 year old female who presents to clinic today for the following health issues:      Medication Followup of Tylenol with Codeine    Taking Medication as prescribed: yes    Side Effects:  None    Medication Helping Symptoms:  Yes    Uses for migraines once every 2-3 months       Medication Followup of Lipitor    Taking Medication as prescribed: yes    Side Effects:  None    Medication Helping Symptoms:  yes       Medication Followup of levothyroxine    Taking Medication as prescribed: yes    Side Effects:  None    Medication Helping Symptoms:  Yes    Has not had any palpitations, temp intolerance, diarrhea or constipation, weight changes      Medication Followup of oxybutynin    Taking Medication as prescribed: ran out awhile ago and symptoms have worsened    Side Effects:  None    Medication Helping Symptoms:  Yes- when taking      Takes furosemide as needed a couple of times per week for feet swelling when she eats more salt.     Bernie injured her left knee in December 2017 and saw sports med in June for this.  She was instructed to use a knee brace, but pain is continuing medially and she would like to get an MRI.  She did have an x-ray that showed medial joint arthritis, but she also wonders about soft tissue injury.     Heading to [place] in late October until late April.      Problem list and histories reviewed & adjusted, as indicated.  Additional history: as documented    Patient Active Problem List   Diagnosis     Lumbar Radiculopathy- s/p fusion at L4-5.     CTS (carpal tunnel syndrome)     Neuropathy     Cerebral infarction (H)     Osteoarthritis     Arthropathy     Grave's disease     Edema leg     Postablative hypothyroidism     Advanced directives, counseling/discussion     Hyperlipidemia LDL goal <100     Breast cancer (H)     Chronic atrial fibrillation (H)     Generalized osteoarthrosis, unspecified site     HX: breast cancer     Seasonal allergies     Atrial  fibrillation [427.31]     Long term current use of anticoagulant therapy     Migraine without status migrainosus, not intractable, unspecified migraine type     Past Surgical History:   Procedure Laterality Date     ARTHROSCOPY KNEE RT/LT  4/2005     BREAST LUMPECTOMY, RT/LT  2/7/06    Right Breast Lumpectomy     C ORAL SURGERY PROCEDURE      wisdom teeth extraction     COLECTOMY      Reversal of Colostomy 8/05     D & C       HC SACROPLASTY  2001    Hx of Spine Surgery L4-5     HYSTERECTOMY, DIOMEDES  5/2005       Social History   Substance Use Topics     Smoking status: Never Smoker     Smokeless tobacco: Never Used     Alcohol use Yes      Comment: wine occasionally     Family History   Problem Relation Age of Onset     Breast Cancer Mother      Diabetes Father      Hypertension Father      Lipids Father      Neurologic Disorder Daughter      Migraines     Neurologic Disorder Brother      Cancer No family hx of      no hx skin cancer         Current Outpatient Prescriptions   Medication Sig Dispense Refill     acetaminophen-codeine (TYLENOL WITH CODEINE #3) 300-30 MG per tablet Take 1-2 tablets by mouth daily as needed for pain 30 tablet 0     atorvastatin (LIPITOR) 20 MG tablet Take 1 tablet (20 mg) by mouth daily 90 tablet 3     furosemide (LASIX) 40 MG tablet Take 1 tablet (40 mg) by mouth daily as needed (edema) 90 tablet 3     levothyroxine (SYNTHROID/LEVOTHROID) 75 MCG tablet Take 1 tablet (75 mcg) by mouth daily 90 tablet 3     LORazepam (ATIVAN) 1 MG tablet Take 1 tablet (1 mg) by mouth as needed for anxiety Take 30-60 minutes prior to MRI.  Use second dose if needed.  Do not operate a vehicle after taking this medication 2 tablet 0     metoprolol tartrate (LOPRESSOR) 50 MG tablet Take 1 tablet (50 mg) by mouth 2 times daily 180 tablet 3     oxybutynin (DITROPAN XL) 10 MG 24 hr tablet Take 1 tablet (10 mg) by mouth daily 90 tablet 3     warfarin (JANTOVEN) 2.5 MG tablet As directed by Anticoagulation Clinic,  "current dose 2.5 mg Mon, Wed, Fri, 5 mg rest of week 176 tablet 3     ORDER FOR DME Equipment being ordered: insole Arch supports 1 Box 0     ORDER FOR DME Equipment being ordered: wrist splints for CTS 2 Units 0     ORDER FOR DME Equipment being ordered: specialized orthotics for feet 1 each 0     [DISCONTINUED] atorvastatin (LIPITOR) 20 MG tablet Take 1 tablet (20 mg) by mouth daily 90 tablet 3     [DISCONTINUED] furosemide (LASIX) 40 MG tablet Take 1 tablet (40 mg) by mouth daily as needed 90 tablet 3     [DISCONTINUED] levothyroxine (SYNTHROID/LEVOTHROID) 75 MCG tablet Take 1 tablet (75 mcg) by mouth daily 90 tablet 3     [DISCONTINUED] metoprolol (LOPRESSOR) 50 MG tablet Take 1 tablet (50 mg) by mouth 2 times daily 180 tablet 3     [DISCONTINUED] warfarin (JANTOVEN) 2.5 MG tablet As directed by Anticoagulation Clinic, current dose 2.5 mg Mon, Wed, Fri, 5 mg rest of week 176 tablet 3     Allergies   Allergen Reactions     Penicillins Hives     Simvastatin Cramps     Tetracycline Hives       Reviewed and updated as needed this visit by clinical staff       Reviewed and updated as needed this visit by Provider         ROS:  Constitutional, endo, cardiovascular,  systems are negative, except as otherwise noted.    OBJECTIVE:     /80  Pulse 67  Temp 96.3  F (35.7  C) (Tympanic)  Ht 5' 9\" (1.753 m)  Wt 230 lb (104.3 kg)  SpO2 94%  BMI 33.97 kg/m2  Body mass index is 33.97 kg/(m^2).  GENERAL: healthy, alert and no distress  NECK: no adenopathy, no asymmetry, masses, or scars and thyroid normal to palpation  RESP: lungs clear to auscultation - no rales, rhonchi or wheezes  CV: regular rate and rhythm, normal S1 S2, no S3 or S4, no murmur, click or rub, no peripheral edema   MSK:  Reports some medial left knee joint pain on palpation, no effusion, redness, laxity noted    Diagnostic Test Results:  none     ASSESSMENT/PLAN:         1. Migraine without status migrainosus, not intractable, unspecified " migraine type    She takes Tylenol 3 as needed for migraines which she reports she gets one every 2-3 months.  Reviewed [place]  in the last fill was in Dec 2017.  One refill provided.    - acetaminophen-codeine (TYLENOL WITH CODEINE #3) 300-30 MG per tablet; Take 1-2 tablets by mouth daily as needed for pain  Dispense: 30 tablet; Refill: 0    2. Localized edema    She takes Lasix only as needed couple times per week, so did not feel the need to check monitoring labs.  Refill provided.    - furosemide (LASIX) 40 MG tablet; Take 1 tablet (40 mg) by mouth daily as needed (edema)  Dispense: 90 tablet; Refill: 3    3. Hypothyroidism, unspecified type    Thyroid test was done in May and was normal, refill provided    - levothyroxine (SYNTHROID/LEVOTHROID) 75 MCG tablet; Take 1 tablet (75 mcg) by mouth daily  Dispense: 90 tablet; Refill: 3    4. Paroxysmal atrial fibrillation (H)    Controlled, refill provided    - metoprolol tartrate (LOPRESSOR) 50 MG tablet; Take 1 tablet (50 mg) by mouth 2 times daily  Dispense: 180 tablet; Refill: 3    5. Overactive bladder    Worse off of medication she would like to resume, refills provided    - oxybutynin (DITROPAN XL) 10 MG 24 hr tablet; Take 1 tablet (10 mg) by mouth daily  Dispense: 90 tablet; Refill: 3    6. Hyperlipidemia LDL goal <70    She will be due for cholesterol December[place] she states she will get the labs done when she returns.    - **ALT FUTURE 2mo; Future  - Lipid panel reflex to direct LDL Fasting; Future  - atorvastatin (LIPITOR) 20 MG tablet; Take 1 tablet (20 mg) by mouth daily  Dispense: 90 tablet; Refill: 3    7. Chronic atrial fibrillation (H)    Refill provided.    - warfarin (JANTOVEN) 2.5 MG tablet; As directed by Anticoagulation Clinic, current dose 2.5 mg Mon, Wed, Fri, 5 mg rest of week  Dispense: 176 tablet; Refill: 3    8. Chronic pain of left knee    Pain seems most likely due to arthritis, however  seems reasonable at check an MRI to look for soft tissue injury as well considering her pain is ongoing.    - MR Knee Left w/o Contrast; Future    9. Claustrophobia    She does report claustrophobia with MRI machines, so provided short prescription for lorazepam to use beforehand.  Advised her to have a  for the procedure.    - LORazepam (ATIVAN) 1 MG tablet; Take 1 tablet (1 mg) by mouth as needed for anxiety Take 30-60 minutes prior to MRI.  Use second dose if needed.  Do not operate a vehicle after taking this medication  Dispense: 2 tablet; Refill: 0    10. Need for prophylactic vaccination and inoculation against influenza    - FLU VACCINE, INCREASED ANTIGEN, PRESV FREE, AGE 65+ [32629]  - Vaccine Administration, Initial [69791]        Rio Palacios MD  [facility]

## 2018-09-19 NOTE — PROGRESS NOTES

## 2018-09-19 NOTE — MR AVS SNAPSHOT
After Visit Summary   9/19/2018    Radha Corona    MRN: [id number]           Patient Information     Date Of Birth          1950        Visit Information        Provider Department      9/19/2018 9:20 AM Rio Palacios MD [facility]        Today's Diagnoses     Chronic pain of left knee    -  1    Migraine without status migrainosus, not intractable, unspecified migraine type        Localized edema        Hypothyroidism, unspecified type        Paroxysmal atrial fibrillation (H)        Overactive bladder        Hyperlipidemia LDL goal <70        Chronic atrial fibrillation (H)        Claustrophobia          Care Instructions    Knee MRI was ordered.  Please call [phone] to schedule.           Follow-ups after your visit        Your next 10 appointments already scheduled     Nov 19, 2018  9:30 AM CST   Return Visit with Edy Dove MD   [facility] ([facility]    [address] [address] [phone]              Future tests that were ordered for you today     Open Future Orders        Priority Expected Expires Ordered    MR Knee Left w/o Contrast Routine  9/19/2019 9/19/2018    **ALT FUTURE 2mo Routine 11/18/2018 1/17/2019 9/19/2018    Lipid panel reflex to direct LDL Fasting Routine 11/18/2018 1/17/2019 9/19/2018            Who to contact     If you have questions or need follow up information about today's clinic visit or your schedule please contact [facility] directly at [phone].  Normal or non-critical lab and imaging results will be communicated to you by MyChart, letter or phone within 4 business days after the clinic has received the results. If you do not hear from us within 7 days, please contact the clinic through MyChart or phone. If you have a critical or abnormal lab result, we will notify you by phone as soon as possible.  Submit refill requests through [employer] or call your  "pharmacy and they will forward the refill request to us. Please allow 3 business days for your refill to be completed.          Additional Information About Your Visit        Care EveryWhere ID     This is your Care EveryWhere ID. This could be used by other organizations to access your [place] medical records  [id number]        Your Vitals Were     Pulse Temperature Height Pulse Oximetry BMI (Body Mass Index)       67 96.3  F (35.7  C) (Tympanic) 5' 9\" (1.753 m) 94% 33.97 kg/m2        Blood Pressure from Last 3 Encounters:   09/19/18 118/80   06/05/18 126/82   05/17/18 115/74    Weight from Last 3 Encounters:   09/19/18 230 lb (104.3 kg)   06/05/18 225 lb (102.1 kg)   05/17/18 227 lb (103 kg)                 Today's Medication Changes          These changes are accurate as of 9/19/18 10:11 AM.  If you have any questions, ask your nurse or doctor.               Start taking these medicines.        Dose/Directions    LORazepam 1 MG tablet   Commonly known as:  ATIVAN   Used for:  Claustrophobia   Started by:  Rio Palacios MD        Dose:  1 mg   Take 1 tablet (1 mg) by mouth as needed for anxiety Take 30-60 minutes prior to MRI.  Use second dose if needed.  Do not operate a vehicle after taking this medication   Quantity:  2 tablet   Refills:  0         These medicines have changed or have updated prescriptions.        Dose/Directions    furosemide 40 MG tablet   Commonly known as:  LASIX   This may have changed:  reasons to take this   Used for:  Localized edema   Changed by:  Rio Palacios MD        Dose:  40 mg   Take 1 tablet (40 mg) by mouth daily as needed (edema)   Quantity:  90 tablet   Refills:  3         Stop taking these medicines if you haven't already. Please contact your care team if you have questions.     meclizine 25 MG tablet   Commonly known as:  ANTIVERT   Stopped by:  Rio Palacios MD                Where to get your medicines      These medications were sent to [id number] [place] " [address], [facility] [address]     Phone:  [phone]     atorvastatin 20 MG tablet    furosemide 40 MG tablet    levothyroxine 75 MCG tablet    metoprolol tartrate 50 MG tablet    oxybutynin 10 MG 24 hr tablet    warfarin 2.5 MG tablet         Some of these will need a paper prescription and others can be bought over the counter.  Ask your nurse if you have questions.     Bring a paper prescription for each of these medications     acetaminophen-codeine 300-30 MG per tablet    LORazepam 1 MG tablet               Information about OPIOIDS     PRESCRIPTION OPIOIDS: WHAT YOU NEED TO KNOW   We gave you an opioid (narcotic) pain medicine. It is important to manage your pain, but opioids are not always the best choice. You should first try all the other options your care team gave you. Take this medicine for as short a time (and as few doses) as possible.    Some activities can increase your pain, such as bandage changes or therapy sessions. It may help to take your pain medicine 30 to 60 minutes before these activities. Reduce your stress by getting enough sleep, working on hobbies you enjoy and practicing relaxation or meditation. Talk to your care team about ways to manage your pain beyond prescription opioids.    These medicines have risks:    DO NOT drive when on new or higher doses of pain medicine. These medicines can affect your alertness and reaction times, and you could be arrested for driving under the influence (DUI). If you need to use opioids long-term, talk to your care team about driving.    DO NOT operate heavy machinery    DO NOT do any other dangerous activities while taking these medicines.    DO NOT drink any alcohol while taking these medicines.     If the opioid prescribed includes acetaminophen, DO NOT take with any other medicines that contain acetaminophen. Read all labels carefully. Look for the word  acetaminophen  or  Tylenol.  Ask  your pharmacist if you have questions or are unsure.    You can get addicted to pain medicines, especially if you have a history of addiction (chemical, alcohol or substance dependence). Talk to your care team about ways to reduce this risk.    All opioids tend to cause constipation. Drink plenty of water and eat foods that have a lot of fiber, such as fruits, vegetables, prune juice, apple juice and high-fiber cereal. Take a laxative (Miralax, milk of magnesia, Colace, Senna) if you don t move your bowels at least every other day. Other side effects include upset stomach, sleepiness, dizziness, throwing up, tolerance (needing more of the medicine to have the same effect), physical dependence and slowed breathing.    Store your pills in a secure place, locked if possible. We will not replace any lost or stolen medicine. If you don t finish your medicine, please throw away (dispose) as directed by your pharmacist. The [place] Pollution [employer] has more information about safe disposal: [email or web address]/living-[email or web address]/[email or web address]-[email or web address]-medications         Primary Care Provider Office Phone # Fax #    Myrna Staples APRBRAULIO [facility] [phone] [address] [address]        Equal Access to Services     STEPHON DAVIS : Stan etienneo Sohectorali, waaxda luqadaha, qaybta kaalmada adeegyada, yaritza chang. So [facility] [phone].    ATENCIÓN: Si habla español, tiene a howard disposición servicios gratuitos de asistencia lingüística. Llame al [phone].    We comply with applicable federal civil rights laws and [place] laws. We do not discriminate on the basis of race, color, national origin, age, disability, sex, sexual orientation, or gender identity.            Thank you!     Thank you for choosing [facility]  for your care. Our goal is always to provide you with excellent care. Hearing back from our patients is one way we can  continue to improve our services. Please take a few minutes to complete the written survey that you may receive in the mail after your visit with us. Thank you!             Your Updated Medication List - Protect others around you: Learn how to safely use, store and throw away your medicines at www.disposemymeds.org.          This list is accurate as of 9/19/18 10:11 AM.  Always use your most recent med list.                   Brand Name Dispense Instructions for use Diagnosis    acetaminophen-codeine 300-30 MG per tablet    TYLENOL WITH CODEINE #3    30 tablet    Take 1-2 tablets by mouth daily as needed for pain    Migraine without status migrainosus, not intractable, unspecified migraine type       atorvastatin 20 MG tablet    LIPITOR    90 tablet    Take 1 tablet (20 mg) by mouth daily    Hyperlipidemia LDL goal <70       furosemide 40 MG tablet    LASIX    90 tablet    Take 1 tablet (40 mg) by mouth daily as needed (edema)    Localized edema       levothyroxine 75 MCG tablet    SYNTHROID/LEVOTHROID    90 tablet    Take 1 tablet (75 mcg) by mouth daily    Hypothyroidism, unspecified type       LORazepam 1 MG tablet    ATIVAN    2 tablet    Take 1 tablet (1 mg) by mouth as needed for anxiety Take 30-60 minutes prior to MRI.  Use second dose if needed.  Do not operate a vehicle after taking this medication    Claustrophobia       metoprolol tartrate 50 MG tablet    LOPRESSOR    180 tablet    Take 1 tablet (50 mg) by mouth 2 times daily    Paroxysmal atrial fibrillation (H)       * order for DME     1 each    Equipment being ordered: specialized orthotics for feet    Foot pain       * order for DME     2 Units    Equipment being ordered: wrist splints for CTS    CTS (carpal tunnel syndrome), unspecified laterality       * order for DME     1 Box    Equipment being ordered: insole Arch supports    Foot drop, right       oxybutynin 10 MG 24 hr tablet    DITROPAN XL    90 tablet    Take 1 tablet (10 mg) by mouth daily     Overactive bladder       warfarin 2.5 MG tablet    JANTOVEN    176 tablet    As directed by Anticoagulation Clinic, current dose 2.5 mg Mon, Wed, Fri, 5 mg rest of week    Chronic atrial fibrillation (H)       * Notice:  This list has 3 medication(s) that are the same as other medications prescribed for you. Read the directions carefully, and ask your doctor or other care provider to review them with you.

## 2018-09-25 ENCOUNTER — HOSPITAL ENCOUNTER (OUTPATIENT)
Dept: MRI IMAGING | Facility: CLINIC | Age: 68
Discharge: HOME | End: 2018-09-25
Attending: INTERNAL MEDICINE | Admitting: INTERNAL MEDICINE
Payer: MEDICARE

## 2018-09-25 DIAGNOSIS — M25.562 CHRONIC PAIN OF LEFT KNEE: ICD-10-CM

## 2018-09-25 DIAGNOSIS — G89.29 CHRONIC PAIN OF LEFT KNEE: ICD-10-CM

## 2018-09-25 PROCEDURE — 73721 MRI JNT OF LWR EXTRE W/O DYE: CPT | Mod: LT

## 2018-10-03 ENCOUNTER — OFFICE VISIT (OUTPATIENT)
Dept: ORTHOPEDICS | Facility: CLINIC | Age: 68
End: 2018-10-03
Payer: COMMERCIAL

## 2018-10-03 VITALS
HEIGHT: 69 IN | WEIGHT: 230 LBS | SYSTOLIC BLOOD PRESSURE: 120 MMHG | BODY MASS INDEX: 34.07 KG/M2 | DIASTOLIC BLOOD PRESSURE: 82 MMHG

## 2018-10-03 DIAGNOSIS — M17.12 ARTHRITIS OF LEFT KNEE: Primary | ICD-10-CM

## 2018-10-03 PROCEDURE — 99203 OFFICE O/P NEW LOW 30 MIN: CPT | Performed by: PEDIATRICS

## 2018-10-03 NOTE — PROGRESS NOTES
Sports Medicine Clinic Visit    PCP: Myrna Staples    Radha Corona is a 68 year old female who is seen  in consultation at the request of  Rio Palacios M.D. presenting with left knee pain    Injury: she reports chronic knee pain for 1 year. She was seen by Dr Schaefer in June of 2018 and discussed conservative treatments. She continues to have knee pain that hurst with walking, standing and navigating stairs. She reports the pain is medial, no locking.  PCP recently obtained MRI.    Location of Pain: left medial knee  Duration of Pain: 1 year(s)  Rating of Pain at worst: 5/10  Rating of Pain Currently: 0/10  Symptoms are better with: rest  Symptoms are worse with: standing, stairs and walking  Additional Features:   Positive: pain   Negative: swelling, bruising, popping, grinding, catching, locking, instability, paresthesias, numbness and weakness  Other evaluation and/or treatments so far consists of: Ice, Ibuprofen and Rest  Prior History of related problems: she has a history of stroke affecting her left side in 2009. Neuropathy in right leg    Social History: retired    Review of Systems  Skin: no bruising, mild swelling  Musculoskeletal: as above  Neurologic: no numbness, paresthesias  Remainder of review of systems is negative including constitutional, CV, pulmonary, GI, except as noted in HPI or medical history.    Patient's current problem list, past medical and surgical history, and family history were reviewed.    Patient Active Problem List   Diagnosis     Lumbar Radiculopathy- s/p fusion at L4-5.     CTS (carpal tunnel syndrome)     Neuropathy     Cerebral infarction (H)     Osteoarthritis     Arthropathy     Grave's disease     Edema leg     Postablative hypothyroidism     Advanced directives, counseling/discussion     Hyperlipidemia LDL goal <100     Breast cancer (H)     Chronic atrial fibrillation (H)     Generalized osteoarthrosis, unspecified site     HX: breast cancer     Seasonal allergies  "    Atrial fibrillation [427.31]     Long term current use of anticoagulant therapy     Migraine without status migrainosus, not intractable, unspecified migraine type     Past Medical History:   Diagnosis Date     Abscess of intestine 8/3/07    ptl colectomy     Basal cell carcinoma      Displacement of lumbar intervertebral disc without myelopathy 8/3/07     GERD (gastroesophageal reflux disease)      Malignant neoplasm of breast (female), unspecified site 8/3/07     Migraine, unspecified, without mention of intractable migraine without mention of status migrainosus      Other and unspecified hyperlipidemia 8/3/07     Past Surgical History:   Procedure Laterality Date     ARTHROSCOPY KNEE RT/LT  4/2005     BREAST LUMPECTOMY, RT/LT  2/7/06    Right Breast Lumpectomy     C ORAL SURGERY PROCEDURE      wisdom teeth extraction     COLECTOMY      Reversal of Colostomy 8/05     D & C       HC SACROPLASTY  2001    Hx of Spine Surgery L4-5     HYSTERECTOMY, DIOMEDES  5/2005     Family History   Problem Relation Age of Onset     Breast Cancer Mother      Diabetes Father      Hypertension Father      Lipids Father      Neurologic Disorder Daughter      Migraines     Neurologic Disorder Brother      Cancer No family hx of      no hx skin cancer         Objective  /82 (BP Location: Left arm, Patient Position: Chair, Cuff Size: Adult Regular)  Ht 5' 9\" (1.753 m)  Wt 230 lb (104.3 kg)  BMI 33.97 kg/m2    GENERAL APPEARANCE: healthy, alert and no distress   GAIT: antalgic  SKIN: no suspicious lesions or rashes  HEENT: Sclera clear, anicteric  CV: good peripheral pulses  RESP: Breathing not labored  NEURO: Normal strength and tone, mentation intact and speech normal  PSYCH:  mentation appears normal and affect normal/bright    Bilateral Knee exam    Inspection:      mild effusion left       Venous stasis changes bilaterally    Patella:      Mobility -       hypomobile bilateral       Crepitus noted in the patellofemoral joint " bilateral    Tender:      medial joint line left    Non Tender:      remainder of knee area left    Knee ROM:      Flexion 100 degrees left       Extension 10 degrees left    Strength:      5/5 with knee extension left    Special Tests:     neg (-) Kobe left       neg (-) anterior drawer left       neg (-) posterior drawer left       neg (-) varus at 0 deg and 30 deg left       neg (-) valgus at 0 deg and 30 deg left    Gait:      normal    Neurovascular:      2+ peripheral pulses bilaterally and brisk capillary refill       sensation grossly intact    Radiology  I ordered, visualized and reviewed these images with the patient  MR LEFT KNEE WITHOUT CONTRAST  9/25/2018 1:14 PM  HISTORY:  Medial and anterior left knee pain since twisting injury  November 2017.   TECHNIQUE:  Axial and coronal T2 with fat suppression. Coronal T1.  Sagittal dual echo T2.  COMPARISON: None.  FINDINGS:   Medial Meniscus: Marked degeneration, maceration and complex tearing  throughout the posterior horn and mid body. There is peripheral  extrusion of the mid body. The anterior horn is relatively normal. No  displaced meniscal fragments or flaps.     Lateral Meniscus: Probable mild degenerative fraying of the free edge  in the mid body. No evidence for tear.     Anterior Cruciate Ligament: Diffuse thickening and striated  intrasubstance increased signal throughout the anterior cruciate  ligament, most likely representing mucoid cystic degenerative change,  although a partial interstitial tear of uncertain age could give a  similar appearance.   Posterior Cruciate Ligament: Unremarkable.   Medial Collateral Ligament: Unremarkable.  Lateral Collateral Ligament Complex, Popliteus Tendon: The iliotibial  band, fibular collateral ligament, biceps femoris tendon, and  popliteus tendon are unremarkable.  Osseous and Cartilaginous Structures: No acute-appearing bony  abnormality. There is some subchondral cystic change and associated  bone  marrow edema at the medial aspect of the weightbearing medial  tibial plateau. This is likely reactive change related to overlying  articular cartilage and meniscal pathology. There is diffuse grade III  and grade IV chondromalacia in the weightbearing medial compartment.  Mild degenerative marginal bony spurring medially. Articular  cartilages in the weightbearing lateral compartment and patellofemoral  compartment are unremarkable.  Extensor Mechanism: The quadriceps and patellar tendons are  unremarkable. The medial and lateral patellar retinacula are normal.  Joint Space: Very small joint effusion. No definite loose bodies  appreciated.  Additional Findings: 5.8 x 2.6 x 0.9 cm popliteal cyst. No  semimembranosus-tibial collateral ligament or pes anserine bursitis.  IMPRESSION:   1. Marked degeneration, maceration and complex tearing throughout the  posterior horn and mid body of the medial meniscus with peripheral  extrusion of the mid body.  2. Mucoid cystic degenerative change anterior cruciate ligament.  3. Advanced chondromalacia weightbearing medial compartment.  4. Small joint space effusion and popliteal cyst.  LUPE CABRERA MD    LEFT KNEE THREE VIEWS AND RIGHT KNEE TWO VIEWS   6/5/2018 4:43 PM  HISTORY: Chronic left knee pain.  COMPARISON: None.  FINDINGS: No fracture or osseous lesion is demonstrated. There is  prominent joint space loss in the medial compartment of both knees  with moderate right and mild left patellofemoral joint degeneration.  The lateral compartment spaces are well preserved. There is  calcification in the vascular structures. No other soft tissue  pathology is noted.  IMPRESSION: Degenerative changes predominantly involving the medial  compartment of both knees.   BROOK BARRIOS MD    Assessment:  1. Arthritis of left knee        Discussed nature of degenerative arthrosis of the knee. Discussed symptom treatment with over-the-counter medications, ice or heat, topical  treatments, and rest if needed. Discussed use of sleeve or wrap for comfort. Discussed benefits of exercise and weight loss to reduce pressure at the knee. Discussed injection therapy. Also briefly discussed future consideration of referral to orthopedic surgery for further evaluation and discussion of arthroplasty.    Plan:  - Today's Plan of Care:  Rehab: [facility] [phone]    -We also discussed other future treatment options:  Steroid injection of knee or Synvisc injection of knee    Follow Up: as needed    Concerning signs and symptoms were reviewed.  The patient expressed understanding of this management plan and all questions were answered at this time.    Thanks for the opportunity to participate in the care of this patient, I will keep you updated on their progress.    CC: Rio Costa MD [place]  Primary Care Sports Medicine  [facility]

## 2018-10-03 NOTE — MR AVS SNAPSHOT
After Visit Summary   10/3/2018    Radha Corona    MRN: [id number]           Patient Information     Date Of Birth          1950        Visit Information        Provider Department      10/3/2018 2:20 PM Melinda Costa MD [facility]        Today's Diagnoses     Arthritis of left knee    -  1      Care Instructions        Plan:  - Today's Plan of Care:  Rehab: Physical Therapy: [facility] - [phone]    -We also discussed other future treatment options:  Steroid injection of knee or Synvisc injection of knee    Follow Up: as needed    If you have any further questions for your physician or physician s care team you can call [phone] and use option 3 to leave a voice message. Calls received during business hours will be returned same day.              Follow-ups after your visit        Additional Services     PHYSICAL THERAPY REFERRAL       If you have not heard from the scheduling office within 2 business days, please call [phone] for all locations, with the exception of [place], please call [phone] and [place], please call [phone].    Please be aware that coverage of these services is subject to the terms and limitations of your health insurance plan.  Call member services at your health plan with any benefit or coverage questions.                  Your next 10 appointments already scheduled     Nov 19, 2018  9:30 AM CST   Return Visit with Edy Dove MD   [facility]    [address] [id number] [phone]              Future tests that were ordered for you today     Open Future Orders        Priority Expected Expires Ordered    PHYSICAL THERAPY REFERRAL Routine  10/3/2019 10/3/2018            Who to contact     If you have questions or need follow up information about today's clinic visit or your schedule please contact [facility] directly at [phone].  Normal or non-critical lab and imaging results will be communicated to you by MyChart, letter or phone within 4 business days after the clinic has received the results. If you do not hear from us within 7 days, please contact the clinic through MyChart or phone. If you have a critical or abnormal lab result, we will notify you by phone as soon as possible.  Submit refill requests through [employer] or call your pharmacy and they will forward the refill request to us. Please allow 3 business days for your refill to be completed.          Additional Information About Your Visit        Care EveryWhere ID     This is your Care EveryWhere ID. This could be used by other organizations to access your [facility] medical records  [id number]        Your Vitals Were     Height BMI (Body Mass Index)                5' 9\" (1.753 m) 33.97 kg/m2           Blood Pressure from Last 3 Encounters:   10/03/18 120/82   09/19/18 118/80   06/05/18 126/82    Weight from Last 3 Encounters:   10/03/18 230 lb (104.3 kg)   09/19/18 230 lb (104.3 kg)   06/05/18 225 lb (102.1 kg)               Primary Care Provider Office Phone # Fax #    JANICE Nichole [facility] [phone] [address] [address]        Equal Access to Services     STEPHON DAVIS AH: Hadii cole fonseca hadnormao Sonena, waaxda luqadaha, qaybta kaalmada marietta, yaritza junior . So [facility] [phone].    ATENCIÓN: Si habla español, tiene a howard disposición servicios gratuitos de asistencia lingüística. Jessee al [phone].    We comply with applicable federal civil rights laws and [place] laws. We do not discriminate on the basis of race, color, national origin, age, disability, sex, sexual orientation, or gender identity.            Thank you!     Thank you for choosing [facility]  for your care. Our goal is always to provide you with excellent " care. Hearing back from our patients is one way we can continue to improve our services. Please take a few minutes to complete the written survey that you may receive in the mail after your visit with us. Thank you!             Your Updated Medication List - Protect others around you: Learn how to safely use, store and throw away your medicines at www.disposemymeds.org.          This list is accurate as of 10/3/18  3:10 PM.  Always use your most recent med list.                   Brand Name Dispense Instructions for use Diagnosis    acetaminophen-codeine 300-30 MG per tablet    TYLENOL WITH CODEINE #3    30 tablet    Take 1-2 tablets by mouth daily as needed for pain    Migraine without status migrainosus, not intractable, unspecified migraine type       atorvastatin 20 MG tablet    LIPITOR    90 tablet    Take 1 tablet (20 mg) by mouth daily    Hyperlipidemia LDL goal <70       furosemide 40 MG tablet    LASIX    90 tablet    Take 1 tablet (40 mg) by mouth daily as needed (edema)    Localized edema       levothyroxine 75 MCG tablet    SYNTHROID/LEVOTHROID    90 tablet    Take 1 tablet (75 mcg) by mouth daily    Hypothyroidism, unspecified type       LORazepam 1 MG tablet    ATIVAN    2 tablet    Take 1 tablet (1 mg) by mouth as needed for anxiety Take 30-60 minutes prior to MRI.  Use second dose if needed.  Do not operate a vehicle after taking this medication    Claustrophobia       metoprolol tartrate 50 MG tablet    LOPRESSOR    180 tablet    Take 1 tablet (50 mg) by mouth 2 times daily    Paroxysmal atrial fibrillation (H)       * order for DME     1 each    Equipment being ordered: specialized orthotics for feet    Foot pain       * order for DME     2 Units    Equipment being ordered: wrist splints for CTS    CTS (carpal tunnel syndrome), unspecified laterality       * order for DME     1 Box    Equipment being ordered: insole Arch supports    Foot drop, right       oxybutynin 10 MG 24 hr tablet    DITROPAN  XL    90 tablet    Take 1 tablet (10 mg) by mouth daily    Overactive bladder       warfarin 2.5 MG tablet    JANTOVEN    176 tablet    As directed by Anticoagulation Clinic, current dose 2.5 mg Mon, Wed, Fri, 5 mg rest of week    Chronic atrial fibrillation (H)       * Notice:  This list has 3 medication(s) that are the same as other medications prescribed for you. Read the directions carefully, and ask your doctor or other care provider to review them with you.

## 2018-10-03 NOTE — PATIENT INSTRUCTIONS
Plan:  - Today's Plan of Care:  Rehab: [facility] [phone]    -We also discussed other future treatment options:  Steroid injection of knee or Synvisc injection of knee    Follow Up: as needed    If you have any further questions for your physician or physician s care team you can call [phone] and use option 3 to leave a voice message. Calls received during business hours will be returned same day.

## 2018-10-03 NOTE — LETTER
10/3/2018         RE: Radha Corona  [address] [place] [phone]        Dear Colleague,    Thank you for referring your patient, Radha Corona, to the [facility]. Please see a copy of my visit note below.    Sports Medicine Clinic Visit    PCP: Myrna Staples    Radha Corona is a 68 year old female who is seen  in consultation at the request of  Rio Palacios M.D. presenting with left knee pain    Injury: she reports chronic knee pain for 1 year. She was seen by Dr Schaefer in June of 2018 and discussed conservative treatments. She continues to have knee pain that hurst with walking, standing and navigating stairs. She reports the pain is medial, no locking.  PCP recently obtained MRI.    Location of Pain: left medial knee  Duration of Pain: 1 year(s)  Rating of Pain at worst: 5/10  Rating of Pain Currently: 0/10  Symptoms are better with: rest  Symptoms are worse with: standing, stairs and walking  Additional Features:   Positive: pain   Negative: swelling, bruising, popping, grinding, catching, locking, instability, paresthesias, numbness and weakness  Other evaluation and/or treatments so far consists of: Ice, Ibuprofen and Rest  Prior History of related problems: she has a history of stroke affecting her left side in 2009. Neuropathy in right leg    Social History: retired    Review of Systems  Skin: no bruising, mild swelling  Musculoskeletal: as above  Neurologic: no numbness, paresthesias  Remainder of review of systems is negative including constitutional, CV, pulmonary, GI, except as noted in HPI or medical history.    Patient's current problem list, past medical and surgical history, and family history were reviewed.    Patient Active Problem List   Diagnosis     Lumbar Radiculopathy- s/p fusion at L4-5.     CTS (carpal tunnel syndrome)     Neuropathy     Cerebral infarction (H)     Osteoarthritis     Arthropathy     Grave's disease  "    Edema leg     Postablative hypothyroidism     Advanced directives, counseling/discussion     Hyperlipidemia LDL goal <100     Breast cancer (H)     Chronic atrial fibrillation (H)     Generalized osteoarthrosis, unspecified site     HX: breast cancer     Seasonal allergies     Atrial fibrillation [427.31]     Long term current use of anticoagulant therapy     Migraine without status migrainosus, not intractable, unspecified migraine type     Past Medical History:   Diagnosis Date     Abscess of intestine 8/3/07    ptl colectomy     Basal cell carcinoma      Displacement of lumbar intervertebral disc without myelopathy 8/3/07     GERD (gastroesophageal reflux disease)      Malignant neoplasm of breast (female), unspecified site 8/3/07     Migraine, unspecified, without mention of intractable migraine without mention of status migrainosus      Other and unspecified hyperlipidemia 8/3/07     Past Surgical History:   Procedure Laterality Date     ARTHROSCOPY KNEE RT/LT  4/2005     BREAST LUMPECTOMY, RT/LT  2/7/06    Right Breast Lumpectomy     C ORAL SURGERY PROCEDURE      wisdom teeth extraction     COLECTOMY      Reversal of Colostomy 8/05     D & C       HC SACROPLASTY  2001    Hx of Spine Surgery L4-5     HYSTERECTOMY, DIOMEDES  5/2005     Family History   Problem Relation Age of Onset     Breast Cancer Mother      Diabetes Father      Hypertension Father      Lipids Father      Neurologic Disorder Daughter      Migraines     Neurologic Disorder Brother      Cancer No family hx of      no hx skin cancer         Objective  /82 (BP Location: Left arm, Patient Position: Chair, Cuff Size: Adult Regular)  Ht 5' 9\" (1.753 m)  Wt 230 lb (104.3 kg)  BMI 33.97 kg/m2    GENERAL APPEARANCE: healthy, alert and no distress   GAIT: antalgic  SKIN: no suspicious lesions or rashes  HEENT: Sclera clear, anicteric  CV: good peripheral pulses  RESP: Breathing not labored  NEURO: Normal strength and tone, mentation intact and " speech normal  PSYCH:  mentation appears normal and affect normal/bright    Bilateral Knee exam    Inspection:      mild effusion left       Venous stasis changes bilaterally    Patella:      Mobility -       hypomobile bilateral       Crepitus noted in the patellofemoral joint bilateral    Tender:      medial joint line left    Non Tender:      remainder of knee area left    Knee ROM:      Flexion 100 degrees left       Extension 10 degrees left    Strength:      5/5 with knee extension left    Special Tests:     neg (-) Kobe left       neg (-) anterior drawer left       neg (-) posterior drawer left       neg (-) varus at 0 deg and 30 deg left       neg (-) valgus at 0 deg and 30 deg left    Gait:      normal    Neurovascular:      2+ peripheral pulses bilaterally and brisk capillary refill       sensation grossly intact    Radiology  I ordered, visualized and reviewed these images with the patient  MR LEFT KNEE WITHOUT CONTRAST  9/25/2018 1:14 PM  HISTORY:  Medial and anterior left knee pain since twisting injury  November 2017.   TECHNIQUE:  Axial and coronal T2 with fat suppression. Coronal T1.  Sagittal dual echo T2.  COMPARISON: None.  FINDINGS:   Medial Meniscus: Marked degeneration, maceration and complex tearing  throughout the posterior horn and mid body. There is peripheral  extrusion of the mid body. The anterior horn is relatively normal. No  displaced meniscal fragments or flaps.     Lateral Meniscus: Probable mild degenerative fraying of the free edge  in the mid body. No evidence for tear.     Anterior Cruciate Ligament: Diffuse thickening and striated  intrasubstance increased signal throughout the anterior cruciate  ligament, most likely representing mucoid cystic degenerative change,  although a partial interstitial tear of uncertain age could give a  similar appearance.   Posterior Cruciate Ligament: Unremarkable.   Medial Collateral Ligament: Unremarkable.  Lateral Collateral Ligament  Complex, Popliteus Tendon: The iliotibial  band, fibular collateral ligament, biceps femoris tendon, and  popliteus tendon are unremarkable.  Osseous and Cartilaginous Structures: No acute-appearing bony  abnormality. There is some subchondral cystic change and associated  bone marrow edema at the medial aspect of the weightbearing medial  tibial plateau. This is likely reactive change related to overlying  articular cartilage and meniscal pathology. There is diffuse grade III  and grade IV chondromalacia in the weightbearing medial compartment.  Mild degenerative marginal bony spurring medially. Articular  cartilages in the weightbearing lateral compartment and patellofemoral  compartment are unremarkable.  Extensor Mechanism: The quadriceps and patellar tendons are  unremarkable. The medial and lateral patellar retinacula are normal.  Joint Space: Very small joint effusion. No definite loose bodies  appreciated.  Additional Findings: 5.8 x 2.6 x 0.9 cm popliteal cyst. No  semimembranosus-tibial collateral ligament or pes anserine bursitis.  IMPRESSION:   1. Marked degeneration, maceration and complex tearing throughout the  posterior horn and mid body of the medial meniscus with peripheral  extrusion of the mid body.  2. Mucoid cystic degenerative change anterior cruciate ligament.  3. Advanced chondromalacia weightbearing medial compartment.  4. Small joint space effusion and popliteal cyst.  LUPE CABRERA MD    LEFT KNEE THREE VIEWS AND RIGHT KNEE TWO VIEWS   6/5/2018 4:43 PM  HISTORY: Chronic left knee pain.  COMPARISON: None.  FINDINGS: No fracture or osseous lesion is demonstrated. There is  prominent joint space loss in the medial compartment of both knees  with moderate right and mild left patellofemoral joint degeneration.  The lateral compartment spaces are well preserved. There is  calcification in the vascular structures. No other soft tissue  pathology is noted.  IMPRESSION: Degenerative changes  predominantly involving the medial  compartment of both knees.   BROOK BARRIOS MD    Assessment:  1. Arthritis of left knee        Discussed nature of degenerative arthrosis of the knee. Discussed symptom treatment with over-the-counter medications, ice or heat, topical treatments, and rest if needed. Discussed use of sleeve or wrap for comfort. Discussed benefits of exercise and weight loss to reduce pressure at the knee. Discussed injection therapy. Also briefly discussed future consideration of referral to orthopedic surgery for further evaluation and discussion of arthroplasty.    Plan:  - Today's Plan of Care:  Rehab: Physical Therapy: [facility] [place] - [phone]    -We also discussed other future treatment options:  Steroid injection of knee or Synvisc injection of knee    Follow Up: as needed    Concerning signs and symptoms were reviewed.  The patient expressed understanding of this management plan and all questions were answered at this time.    Thanks for the opportunity to participate in the care of this patient, I will keep you updated on their progress.    CC: Rio Costa MD CAQ  Primary Care Sports Medicine  [facility]    Again, thank you for allowing me to participate in the care of your patient.        Sincerely,        Melinda Costa MD

## 2018-10-08 NOTE — PROGRESS NOTES
History of Present Illness - Radha Corona is a 68 year old female who presents at the request of [facility] for evaluation of the ears. She reports that they were concerned about a mass in the right ear, and cerumen impaction on the left, which precluded her from having an audiogram. She denies any ear pain or drainage. She has no prior ear surgery. She has noticed she has more difficulty hearing others in conversational speech.    Past Medical History -   Patient Active Problem List   Diagnosis     Lumbar Radiculopathy- s/p fusion at L4-5.     CTS (carpal tunnel syndrome)     Neuropathy     Cerebral infarction (H)     Osteoarthritis     Arthropathy     Grave's disease     Edema leg     Postablative hypothyroidism     Advanced directives, counseling/discussion     Hyperlipidemia LDL goal <100     Breast cancer (H)     Chronic atrial fibrillation (H)     Generalized osteoarthrosis, unspecified site     HX: breast cancer     Seasonal allergies     Atrial fibrillation [427.31]     Long term current use of anticoagulant therapy     Migraine without status migrainosus, not intractable, unspecified migraine type       Current Medications -   Current Outpatient Prescriptions:      acetaminophen-codeine (TYLENOL WITH CODEINE #3) 300-30 MG per tablet, Take 1-2 tablets by mouth daily as needed for pain, Disp: 30 tablet, Rfl: 0     atorvastatin (LIPITOR) 20 MG tablet, Take 1 tablet (20 mg) by mouth daily, Disp: 90 tablet, Rfl: 3     furosemide (LASIX) 40 MG tablet, Take 1 tablet (40 mg) by mouth daily as needed (edema), Disp: 90 tablet, Rfl: 3     levothyroxine (SYNTHROID/LEVOTHROID) 75 MCG tablet, Take 1 tablet (75 mcg) by mouth daily, Disp: 90 tablet, Rfl: 3     LORazepam (ATIVAN) 1 MG tablet, Take 1 tablet (1 mg) by mouth as needed for anxiety Take 30-60 minutes prior to MRI.  Use second dose if needed.  Do not operate a vehicle after taking this medication, Disp: 2 tablet, Rfl: 0     metoprolol tartrate (LOPRESSOR) 50  "MG tablet, Take 1 tablet (50 mg) by mouth 2 times daily, Disp: 180 tablet, Rfl: 3     ORDER FOR DME, Equipment being ordered: insole Arch supports, Disp: 1 Box, Rfl: 0     oxybutynin (DITROPAN XL) 10 MG 24 hr tablet, Take 1 tablet (10 mg) by mouth daily, Disp: 90 tablet, Rfl: 3     warfarin (JANTOVEN) 2.5 MG tablet, As directed by Anticoagulation Clinic, current dose 2.5 mg Mon, Wed, Fri, 5 mg rest of week, Disp: 176 tablet, Rfl: 3     [DISCONTINUED] ORDER FOR DME, Equipment being ordered: wrist splints for CTS, Disp: 2 Units, Rfl: 0     [DISCONTINUED] ORDER FOR DME, Equipment being ordered: specialized orthotics for feet, Disp: 1 each, Rfl: 0    Allergies -   Allergies   Allergen Reactions     Penicillins Hives     Simvastatin Cramps     Tetracycline Hives       Social History -   Social History     Social History     Marital status:      Spouse name: N/A     Number of children: N/A     Years of education: N/A     Social History Main Topics     Smoking status: Never Smoker     Smokeless tobacco: Never Used     Alcohol use Yes      Comment: wine occasionally     Drug use: No     Sexual activity: Yes     Partners: Male     Other Topics Concern     Parent/Sibling W/ Cabg, Mi Or Angioplasty Before 65f 55m? No     Social History Narrative       Family History -   Family History   Problem Relation Age of Onset     Breast Cancer Mother      Diabetes Father      Hypertension Father      Lipids Father      Neurologic Disorder Daughter      Migraines     Neurologic Disorder Brother      Cancer No family hx of      no hx skin cancer       Review of Systems - As per HPI and PMHx, otherwise 7 system review of the head and neck negative. 10+ system review negative.    Physical Exam  /77 (BP Location: Left arm, Patient Position: Sitting, Cuff Size: Adult Regular)  Pulse 78  Temp 97.7  F (36.5  C) (Oral)  Ht 1.753 m (5' 9\")  Wt 104.3 kg (230 lb)  BMI 33.97 kg/m2  General - The patient is well nourished and well " developed, and appears to have good nutritional status.  Alert and oriented to person and place, answers questions and cooperates with examination appropriately.   Head and Face - Normocephalic and atraumatic, with no gross asymmetry noted of the contour of the facial features.  The facial nerve is intact, with strong symmetric movements.  Voice and Breathing - The patient was breathing comfortably without the use of accessory muscles. There was no wheezing, stridor, or stertor.  The patients voice was clear and strong, and had appropriate pitch and quality.  Ears - Bilateral pinna and EACs with normal appearing overlying skin. Tympanic membrane intact with good mobility on pneumatic otoscopy bilaterally. Bony landmarks of the ossicular chain are normal. The tympanic membranes are normal in appearance. No retraction, perforation, or masses.  No fluid or purulence was seen in the external canal or the middle ear.   Eyes - Extraocular movements intact.  Sclera were not icteric or injected, conjunctiva were pink and moist.  Mouth - Examination of the oral cavity showed pink, healthy oral mucosa. No lesions or ulcerations noted.  The tongue was mobile and midline, and the dentition were in good condition.    Throat - The walls of the oropharynx were smooth, pink, moist, symmetric, and had no lesions or ulcerations.  The tonsillar pillars and soft palate were symmetric.  The uvula was midline on elevation.  Neck - Normal midline excursion of the laryngotracheal complex during swallowing.  Full range of motion on passive movement.  Palpation of the occipital, submental, submandibular, internal jugular chain, and supraclavicular nodes did not demonstrate any abnormal lymph nodes or masses.  The carotid pulse was palpable bilaterally.  Palpation of the thyroid was soft and smooth, with no nodules or goiter appreciated.  The trachea was mobile and midline.  Nose - External contour is symmetric, no gross deflection or scars.   Nasal mucosa is pink and moist with no abnormal mucus.  The septum was midline and non-obstructive, turbinates of normal size and position.  No polyps, masses, or purulence noted on examination.    Procedure: Cerumen Disimpaction  Diagnosis: Cerumen Impaction    Procedure and Findings  Ears - On examination of the ears, I found that the  ears were impacted with dense cerumen obscuring visualization of the right and left TM.  Therefore, I positioned the patient and I used the binocular surgical microscope to assist in visualization of the affected ear(s).  I began by using a cerumen loop to gently lift the edges of the cerumen mass away from the walls of the external canal.  Once I did this, I was able to suction away fragments of wax and debris using suction.  Once the mass was loose enough, the entire plug was pulled from the canal with microforceps.  The tympanic membrane was intact without sign of perforation or middle ear effusion and minimal ear canal trauma.               Assessment - Radha Corona is a 68 year old female with cerumen in both ear canals. There was no sign of cholesteatoma or osteoma. I reassured her that the ear now appears normal. She was encouraged to return for her audiogram to assess her potential candidacy for hearing aids.       Dr. Jimbo Ayala MD  Otolaryngology  [facility]

## 2018-10-09 ENCOUNTER — OFFICE VISIT (OUTPATIENT)
Dept: OTOLARYNGOLOGY | Facility: CLINIC | Age: 68
End: 2018-10-09
Payer: COMMERCIAL

## 2018-10-09 VITALS
TEMPERATURE: 97.7 F | BODY MASS INDEX: 34.07 KG/M2 | DIASTOLIC BLOOD PRESSURE: 77 MMHG | HEART RATE: 78 BPM | SYSTOLIC BLOOD PRESSURE: 115 MMHG | HEIGHT: 69 IN | WEIGHT: 230 LBS

## 2018-10-09 DIAGNOSIS — H61.899 LESION OF EAR CANAL: ICD-10-CM

## 2018-10-09 DIAGNOSIS — H61.23 BILATERAL IMPACTED CERUMEN: Primary | ICD-10-CM

## 2018-10-09 PROCEDURE — 69210 REMOVE IMPACTED EAR WAX UNI: CPT | Performed by: OTOLARYNGOLOGY

## 2018-10-09 PROCEDURE — 99203 OFFICE O/P NEW LOW 30 MIN: CPT | Mod: 25 | Performed by: OTOLARYNGOLOGY

## 2018-10-09 ASSESSMENT — PAIN SCALES - GENERAL: PAINLEVEL: NO PAIN (0)

## 2018-10-09 NOTE — LETTER
10/9/2018         RE: Radha Corona  [address] [place] [phone]        Dear Colleague,    Thank you for referring your patient, Radha Corona, to the [facility]. Please see a copy of my visit note below.        History of Present Illness - Radha Corona is a 68 year old female who presents at the request of [facility] for evaluation of the ears. She reports that they were concerned about a mass in the right ear, and cerumen impaction on the left, which precluded her from having an audiogram. She denies any ear pain or drainage. She has no prior ear surgery. She has noticed she has more difficulty hearing others in conversational speech.    Past Medical History -   Patient Active Problem List   Diagnosis     Lumbar Radiculopathy- s/p fusion at L4-5.     CTS (carpal tunnel syndrome)     Neuropathy     Cerebral infarction (H)     Osteoarthritis     Arthropathy     Grave's disease     Edema leg     Postablative hypothyroidism     Advanced directives, counseling/discussion     Hyperlipidemia LDL goal <100     Breast cancer (H)     Chronic atrial fibrillation (H)     Generalized osteoarthrosis, unspecified site     HX: breast cancer     Seasonal allergies     Atrial fibrillation [427.31]     Long term current use of anticoagulant therapy     Migraine without status migrainosus, not intractable, unspecified migraine type       Current Medications -   Current Outpatient Prescriptions:      acetaminophen-codeine (TYLENOL WITH CODEINE #3) 300-30 MG per tablet, Take 1-2 tablets by mouth daily as needed for pain, Disp: 30 tablet, Rfl: 0     atorvastatin (LIPITOR) 20 MG tablet, Take 1 tablet (20 mg) by mouth daily, Disp: 90 tablet, Rfl: 3     furosemide (LASIX) 40 MG tablet, Take 1 tablet (40 mg) by mouth daily as needed (edema), Disp: 90 tablet, Rfl: 3     levothyroxine (SYNTHROID/LEVOTHROID) 75 MCG tablet, Take 1 tablet (75 mcg) by mouth daily, Disp: 90 tablet, Rfl: 3      LORazepam (ATIVAN) 1 MG tablet, Take 1 tablet (1 mg) by mouth as needed for anxiety Take 30-60 minutes prior to MRI.  Use second dose if needed.  Do not operate a vehicle after taking this medication, Disp: 2 tablet, Rfl: 0     metoprolol tartrate (LOPRESSOR) 50 MG tablet, Take 1 tablet (50 mg) by mouth 2 times daily, Disp: 180 tablet, Rfl: 3     ORDER FOR DME, Equipment being ordered: insole Arch supports, Disp: 1 Box, Rfl: 0     oxybutynin (DITROPAN XL) 10 MG 24 hr tablet, Take 1 tablet (10 mg) by mouth daily, Disp: 90 tablet, Rfl: 3     warfarin (JANTOVEN) 2.5 MG tablet, As directed by Anticoagulation Clinic, current dose 2.5 mg Mon, Wed, Fri, 5 mg rest of week, Disp: 176 tablet, Rfl: 3     [DISCONTINUED] ORDER FOR DME, Equipment being ordered: wrist splints for CTS, Disp: 2 Units, Rfl: 0     [DISCONTINUED] ORDER FOR DME, Equipment being ordered: specialized orthotics for feet, Disp: 1 each, Rfl: 0    Allergies -   Allergies   Allergen Reactions     Penicillins Hives     Simvastatin Cramps     Tetracycline Hives       Social History -   Social History     Social History     Marital status:      Spouse name: N/A     Number of children: N/A     Years of education: N/A     Social History Main Topics     Smoking status: Never Smoker     Smokeless tobacco: Never Used     Alcohol use Yes      Comment: wine occasionally     Drug use: No     Sexual activity: Yes     Partners: Male     Other Topics Concern     Parent/Sibling W/ Cabg, Mi Or Angioplasty Before 65f 55m? No     Social History Narrative       Family History -   Family History   Problem Relation Age of Onset     Breast Cancer Mother      Diabetes Father      Hypertension Father      Lipids Father      Neurologic Disorder Daughter      Migraines     Neurologic Disorder Brother      Cancer No family hx of      no hx skin cancer       Review of Systems - As per HPI and PMHx, otherwise 7 system review of the head and neck negative. 10+ system review  "negative.    Physical Exam  /77 (BP Location: Left arm, Patient Position: Sitting, Cuff Size: Adult Regular)  Pulse 78  Temp 97.7  F (36.5  C) (Oral)  Ht 1.753 m (5' 9\")  Wt 104.3 kg (230 lb)  BMI 33.97 kg/m2  General - The patient is well nourished and well developed, and appears to have good nutritional status.  Alert and oriented to person and place, answers questions and cooperates with examination appropriately.   Head and Face - Normocephalic and atraumatic, with no gross asymmetry noted of the contour of the facial features.  The facial nerve is intact, with strong symmetric movements.  Voice and Breathing - The patient was breathing comfortably without the use of accessory muscles. There was no wheezing, stridor, or stertor.  The patients voice was clear and strong, and had appropriate pitch and quality.  Ears - Bilateral pinna and EACs with normal appearing overlying skin. Tympanic membrane intact with good mobility on pneumatic otoscopy bilaterally. Bony landmarks of the ossicular chain are normal. The tympanic membranes are normal in appearance. No retraction, perforation, or masses.  No fluid or purulence was seen in the external canal or the middle ear.   Eyes - Extraocular movements intact.  Sclera were not icteric or injected, conjunctiva were pink and moist.  Mouth - Examination of the oral cavity showed pink, healthy oral mucosa. No lesions or ulcerations noted.  The tongue was mobile and midline, and the dentition were in good condition.    Throat - The walls of the oropharynx were smooth, pink, moist, symmetric, and had no lesions or ulcerations.  The tonsillar pillars and soft palate were symmetric.  The uvula was midline on elevation.  Neck - Normal midline excursion of the laryngotracheal complex during swallowing.  Full range of motion on passive movement.  Palpation of the occipital, submental, submandibular, internal jugular chain, and supraclavicular nodes did not demonstrate any " abnormal lymph nodes or masses.  The carotid pulse was palpable bilaterally.  Palpation of the thyroid was soft and smooth, with no nodules or goiter appreciated.  The trachea was mobile and midline.  Nose - External contour is symmetric, no gross deflection or scars.  Nasal mucosa is pink and moist with no abnormal mucus.  The septum was midline and non-obstructive, turbinates of normal size and position.  No polyps, masses, or purulence noted on examination.    Procedure: Cerumen Disimpaction  Diagnosis: Cerumen Impaction    Procedure and Findings  Ears - On examination of the ears, I found that the  ears were impacted with dense cerumen obscuring visualization of the right and left TM.  Therefore, I positioned the patient and I used the binocular surgical microscope to assist in visualization of the affected ear(s).  I began by using a cerumen loop to gently lift the edges of the cerumen mass away from the walls of the external canal.  Once I did this, I was able to suction away fragments of wax and debris using suction.  Once the mass was loose enough, the entire plug was pulled from the canal with microforceps.  The tympanic membrane was intact without sign of perforation or middle ear effusion and minimal ear canal trauma.               Assessment - Radha Corona is a 68 year old female with cerumen in both ear canals. There was no sign of cholesteatoma or osteoma. I reassured her that the ear now appears normal. She was encouraged to return for her audiogram to assess her potential candidacy for hearing aids.       Dr. Jimbo Ayala MD  Otolaryngology  [facility]        Again, thank you for allowing me to participate in the care of your patient.        Sincerely,        Jimbo yAala MD

## 2018-10-09 NOTE — MR AVS SNAPSHOT
"              After Visit Summary   10/9/2018    Radha Corona    MRN: [id number]           Patient Information     Date Of Birth          1950        Visit Information        Provider Department      10/9/2018 10:15 AM Jimbo Ayala MD [facility]        Today's Diagnoses     Bilateral impacted cerumen    -  1    Lesion of ear canal          Care Instructions    Per Physician's instructions            Follow-ups after your visit        Your next 10 appointments already scheduled     Nov 19, 2018  9:30 AM CST   [facility] Visit with Edy Dove MD   [facility]    [address] [address]   [phone]              Who to contact     If you have questions or need follow up information about today's clinic visit or your schedule please contact [facility] directly at [phone].  Normal or non-critical lab and imaging results will be communicated to you by MyChart, letter or phone within 4 business days after the clinic has received the results. If you do not hear from us within 7 days, please contact the clinic through MyChart or phone. If you have a critical or abnormal lab result, we will notify you by phone as soon as possible.  Submit refill requests through [facility] or call your pharmacy and they will forward the refill request to us. Please allow 3 business days for your refill to be completed.          Additional Information About Your Visit        Care EveryWhere ID     This is your Care EveryWhere ID. This could be used by other organizations to access your [place] medical records  [id number]        Your Vitals Were     Pulse Temperature Height BMI (Body Mass Index)          78 97.7  F (36.5  C) (Oral) 1.753 m (5' 9\") 33.97 kg/m2         Blood Pressure from Last 3 Encounters:   10/09/18 115/77   10/03/18 120/82   09/19/18 118/80    Weight from Last 3 Encounters:   10/09/18 104.3 kg (230 lb) "   10/03/18 104.3 kg (230 lb)   09/19/18 104.3 kg (230 lb)              We Performed the Following     Remove Cerumen          Today's Medication Changes          These changes are accurate as of 10/9/18  9:02 PM.  If you have any questions, ask your nurse or doctor.               These medicines have changed or have updated prescriptions.        Dose/Directions    order for DME   This may have changed:  Another medication with the same name was removed. Continue taking this medication, and follow the directions you see here.   Used for:  Foot drop, right   Changed by:  Jimbo Ayala MD        Equipment being ordered: insole Arch supports   Quantity:  1 Box   Refills:  0                Primary Care Provider Office Phone # Fax #    JANICE Nichole [facility] [phone] [address] [address]        Equal Access to Services     STEPHON DAVIS : Hadii cole etienneo Sonena, waaxda luqadaha, qaybta kaalmada adehermelindoyamelanie, yaritza junior . So [facility] [phone].    ATENCIÓN: Si habla español, tiene a howard disposición servicios gratuitos de asistencia lingüística. Jessee al [phone].    We comply with applicable federal civil rights laws and [place] laws. We do not discriminate on the basis of race, color, national origin, age, disability, sex, sexual orientation, or gender identity.            Thank you!     Thank you for choosing [facility]  for your care. Our goal is always to provide you with excellent care. Hearing back from our patients is one way we can continue to improve our services. Please take a few minutes to complete the written survey that you may receive in the mail after your visit with us. Thank you!             Your Updated Medication List - Protect others around you: Learn how to safely use, store and throw away your medicines at www.disposemymeds.org.          This list is accurate as of 10/9/18  9:02 PM.  Always use your most  recent med list.                   Brand Name Dispense Instructions for use Diagnosis    acetaminophen-codeine 300-30 MG per tablet    TYLENOL WITH CODEINE #3    30 tablet    Take 1-2 tablets by mouth daily as needed for pain    Migraine without status migrainosus, not intractable, unspecified migraine type       atorvastatin 20 MG tablet    LIPITOR    90 tablet    Take 1 tablet (20 mg) by mouth daily    Hyperlipidemia LDL goal <70       furosemide 40 MG tablet    LASIX    90 tablet    Take 1 tablet (40 mg) by mouth daily as needed (edema)    Localized edema       levothyroxine 75 MCG tablet    SYNTHROID/LEVOTHROID    90 tablet    Take 1 tablet (75 mcg) by mouth daily    Hypothyroidism, unspecified type       LORazepam 1 MG tablet    ATIVAN    2 tablet    Take 1 tablet (1 mg) by mouth as needed for anxiety Take 30-60 minutes prior to MRI.  Use second dose if needed.  Do not operate a vehicle after taking this medication    Claustrophobia       metoprolol tartrate 50 MG tablet    LOPRESSOR    180 tablet    Take 1 tablet (50 mg) by mouth 2 times daily    Paroxysmal atrial fibrillation (H)       order for DME     1 Box    Equipment being ordered: insole Arch supports    Foot drop, right       oxybutynin 10 MG 24 hr tablet    DITROPAN XL    90 tablet    Take 1 tablet (10 mg) by mouth daily    Overactive bladder       warfarin 2.5 MG tablet    JANTOVEN    176 tablet    As directed by Anticoagulation Clinic, current dose 2.5 mg Mon, Wed, Fri, 5 mg rest of week    Chronic atrial fibrillation (H)

## 2018-10-09 NOTE — NURSING NOTE
"Initial /77 (BP Location: Left arm, Patient Position: Sitting, Cuff Size: Adult Regular)  Pulse 78  Temp 97.7  F (36.5  C) (Oral)  Ht 1.753 m (5' 9\")  Wt 104.3 kg (230 lb)  BMI 33.97 kg/m2 Estimated body mass index is 33.97 kg/(m^2) as calculated from the following:    Height as of this encounter: 1.753 m (5' 9\").    Weight as of this encounter: 104.3 kg (230 lb). .    Kelsey Swain CMA    "

## 2018-11-15 ENCOUNTER — ANTICOAGULATION THERAPY VISIT (OUTPATIENT)
Dept: ANTICOAGULATION | Facility: CLINIC | Age: 68
End: 2018-11-15
Payer: COMMERCIAL

## 2018-11-15 DIAGNOSIS — Z79.01 LONG TERM CURRENT USE OF ANTICOAGULANT THERAPY: ICD-10-CM

## 2018-11-15 DIAGNOSIS — I48.91 ATRIAL FIBRILLATION (H): ICD-10-CM

## 2018-11-15 DIAGNOSIS — I63.9 CEREBRAL INFARCTION (H): ICD-10-CM

## 2018-11-15 LAB — INR POINT OF CARE: 2.2 (ref 0.86–1.14)

## 2018-11-15 PROCEDURE — 99207 ZZC NO CHARGE NURSE ONLY: CPT

## 2018-11-15 PROCEDURE — 36416 COLLJ CAPILLARY BLOOD SPEC: CPT

## 2018-11-15 PROCEDURE — 85610 PROTHROMBIN TIME: CPT | Mod: QW

## 2018-11-15 NOTE — PROGRESS NOTES
ANTICOAGULATION FOLLOW-UP CLINIC VISIT    Patient Name:  Radha Corona  Date:  11/15/2018  Contact Type:  Face to Face    SUBJECTIVE:     Patient Findings     Positives No Problem Findings    Comments No changes in medications, activity, or diet noted. No bleeding or increased bruising noted. Took warfarin as prescribed.  Patient is to continue maintenance warfarin plan, and check INR in 6 weeks.  Pt states that she will be leaving for [place] in Dec - she will need a lab order at the next visit.  Patient verbalizes understanding and agrees to plan. No further questions or concerns.           OBJECTIVE    INR Protime   Date Value Ref Range Status   11/15/2018 2.2 (A) 0.86 - 1.14 Final       ASSESSMENT / PLAN  INR assessment THER    Recheck INR In: 6 WEEKS    INR Location Clinic      Anticoagulation Summary as of 11/15/2018     INR goal 2.0-3.0   Today's INR 2.2   Warfarin maintenance plan 2.5 mg (2.5 mg x 1) on Mon, Wed, Fri; 5 mg (2.5 mg x 2) all other days   Full warfarin instructions 2.5 mg on Mon, Wed, Fri; 5 mg all other days   Weekly warfarin total 27.5 mg   No change documented Barb Barriga, RN   Plan last modified Melissa Mcmillan, RN (3/24/2015)   Next INR check 12/21/2018   Priority INR   Target end date Indefinite    Indications   Cerebral infarction (H) [I63.9]  Atrial fibrillation [427.31] [I48.91]  Long term current use of anticoagulant therapy [Z79.01]         Anticoagulation Episode Summary     INR check location     Preferred lab     Send INR reminders to [facility]    Comments * 2.5mg tablets. Leaving for [place] in Dec - May: will need lab order      Anticoagulation Care Providers     Provider Role Specialty Phone number    Myrna Staples, JANICE CNP Responsible Nurse Practitioner [phone]            See the Encounter Report to view Anticoagulation Flowsheet and Dosing Calendar (Go to Encounters tab in chart review, and find the Anticoagulation Therapy Visit)        Barb  IMAN Barriga

## 2018-11-15 NOTE — MR AVS SNAPSHOT
Radha Corona   11/15/2018 1:30 PM   Anticoagulation Therapy Visit    Description:  68 year old female   Provider:  [facility]   Department:  [facility]           INR as of 11/15/2018     Today's INR 2.2      Anticoagulation Summary as of 11/15/2018     INR goal 2.0-3.0   Today's INR 2.2   Full warfarin instructions 2.5 mg on Mon, Wed, Fri; 5 mg all other days   Next INR check 12/21/2018    Indications   Cerebral infarction (H) [I63.9]  Atrial fibrillation [427.31] [I48.91]  Long term current use of anticoagulant therapy [Z79.01]         Your next Anticoagulation Clinic appointment(s)     Dec 21, 2018 10:30 AM CST   Anticoagulation Visit with [facility] [facility]   [facility]    [address] [address] [phone]              Contact Numbers     Please call [phone] with any problems or questions regarding your therapy.    If you need to cancel and/or reschedule your appointment please call one of the following numbers:  [facility] - [phone]  [place] - [phone]  [place] - [phone]  \[facility]\"" [phone]  [place] - [phone]            November 2018 Details    Sun Mon Tue Wed Thu Fri Sat         1               2               3                 4               5               6               7               8               9               10                 11               12               13               14               15      5 mg   See details      16      2.5 mg         17      5 mg           18      5 mg         19      2.5 mg         20      5 mg         21      2.5 mg         22      5 mg         23      2.5 mg         24      5 mg           25      5 mg         26      2.5 mg         27      5 mg         28      2.5 mg         29      5 mg         30      2.5 mg           Date Details   11/15 This INR check               How to take your warfarin dose     To take:  2.5 mg Take 1 of the 2.5 mg tablets.    To  take:  5 mg Take 2 of the 2.5 mg tablets.           December 2018 Details    Sun Mon Tue Wed Thu Fri Sat           1      5 mg           2      5 mg         3      2.5 mg         4      5 mg         5      2.5 mg         6      5 mg         7      2.5 mg         8      5 mg           9      5 mg         10      2.5 mg         11      5 mg         12      2.5 mg         13      5 mg         14      2.5 mg         15      5 mg           16      5 mg         17      2.5 mg         18      5 mg         19      2.5 mg         20      5 mg         21            22                 23               24               25               26               27               28               29                 30               31                     Date Details   No additional details    Date of next INR:  12/21/2018         How to take your warfarin dose     To take:  2.5 mg Take 1 of the 2.5 mg tablets.    To take:  5 mg Take 2 of the 2.5 mg tablets.

## 2018-11-19 ENCOUNTER — OFFICE VISIT (OUTPATIENT)
Dept: DERMATOLOGY | Facility: CLINIC | Age: 68
End: 2018-11-19
Payer: COMMERCIAL

## 2018-11-19 VITALS — SYSTOLIC BLOOD PRESSURE: 124 MMHG | OXYGEN SATURATION: 99 % | HEART RATE: 82 BPM | DIASTOLIC BLOOD PRESSURE: 71 MMHG

## 2018-11-19 DIAGNOSIS — D23.9 DERMAL NEVUS: ICD-10-CM

## 2018-11-19 DIAGNOSIS — Z85.828 HISTORY OF SKIN CANCER: Primary | ICD-10-CM

## 2018-11-19 DIAGNOSIS — D18.00 ANGIOMA: ICD-10-CM

## 2018-11-19 DIAGNOSIS — L82.1 SK (SEBORRHEIC KERATOSIS): ICD-10-CM

## 2018-11-19 DIAGNOSIS — L81.4 LENTIGO: ICD-10-CM

## 2018-11-19 PROCEDURE — 99213 OFFICE O/P EST LOW 20 MIN: CPT | Performed by: DERMATOLOGY

## 2018-11-19 NOTE — MR AVS SNAPSHOT
After Visit Summary   11/19/2018    Radha Corona    MRN: [id number]           Patient Information     Date Of Birth          1950        Visit Information        Provider Department      11/19/2018 9:30 AM Edy Dove MD [facility]        Today's Diagnoses     History of skin cancer    -  1    Lentigo        SK (seborrheic keratosis)        Angioma        Dermal nevus           Follow-ups after your visit        Your next 10 appointments already scheduled     Dec 21, 2018 10:30 AM CST   Anticoagulation Visit with [place] [facility]   [facility]    [address] [address]   [phone]            Oct 21, 2019 10:00 AM CDT   Return Visit with Edy Dove MD   [facility]    [address] [address]   [phone]              Who to contact     If you have questions or need follow up information about today's clinic visit or your schedule please contact [facility] directly at [phone].  Normal or non-critical lab and imaging results will be communicated to you by MyChart, letter or phone within 4 business days after the clinic has received the results. If you do not hear from us within 7 days, please contact the clinic through MyChart or phone. If you have a critical or abnormal lab result, we will notify you by phone as soon as possible.  Submit refill requests through [facility] or call your pharmacy and they will forward the refill request to us. Please allow 3 business days for your refill to be completed.          Additional Information About Your Visit        Care EveryWhere ID     This is your Care EveryWhere ID. This could be used by other organizations to access your [place] medical records  [id number]        Your Vitals Were     Pulse Pulse Oximetry                82 99%           Blood Pressure from Last 3  Encounters:   11/19/18 124/71   10/09/18 115/77   10/03/18 120/82    Weight from Last 3 Encounters:   10/09/18 104.3 kg (230 lb)   10/03/18 104.3 kg (230 lb)   09/19/18 104.3 kg (230 lb)              Today, you had the following     No orders found for display       Primary Care Provider Office Phone # Fax #    JANICE Nichole [facility] [phone] [address] [address]        Equal Access to Services     ABHAY DAVIS : Hadii cole ku hadasho Sonena, waaxda luqadaha, qaybta kaalmada adekymberly, yaritza junior . So [facility] [phone].    ATENCIÓN: Si habla español, tiene a howard disposición servicios gratuitos de asistencia lingüística. Llame al [phone].    We comply with applicable federal civil rights laws and [place] laws. We do not discriminate on the basis of race, color, national origin, age, disability, sex, sexual orientation, or gender identity.            Thank you!     Thank you for choosing [facility]  for your care. Our goal is always to provide you with excellent care. Hearing back from our patients is one way we can continue to improve our services. Please take a few minutes to complete the written survey that you may receive in the mail after your visit with us. Thank you!             Your Updated Medication List - Protect others around you: Learn how to safely use, store and throw away your medicines at www.disposemymeds.org.          This list is accurate as of 11/19/18  9:54 AM.  Always use your most recent med list.                   Brand Name Dispense Instructions for use Diagnosis    acetaminophen-codeine 300-30 MG per tablet    TYLENOL WITH CODEINE #3    30 tablet    Take 1-2 tablets by mouth daily as needed for pain    Migraine without status migrainosus, not intractable, unspecified migraine type       atorvastatin 20 MG tablet    LIPITOR    90 tablet    Take 1 tablet (20 mg) by mouth daily    Hyperlipidemia LDL  goal <70       furosemide 40 MG tablet    LASIX    90 tablet    Take 1 tablet (40 mg) by mouth daily as needed (edema)    Localized edema       levothyroxine 75 MCG tablet    SYNTHROID/LEVOTHROID    90 tablet    Take 1 tablet (75 mcg) by mouth daily    Hypothyroidism, unspecified type       LORazepam 1 MG tablet    ATIVAN    2 tablet    Take 1 tablet (1 mg) by mouth as needed for anxiety Take 30-60 minutes prior to MRI.  Use second dose if needed.  Do not operate a vehicle after taking this medication    Claustrophobia       metoprolol tartrate 50 MG tablet    LOPRESSOR    180 tablet    Take 1 tablet (50 mg) by mouth 2 times daily    Paroxysmal atrial fibrillation (H)       order for DME     1 Box    Equipment being ordered: insole Arch supports    Foot drop, right       oxybutynin 10 MG 24 hr tablet    DITROPAN XL    90 tablet    Take 1 tablet (10 mg) by mouth daily    Overactive bladder       warfarin 2.5 MG tablet    JANTOVEN    176 tablet    As directed by Anticoagulation Clinic, current dose 2.5 mg Mon, Wed, Fri, 5 mg rest of week    Chronic atrial fibrillation (H)

## 2018-11-19 NOTE — LETTER
11/19/2018         RE: Radha Corona  [address] [place] [address]        Dear Colleague,    Thank you for referring your patient, Radha Corona, to the [facility]. Please see a copy of my visit note below.    Radha Corona is a 68 year old year old female patient here today for hx of non-melanoma skin cancer.  She denies any new or changing skin lesions.  Patient reports the following symptoms:  none .  Patient reports the following previous treatments none.  Patient reports the following modifying factors none.  Associated symptoms: none.  Patient has no other skin complaints today.  Remainder of the HPI, Meds, PMH, Allergies, FH, and SH was reviewed in chart.      Past Medical History:   Diagnosis Date     Abscess of intestine 8/3/07    ptl colectomy     Basal cell carcinoma      Displacement of lumbar intervertebral disc without myelopathy 8/3/07     GERD (gastroesophageal reflux disease)      Malignant neoplasm of breast (female), unspecified site 8/3/07     Migraine, unspecified, without mention of intractable migraine without mention of status migrainosus      Other and unspecified hyperlipidemia 8/3/07       Past Surgical History:   Procedure Laterality Date     ARTHROSCOPY KNEE RT/LT  4/2005     BREAST LUMPECTOMY, RT/LT  2/7/06    Right Breast Lumpectomy     C ORAL SURGERY PROCEDURE      wisdom teeth extraction     COLECTOMY      Reversal of Colostomy 8/05     D & C       HC SACROPLASTY  2001    Hx of Spine Surgery L4-5     HYSTERECTOMY, DIOMEDES  5/2005        Family History   Problem Relation Age of Onset     Breast Cancer Mother      Diabetes Father      Hypertension Father      Lipids Father      Neurologic Disorder Daughter      Migraines     Neurologic Disorder Brother      Cancer No family hx of      no hx skin cancer     Melanoma No family hx of        Social History     Social History     Marital status:      Spouse name: N/A     Number of  children: N/A     Years of education: N/A     Occupational History     Not on file.     Social History Main Topics     Smoking status: Never Smoker     Smokeless tobacco: Never Used     Alcohol use Yes      Comment: wine occasionally     Drug use: No     Sexual activity: Yes     Partners: Male     Other Topics Concern     Parent/Sibling W/ Cabg, Mi Or Angioplasty Before 65f 55m? No     Social History Narrative       Outpatient Encounter Prescriptions as of 11/19/2018   Medication Sig Dispense Refill     acetaminophen-codeine (TYLENOL WITH CODEINE #3) 300-30 MG per tablet Take 1-2 tablets by mouth daily as needed for pain 30 tablet 0     atorvastatin (LIPITOR) 20 MG tablet Take 1 tablet (20 mg) by mouth daily 90 tablet 3     furosemide (LASIX) 40 MG tablet Take 1 tablet (40 mg) by mouth daily as needed (edema) 90 tablet 3     levothyroxine (SYNTHROID/LEVOTHROID) 75 MCG tablet Take 1 tablet (75 mcg) by mouth daily 90 tablet 3     metoprolol tartrate (LOPRESSOR) 50 MG tablet Take 1 tablet (50 mg) by mouth 2 times daily 180 tablet 3     ORDER FOR DME Equipment being ordered: insole Arch supports 1 Box 0     oxybutynin (DITROPAN XL) 10 MG 24 hr tablet Take 1 tablet (10 mg) by mouth daily 90 tablet 3     warfarin (JANTOVEN) 2.5 MG tablet As directed by Anticoagulation Clinic, current dose 2.5 mg Mon, Wed, Fri, 5 mg rest of week 176 tablet 3     LORazepam (ATIVAN) 1 MG tablet Take 1 tablet (1 mg) by mouth as needed for anxiety Take 30-60 minutes prior to MRI.  Use second dose if needed.  Do not operate a vehicle after taking this medication (Patient not taking: Reported on 11/19/2018) 2 tablet 0     No facility-administered encounter medications on file as of 11/19/2018.              Review Of Systems  Skin: As above  Eyes: negative  Ears/Nose/Throat: negative  Respiratory: No shortness of breath, dyspnea on exertion, cough, or hemoptysis  Cardiovascular: negative  Gastrointestinal: negative  Genitourinary:  negative  Musculoskeletal: negative  Neurologic: negative  Psychiatric: negative  Hematologic/Lymphatic/Immunologic: negative  Endocrine: negative      O:   NAD, WDWN, Alert & Oriented, Mood & Affect wnl, Vitals stable   Here today alone   /71  Pulse 82  SpO2 99%   General appearance normal   Vitals stable   Alert, oriented and in no acute distress      Following lymph nodes palpated: Occipital, Cervical, Supraclavicular no lad       Stuck on papules and brown macules on trunk and ext   Red papules on trunk  Flesh colored papules on trunk     The remainder of the full exam was unremarkable; the following areas were examined:  conjunctiva/lids, oral mucosa, neck, peripheral vascular system, abdomen, lymph nodes, digits/nails, eccrine and apocrine glands, scalp/hair, face, neck, chest, abdomen, buttocks, back, RUE, LUE, RLE, LLE       Eyes: Conjunctivae/lids:Normal     ENT: Lips, buccal mucosa, tongue: normal    MSK:Normal    Cardiovascular: peripheral edema none    Pulm: Breathing Normal    Lymph Nodes: No Head and Neck Lymphadenopathy     Neuro/Psych: Orientation:Normal; Mood/Affect:Normal      A/P:  1. Seborrheic keratosis, lentigo, angioma, dermal nevus, hx of non-melanoma skin cancer     BENIGN LESIONS DISCUSSED WITH PATIENT:  I discussed the specifics of tumor, prognosis, and genetics of benign lesions.  I explained that treatment of these lesions would be purely cosmetic and not medically neccessary.  I discussed with patient different removal options including excision, cautery and /or laser.      Nature and genetics of benign skin lesions dicussed with patient.  Signs and Symptoms of skin cancer discussed with patient.  ABCDEs of melanoma reviewed with patient.  Patient encouraged to perform monthly skin exams.  UV precautions reviewed with patient.  Patient to follow up with Primary Care provider regarding elevated blood pressure.  Skin care regimen reviewed with patient: Eliminate harsh soaps, i.e.  Dial, zest, irsih spring; Mild soaps such as Cetaphil or Dove sensitive skin, avoid hot or cold showers, aggressive use of emollients including vanicream, cetaphil or cerave discussed with patient.    Risks of non-melanoma skin cancer discussed with patient   Return to clinic 12 months  Patient to follow up with Primary Care provider regarding elevated blood pressure.        Again, thank you for allowing me to participate in the care of your patient.        Sincerely,        Edy Dove MD

## 2018-11-19 NOTE — NURSING NOTE
"Initial /71  Pulse 82  SpO2 99% Estimated body mass index is 33.97 kg/(m^2) as calculated from the following:    Height as of 10/9/18: 1.753 m (5' 9\").    Weight as of 10/9/18: 104.3 kg (230 lb). .    Gwendolyn Harrington LPN    "

## 2018-11-19 NOTE — PROGRESS NOTES
Radha Corona is a 68 year old year old female patient here today for hx of non-melanoma skin cancer.  She denies any new or changing skin lesions.  Patient reports the following symptoms:  none .  Patient reports the following previous treatments none.  Patient reports the following modifying factors none.  Associated symptoms: none.  Patient has no other skin complaints today.  Remainder of the HPI, Meds, PMH, Allergies, FH, and SH was reviewed in chart.      Past Medical History:   Diagnosis Date     Abscess of intestine 8/3/07    ptl colectomy     Basal cell carcinoma      Displacement of lumbar intervertebral disc without myelopathy 8/3/07     GERD (gastroesophageal reflux disease)      Malignant neoplasm of breast (female), unspecified site 8/3/07     Migraine, unspecified, without mention of intractable migraine without mention of status migrainosus      Other and unspecified hyperlipidemia 8/3/07       Past Surgical History:   Procedure Laterality Date     ARTHROSCOPY KNEE RT/LT  4/2005     BREAST LUMPECTOMY, RT/LT  2/7/06    Right Breast Lumpectomy     C ORAL SURGERY PROCEDURE      wisdom teeth extraction     COLECTOMY      Reversal of Colostomy 8/05     D & C       HC SACROPLASTY  2001    Hx of Spine Surgery L4-5     HYSTERECTOMY, DIOMEDES  5/2005        Family History   Problem Relation Age of Onset     Breast Cancer Mother      Diabetes Father      Hypertension Father      Lipids Father      Neurologic Disorder Daughter      Migraines     Neurologic Disorder Brother      Cancer No family hx of      no hx skin cancer     Melanoma No family hx of        Social History     Social History     Marital status:      Spouse name: N/A     Number of children: N/A     Years of education: N/A     Occupational History     Not on file.     Social History Main Topics     Smoking status: Never Smoker     Smokeless tobacco: Never Used     Alcohol use Yes      Comment: wine occasionally     Drug use: No     Sexual  activity: Yes     Partners: Male     Other Topics Concern     Parent/Sibling W/ Cabg, Mi Or Angioplasty Before 65f 55m? No     Social History Narrative       Outpatient Encounter Prescriptions as of 11/19/2018   Medication Sig Dispense Refill     acetaminophen-codeine (TYLENOL WITH CODEINE #3) 300-30 MG per tablet Take 1-2 tablets by mouth daily as needed for pain 30 tablet 0     atorvastatin (LIPITOR) 20 MG tablet Take 1 tablet (20 mg) by mouth daily 90 tablet 3     furosemide (LASIX) 40 MG tablet Take 1 tablet (40 mg) by mouth daily as needed (edema) 90 tablet 3     levothyroxine (SYNTHROID/LEVOTHROID) 75 MCG tablet Take 1 tablet (75 mcg) by mouth daily 90 tablet 3     metoprolol tartrate (LOPRESSOR) 50 MG tablet Take 1 tablet (50 mg) by mouth 2 times daily 180 tablet 3     ORDER FOR DME Equipment being ordered: insole Arch supports 1 Box 0     oxybutynin (DITROPAN XL) 10 MG 24 hr tablet Take 1 tablet (10 mg) by mouth daily 90 tablet 3     warfarin (JANTOVEN) 2.5 MG tablet As directed by Anticoagulation Clinic, current dose 2.5 mg Mon, Wed, Fri, 5 mg rest of week 176 tablet 3     LORazepam (ATIVAN) 1 MG tablet Take 1 tablet (1 mg) by mouth as needed for anxiety Take 30-60 minutes prior to MRI.  Use second dose if needed.  Do not operate a vehicle after taking this medication (Patient not taking: Reported on 11/19/2018) 2 tablet 0     No facility-administered encounter medications on file as of 11/19/2018.              Review Of Systems  Skin: As above  Eyes: negative  Ears/Nose/Throat: negative  Respiratory: No shortness of breath, dyspnea on exertion, cough, or hemoptysis  Cardiovascular: negative  Gastrointestinal: negative  Genitourinary: negative  Musculoskeletal: negative  Neurologic: negative  Psychiatric: negative  Hematologic/Lymphatic/Immunologic: negative  Endocrine: negative      O:   NAD, WDWN, Alert & Oriented, Mood & Affect wnl, Vitals stable   Here today alone   /71  Pulse 82  SpO2  99%   General appearance normal   Vitals stable   Alert, oriented and in no acute distress      Following lymph nodes palpated: Occipital, Cervical, Supraclavicular no lad       Stuck on papules and brown macules on trunk and ext   Red papules on trunk  Flesh colored papules on trunk     The remainder of the full exam was unremarkable; the following areas were examined:  conjunctiva/lids, oral mucosa, neck, peripheral vascular system, abdomen, lymph nodes, digits/nails, eccrine and apocrine glands, scalp/hair, face, neck, chest, abdomen, buttocks, back, RUE, LUE, RLE, LLE       Eyes: Conjunctivae/lids:Normal     ENT: Lips, buccal mucosa, tongue: normal    MSK:Normal    Cardiovascular: peripheral edema none    Pulm: Breathing Normal    Lymph Nodes: No Head and Neck Lymphadenopathy     Neuro/Psych: Orientation:Normal; Mood/Affect:Normal      A/P:  1. Seborrheic keratosis, lentigo, angioma, dermal nevus, hx of non-melanoma skin cancer     BENIGN LESIONS DISCUSSED WITH PATIENT:  I discussed the specifics of tumor, prognosis, and genetics of benign lesions.  I explained that treatment of these lesions would be purely cosmetic and not medically neccessary.  I discussed with patient different removal options including excision, cautery and /or laser.      Nature and genetics of benign skin lesions dicussed with patient.  Signs and Symptoms of skin cancer discussed with patient.  ABCDEs of melanoma reviewed with patient.  Patient encouraged to perform monthly skin exams.  UV precautions reviewed with patient.  Patient to follow up with Primary Care provider regarding elevated blood pressure.  Skin care regimen reviewed with patient: Eliminate harsh soaps, i.e. Dial, zest, irsih spring; Mild soaps such as Cetaphil or Dove sensitive skin, avoid hot or cold showers, aggressive use of emollients including vanicream, cetaphil or cerave discussed with patient.    Risks of non-melanoma skin cancer discussed with patient   Return  to clinic 12 months  Patient to follow up with Primary Care provider regarding elevated blood pressure.

## 2018-12-19 NOTE — PROGRESS NOTES
I last saw Bernie in September for multiple issues.  She did have an appointment with dermatology last month with no concerning lesions noted.  She has penicillin and tetracycline allergies (hives).  She has taken Keflex before without side effects.       SUBJECTIVE:   Radha Corona is a 68 year old female who presents to clinic today for the following health issues:    Finger infection       Duration: x 1 week     Description (location/character/radiation): PATIENT noticed a blister/lump on right middle finger and she poked it w/ pin and squeezed out liquid and then put neosporin and band aid.  Seemed to resolve and now has reemerged about 3 days ago.     Intensity:  mild    Accompanying signs and symptoms: red, swollen, pain w/ bending     History (similar episodes/previous evaluation): None    Precipitating or alleviating factors: None    Therapies tried and outcome: none since a week ago w/ pin poke           Problem list and histories reviewed & adjusted, as indicated.  Additional history: as documented    Patient Active Problem List   Diagnosis     Lumbar Radiculopathy- s/p fusion at L4-5.     CTS (carpal tunnel syndrome)     Neuropathy     Cerebral infarction (H)     Osteoarthritis     Arthropathy     Grave's disease     Edema leg     Postablative hypothyroidism     Advanced directives, counseling/discussion     Hyperlipidemia LDL goal <100     Breast cancer (H)     Chronic atrial fibrillation (H)     Generalized osteoarthrosis, unspecified site     HX: breast cancer     Seasonal allergies     Atrial fibrillation [427.31]     Long term current use of anticoagulant therapy     Migraine without status migrainosus, not intractable, unspecified migraine type     Past Surgical History:   Procedure Laterality Date     ARTHROSCOPY KNEE RT/LT  4/2005     BREAST LUMPECTOMY, RT/LT  2/7/06    Right Breast Lumpectomy     C ORAL SURGERY PROCEDURE      wisdom teeth extraction     COLECTOMY      Reversal of Colostomy 8/05      D & C       HC SACROPLASTY  2001    Hx of Spine Surgery L4-5     HYSTERECTOMY, [facility]  5/2005       Social History     Tobacco Use     Smoking status: Never Smoker     Smokeless tobacco: Never Used   Substance Use Topics     Alcohol use: Yes     Comment: wine occasionally     Family History   Problem Relation Age of Onset     Breast Cancer Mother      Diabetes Father      Hypertension Father      Lipids Father      Neurologic Disorder Daughter         Migraines     Neurologic Disorder Brother      Cancer No family hx of         no hx skin cancer     Melanoma No family hx of          Current Outpatient Medications   Medication Sig Dispense Refill     atorvastatin (LIPITOR) 20 MG tablet Take 1 tablet (20 mg) by mouth daily 90 tablet 3     cephALEXin (KEFLEX) 500 MG capsule Take 1 capsule (500 mg) by mouth 4 times daily for 7 days 28 capsule 0     furosemide (LASIX) 40 MG tablet Take 1 tablet (40 mg) by mouth daily as needed (edema) 90 tablet 3     levothyroxine (SYNTHROID/LEVOTHROID) 75 MCG tablet Take 1 tablet (75 mcg) by mouth daily 90 tablet 3     metoprolol tartrate (LOPRESSOR) 50 MG tablet Take 1 tablet (50 mg) by mouth 2 times daily 180 tablet 3     oxybutynin (DITROPAN XL) 10 MG 24 hr tablet Take 1 tablet (10 mg) by mouth daily 90 tablet 3     warfarin (JANTOVEN) 2.5 MG tablet As directed by Anticoagulation Clinic, current dose 2.5 mg Mon, Wed, Fri, 5 mg rest of week 176 tablet 3     acetaminophen-codeine (TYLENOL WITH CODEINE #3) 300-30 MG per tablet Take 1-2 tablets by mouth daily as needed for pain 30 tablet 0     LORazepam (ATIVAN) 1 MG tablet Take 1 tablet (1 mg) by mouth as needed for anxiety Take 30-60 minutes prior to MRI.  Use second dose if needed.  Do not operate a vehicle after taking this medication (Patient not taking: Reported on 11/19/2018) 2 tablet 0     ORDER FOR DME Equipment being ordered: insole Arch supports 1 Box 0     Allergies   Allergen Reactions     Penicillins Hives     Simvastatin  Cramps     Tetracycline Hives       Reviewed and updated as needed this visit by clinical staff  Tobacco  Allergies  Meds  Med Hx  Surg Hx  Fam Hx  Soc Hx      Reviewed and updated as needed this visit by Provider         ROS:  Constitutional, derm systems are negative, except as otherwise noted.    OBJECTIVE:     /76 (BP Location: Right arm, Patient Position: Chair, Cuff Size: Adult Large)   Pulse 73   Temp 97.1  F (36.2  C) (Tympanic)   Resp 16   Wt 105.2 kg (232 lb)   SpO2 98%   BMI 34.26 kg/m    Body mass index is 34.26 kg/m .  GENERAL: healthy, alert and no distress  SKIN: 0.5cm cyst present at the proximal edge of the nail on the R 3rd finger, finger is erythematous to the DIP    Diagnostic Test Results:  Results for orders placed or performed in visit on 12/20/18 (from the past 24 hour(s))   Cyst Culture Aerobic Bacterial   Result Value Ref Range    Specimen Description Finger Cyst     Special Requests Specimen collected in eSwab transport (blue cap)     Culture Micro PENDING    Gram stain   Result Value Ref Range    Specimen Description Finger Cyst     Special Requests Specimen collected in eSwab transport (blue cap)     Gram Stain PENDING        ASSESSMENT/PLAN:       1. Finger infection    Risks and benefits of procedure were discussed, and after obtaining verbal consent, incision and drainage was performed.  The area was cleaned with iodine and she chose not to have it numbed with lidocaine.  Scapel was used to make a 3mm incision over the area and a small amount of gelatinous material was expressed.  Wound culture taken.      Although no purulence was expressed from the cyst, the surrounding finger is rather rather, concerning for cellulitis.  Will initiate treatment with Keflex.  Follow-up on culture.  Follow-up as needed if not improving in a week or new/worsening symptoms develop.       - cephALEXin (KEFLEX) 500 MG capsule; Take 1 capsule (500 mg) by mouth 4 times daily for 7 days   Dispense: 28 capsule; Refill: 0  - Cyst Culture Aerobic Bacterial  - Gram stain  - Puncture (I&D)Drainage of Lesion/Cyst  [[id number]]    Rio Palacios MD  [facility]

## 2018-12-20 ENCOUNTER — OFFICE VISIT (OUTPATIENT)
Dept: FAMILY MEDICINE | Facility: CLINIC | Age: 68
End: 2018-12-20
Payer: COMMERCIAL

## 2018-12-20 VITALS
TEMPERATURE: 97.1 F | RESPIRATION RATE: 16 BRPM | BODY MASS INDEX: 34.26 KG/M2 | OXYGEN SATURATION: 98 % | SYSTOLIC BLOOD PRESSURE: 120 MMHG | WEIGHT: 232 LBS | DIASTOLIC BLOOD PRESSURE: 76 MMHG | HEART RATE: 73 BPM

## 2018-12-20 DIAGNOSIS — L08.9 FINGER INFECTION: Primary | ICD-10-CM

## 2018-12-20 LAB
GRAM STN SPEC: NORMAL
GRAM STN SPEC: NORMAL
Lab: NORMAL
SPECIMEN SOURCE: NORMAL

## 2018-12-20 PROCEDURE — 87205 SMEAR GRAM STAIN: CPT | Performed by: INTERNAL MEDICINE

## 2018-12-20 PROCEDURE — 99213 OFFICE O/P EST LOW 20 MIN: CPT | Mod: 25 | Performed by: INTERNAL MEDICINE

## 2018-12-20 PROCEDURE — 10060 I&D ABSCESS SIMPLE/SINGLE: CPT | Performed by: INTERNAL MEDICINE

## 2018-12-20 PROCEDURE — 87070 CULTURE OTHR SPECIMN AEROBIC: CPT | Performed by: INTERNAL MEDICINE

## 2018-12-20 RX ORDER — CEPHALEXIN 500 MG/1
500 CAPSULE ORAL 4 TIMES DAILY
Qty: 28 CAPSULE | Refills: 0 | Status: SHIPPED | OUTPATIENT
Start: 2018-12-20 | End: 2018-12-27

## 2018-12-21 ENCOUNTER — ANTICOAGULATION THERAPY VISIT (OUTPATIENT)
Dept: ANTICOAGULATION | Facility: CLINIC | Age: 68
End: 2018-12-21
Payer: COMMERCIAL

## 2018-12-21 DIAGNOSIS — Z79.01 LONG TERM CURRENT USE OF ANTICOAGULANT THERAPY: ICD-10-CM

## 2018-12-21 DIAGNOSIS — I48.91 ATRIAL FIBRILLATION (H): ICD-10-CM

## 2018-12-21 DIAGNOSIS — I63.9 CEREBRAL INFARCTION (H): ICD-10-CM

## 2018-12-21 LAB — INR POINT OF CARE: 2.4 (ref 0.86–1.14)

## 2018-12-21 PROCEDURE — 99207 ZZC NO CHARGE NURSE ONLY: CPT

## 2018-12-21 PROCEDURE — 85610 PROTHROMBIN TIME: CPT | Mod: QW

## 2018-12-21 PROCEDURE — 36416 COLLJ CAPILLARY BLOOD SPEC: CPT

## 2018-12-21 NOTE — PROGRESS NOTES
ANTICOAGULATION FOLLOW-UP CLINIC VISIT    Patient Name:  Radha Corona  Date:  2018  Contact Type:  Face to Face    SUBJECTIVE:     Patient Findings     Positives:   No Problem Findings    Comments:   No changes in medications, activity, or diet noted. No bleeding or increased bruising noted. Took warfarin as prescribed.  Patient will continue weekly maintenance dose. INR is therapeutic.   Recheck in 6 weeks.   Patient will be in [place]. Order form provided.              OBJECTIVE    INR Protime   Date Value Ref Range Status   2018 2.4 (A) 0.86 - 1.14 Final       ASSESSMENT / PLAN  INR assessment THER    Recheck INR In: 6 WEEKS    INR Location Clinic      Anticoagulation Summary  As of 2018    INR goal:   2.0-3.0   TTR:   87.0 % (3.7 y)   INR used for dosin.4 (2018)   Warfarin maintenance plan:   2.5 mg (2.5 mg x 1) every Mon, Wed, Fri; 5 mg (2.5 mg x 2) all other days   Full warfarin instructions:   2.5 mg every Mon, Wed, Fri; 5 mg all other days   Weekly warfarin total:   27.5 mg   No change documented:   Sher Tadeo, RN   Plan last modified:   Melissa Mcmillan, RN (3/24/2015)   Next INR check:   2019   Priority:   INR   Target end date:   Indefinite    Indications    Cerebral infarction (H) [I63.9]  Atrial fibrillation [427.31] [I48.91]  Long term current use of anticoagulant therapy [Z79.01]             Anticoagulation Episode Summary     INR check location:       Preferred lab:       Send INR reminders to:   [facility]    Comments:   * 2.5mg tablets. Leaving for [place] in Dec - May: will need lab order      Anticoagulation Care Providers     Provider Role Specialty Phone number    Myrna Staples APRN CNP Responsible Nurse Practitioner [phone]            See the Encounter Report to view Anticoagulation Flowsheet and Dosing Calendar (Go to Encounters tab in chart review, and find the Anticoagulation Therapy Visit)        Sher Tadeo  RN

## 2018-12-23 LAB
BACTERIA SPEC CULT: ABNORMAL
Lab: ABNORMAL
SPECIMEN SOURCE: ABNORMAL

## 2018-12-24 ENCOUNTER — NURSE TRIAGE (OUTPATIENT)
Dept: NURSING | Facility: CLINIC | Age: 68
End: 2018-12-24

## 2018-12-24 LAB — INR PPP: NORMAL (ref 0.86–1.14)

## 2018-12-24 NOTE — TELEPHONE ENCOUNTER
Patient calling for message. Given message about culture and antibiotic from Dr. Jones to patient. No other questions.  Mahsa Blanco RN  [facility]

## 2019-01-14 ENCOUNTER — TELEPHONE (OUTPATIENT)
Dept: FAMILY MEDICINE | Facility: CLINIC | Age: 69
End: 2019-01-14

## 2019-01-14 NOTE — TELEPHONE ENCOUNTER
Patient is called and told of the need to be seen down in [place] if her infection is coming back. Omaira BROCK RN

## 2019-03-07 NOTE — MR AVS SNAPSHOT
Radha Corona   5/3/2017 9:00 AM   Anticoagulation Therapy Visit    Description:  67 year old female   Provider:  [facility]   Department:  [facility]           INR as of 5/3/2017     Today's INR 2.9      Anticoagulation Summary as of 5/3/2017     INR goal 2.0-3.0   Today's INR 2.9   Full instructions 2.5 mg on Mon, Wed, Fri; 5 mg all other days   Next INR check 6/28/2017    Indications   Cerebral infarction (H) [I63.9]  Atrial fibrillation [427.31] [I48.91]  Long term current use of anticoagulant therapy [Z79.01]         Contact Numbers     Please call [phone] to cancel and/or reschedule your appointment.  Please call [phone] with any problems or questions regarding your therapy          May 2017 Details    Sun Mon Tue Wed Thu Fri Sat      1               2               3      2.5 mg   See details      4      5 mg         5      2.5 mg         6      5 mg           7      5 mg         8      2.5 mg         9      5 mg         10      2.5 mg         11      5 mg         12      2.5 mg         13      5 mg           14      5 mg         15      2.5 mg         16      5 mg         17      2.5 mg         18      5 mg         19      2.5 mg         20      5 mg           21      5 mg         22      2.5 mg         23      5 mg         24      2.5 mg         25      5 mg         26      2.5 mg         27      5 mg           28      5 mg         29      2.5 mg         30      5 mg         31      2.5 mg             Date Details   05/03 This INR check               How to take your warfarin dose     To take:  2.5 mg Take 1 of the 2.5 mg tablets.    To take:  5 mg Take 2 of the 2.5 mg tablets.           June 2017 Details    Sun Mon Tue Wed Thu Fri Sat         1      5 mg         2      2.5 mg         3      5 mg           4      5 mg         5      2.5 mg         6      5 mg         7      2.5 mg         8      5 mg         9      2.5 mg         10      5 mg           11      5 mg         12      2.5  Identified pt with two pt identifiers(name and ). Chief Complaint   Patient presents with    New Patient     from Dr Peres Catching Maintenance Due   Topic    DTaP/Tdap/Td series (1 - Tdap)    Shingrix Vaccine Age 50> (1 of 2)    GLAUCOMA SCREENING Q2Y     Bone Densitometry (Dexa) Screening     Pneumococcal 65+ Low/Medium Risk (1 of 2 - PCV13)    MEDICARE YEARLY EXAM        Wt Readings from Last 3 Encounters:   19 199 lb 6.4 oz (90.4 kg)   19 199 lb (90.3 kg)   19 199 lb (90.3 kg)     Temp Readings from Last 3 Encounters:   19 99 °F (37.2 °C)   19 97.4 °F (36.3 °C) (Oral)   10/18/18 96.7 °F (35.9 °C) (Oral)     BP Readings from Last 3 Encounters:   19 184/84   19 153/65   10/18/18 175/73     Pulse Readings from Last 3 Encounters:   19 99   19 82   10/18/18 87         Learning Assessment:  :     Learning Assessment 2019   PRIMARY LEARNER Patient   PRIMARY LANGUAGE ENGLISH   LEARNER PREFERENCE PRIMARY DEMONSTRATION   ANSWERED BY patient   RELATIONSHIP SELF       Depression Screening:  :     3 most recent PHQ Screens 3/7/2019   Little interest or pleasure in doing things Not at all   Feeling down, depressed, irritable, or hopeless Not at all   Total Score PHQ 2 0       Fall Risk Assessment:  :     Fall Risk Assessment, last 12 mths 3/7/2019   Able to walk? Yes   Fall in past 12 months? No       Abuse Screening:  :     Abuse Screening Questionnaire 3/7/2019 8/3/2018   Do you ever feel afraid of your partner? N N   Are you in a relationship with someone who physically or mentally threatens you? N N   Is it safe for you to go home? Y Y       Coordination of Care Questionnaire:  :     1) Have you been to an emergency room, urgent care clinic since your last visit?  yes ED [facility] [place] 19 URI  Hospitalized since your last visit? no             2) Have you seen or consulted any other health care providers outside of [address] [place] [address] since your last visit? yes  Dr Dread Bernal, Dr Shannon Padron (Include any pap smears or colon screenings in this section.)    3) Do you have an Advance Directive on file? No she has her own  Are you interested in receiving information about Advance Directives? no    Reviewed record in preparation for visit and have obtained necessary documentation. Medication reconciliation up to date and corrected with patient at this time. mg         13      5 mg         14      2.5 mg         15      5 mg         16      2.5 mg         17      5 mg           18      5 mg         19      2.5 mg         20      5 mg         21      2.5 mg         22      5 mg         23      2.5 mg         24      5 mg           25      5 mg         26      2.5 mg         27      5 mg         28            29               30                 Date Details   No additional details    Date of next INR:  6/28/2017         How to take your warfarin dose     To take:  2.5 mg Take 1 of the 2.5 mg tablets.    To take:  5 mg Take 2 of the 2.5 mg tablets.

## 2019-03-08 ENCOUNTER — TELEPHONE (OUTPATIENT)
Dept: ANTICOAGULATION | Facility: CLINIC | Age: 69
End: 2019-03-08

## 2019-03-08 NOTE — TELEPHONE ENCOUNTER
03/08/19    Writer attempted to contact the patient. Her INR is overdue. Writer was unable to connect with the patient and her VM was full.     Sher Tadeo RN, BSN, PHN  Anticoagulation Clinic   [phone]

## 2019-03-22 ENCOUNTER — TELEPHONE (OUTPATIENT)
Dept: ANTICOAGULATION | Facility: CLINIC | Age: 69
End: 2019-03-22

## 2019-03-22 NOTE — TELEPHONE ENCOUNTER
03/22/19    Writer left  requesting call back to [facility]. Patient is overdue for an INR.    Sher Tadeo RN, BSN, N  Anticoagulation Clinic   [phone]

## 2019-05-08 ENCOUNTER — ANTICOAGULATION THERAPY VISIT (OUTPATIENT)
Dept: ANTICOAGULATION | Facility: CLINIC | Age: 69
End: 2019-05-08

## 2019-05-08 DIAGNOSIS — Z79.01 LONG TERM CURRENT USE OF ANTICOAGULANT THERAPY: ICD-10-CM

## 2019-05-08 DIAGNOSIS — I63.9 CEREBRAL INFARCTION (H): ICD-10-CM

## 2019-05-08 DIAGNOSIS — I48.91 ATRIAL FIBRILLATION (H): ICD-10-CM

## 2019-05-08 LAB — INR POINT OF CARE: 2.4 (ref 0.86–1.14)

## 2019-05-08 PROCEDURE — 85610 PROTHROMBIN TIME: CPT | Mod: QW

## 2019-05-08 PROCEDURE — 99207 ZZC NO CHARGE NURSE ONLY: CPT

## 2019-05-08 PROCEDURE — 36416 COLLJ CAPILLARY BLOOD SPEC: CPT

## 2019-05-08 NOTE — PROGRESS NOTES
19    Patient had contacted [facility] on  requesting an INR order form to be sent to her as she is out of town. Next INR will be faxed from an external lab.    Sher Tadeo RN, BSN, PHN  Anticoagulation Clinic   [phone]        ANTICOAGULATION FOLLOW-UP CLINIC VISIT    Patient Name:  Radha Corona  Date:  2019  Contact Type:  Face to Face    SUBJECTIVE:     Patient Findings     Comments:   Patient reports no changes in medication, activity, or diet. Patient reports has taken warfarin as instructed, no missed doses. Patient reports no abnormal bruising or bleeding and no signs or symptoms of a blood clot. Will plan to continue maintenance dose and recheck INR in 6 weeks-patient declined for writer to make her an appt at this time, she states she will call and schedule. Patient to call [facility] with any changes or concerns. Patient in agreement to plan. Patient verbalized understanding of all instructions, denies questions or concerns at this time.              OBJECTIVE    INR Protime   Date Value Ref Range Status   2019 2.4 (A) 0.86 - 1.14 Final       ASSESSMENT / PLAN  INR assessment THER    Recheck INR In: 6 WEEKS    INR Location Clinic      Anticoagulation Summary  As of 2019    INR goal:   2.0-3.0   TTR:   88.2 % (4.1 y)   INR used for dosin.4 (2019)   Warfarin maintenance plan:   2.5 mg (2.5 mg x 1) every Mon, Wed, Fri; 5 mg (2.5 mg x 2) all other days   Full warfarin instructions:   2.5 mg every Mon, Wed, Fri; 5 mg all other days   Weekly warfarin total:   27.5 mg   No change documented:   Alycia Daley RN   Plan last modified:   Melissa Mcmillan RN (3/24/2015)   Next INR check:   2019   Priority:   INR   Target end date:   Indefinite    Indications    Cerebral infarction (H) [I63.9]  Atrial fibrillation [427.31] [I48.91]  Long term current use of anticoagulant therapy [Z79.01]             Anticoagulation Episode Summary     INR check location:       Preferred lab:        Send INR reminders to:   [facility]    Comments:   * 2.5mg tablets. Leaving for [place] in Dec - May: will need lab order      Anticoagulation Care Providers     Provider Role Specialty Phone number    Myrna Staples APRN CNP Responsible Nurse Practitioner [phone]            See the Encounter Report to view Anticoagulation Flowsheet and Dosing Calendar (Go to Encounters tab in chart review, and find the Anticoagulation Therapy Visit)      Alycia Daley RN

## 2019-07-09 ENCOUNTER — TELEPHONE (OUTPATIENT)
Dept: FAMILY MEDICINE | Facility: CLINIC | Age: 69
End: 2019-07-09

## 2019-07-09 DIAGNOSIS — I63.9 CEREBRAL INFARCT (H): ICD-10-CM

## 2019-07-09 DIAGNOSIS — I48.91 A-FIB (H): Primary | ICD-10-CM

## 2019-07-09 NOTE — TELEPHONE ENCOUNTER
Reason for Call: Request for an order or referral:    Order or referral being requested: Patient is not in town and is taking care of family is unable to come do a INR check patient would like a order mailed to her.   [id number] [place] [address]    Date needed: at your convenience    Has the patient been seen by the PCP for this problem? Not Applicable      Phone number Patient can be reached at:  Cell number on file:    Telephone Information:   Mobile [phone]       Best Time:  any    Can we leave a detailed message on this number?  YES    Call taken on 7/9/2019 at 10:03 AM by Michelle Perez

## 2019-07-17 ENCOUNTER — TELEPHONE (OUTPATIENT)
Dept: ANTICOAGULATION | Facility: CLINIC | Age: 69
End: 2019-07-17

## 2019-07-17 NOTE — TELEPHONE ENCOUNTER
Patient is due for an INR check, writer called to verify she did not go into a lab and [facility] just missed the results.     Patient states she is caring for her mom who is on hospice (out of state). She has not had the opportunity to go into the hospital to have her INR drawn. She reports all is going well, no indications of bruising or bleeding. She has taken her warfarin as instructed, no missed doses.     Writer advised patient to call our clinic should she have her INR drawn and she does not hear from us within 24-48 hours. If we do not call, we did not receive the results. Based on the conversation, patient indicated this was not a priority at this time and if she happens to have time she will go into the lab.    Mic MERRITT RN, CACP 7/17/2019 at 2:38 PM

## 2019-08-13 ENCOUNTER — ANTICOAGULATION THERAPY VISIT (OUTPATIENT)
Dept: ANTICOAGULATION | Facility: CLINIC | Age: 69
End: 2019-08-13
Payer: COMMERCIAL

## 2019-08-13 DIAGNOSIS — I63.9 CEREBRAL INFARCTION (H): ICD-10-CM

## 2019-08-13 DIAGNOSIS — I48.91 ATRIAL FIBRILLATION (H): ICD-10-CM

## 2019-08-13 DIAGNOSIS — Z79.01 LONG TERM CURRENT USE OF ANTICOAGULANT THERAPY: ICD-10-CM

## 2019-08-13 LAB — INR POINT OF CARE: 2.7 (ref 0.86–1.14)

## 2019-08-13 PROCEDURE — 85610 PROTHROMBIN TIME: CPT | Mod: QW

## 2019-08-13 PROCEDURE — 99207 ZZC NO CHARGE NURSE ONLY: CPT

## 2019-08-13 PROCEDURE — 36416 COLLJ CAPILLARY BLOOD SPEC: CPT

## 2019-08-13 NOTE — PROGRESS NOTES
ANTICOAGULATION FOLLOW-UP CLINIC VISIT    Patient Name:  Radha Corona  Date:  2019  Contact Type:  Face to Face    SUBJECTIVE:  Patient Findings     Comments:   Patient reports no changes in medication, activity, or diet. Patient reports no changes in health. Patient reports has taken warfarin as instructed. Patient reports no increased bruising or bleeding and no signs or symptoms of a blood clot. Will plan to continue maintenance dose and recheck INR in 6 weeks, pt refused to have writer make appt at this time.         Clinical Outcomes     Negatives:   Major bleeding event, Thromboembolic event, Anticoagulation-related hospital admission, Anticoagulation-related ED visit, Anticoagulation-related fatality    Comments:   Patient reports no changes in medication, activity, or diet. Patient reports no changes in health. Patient reports has taken warfarin as instructed. Patient reports no increased bruising or bleeding and no signs or symptoms of a blood clot. Will plan to continue maintenance dose and recheck INR in 6 weeks, pt refused to have writer make appt at this time.            OBJECTIVE    INR Protime   Date Value Ref Range Status   2019 2.7 (A) 0.86 - 1.14 Final       ASSESSMENT / PLAN  INR assessment THER    Recheck INR In: 6 WEEKS    INR Location Clinic      Anticoagulation Summary  As of 2019    INR goal:   2.0-3.0   TTR:   88.9 % (4.4 y)   INR used for dosin.7 (2019)   Warfarin maintenance plan:   2.5 mg (2.5 mg x 1) every Mon, Wed, Fri; 5 mg (2.5 mg x 2) all other days   Full warfarin instructions:   2.5 mg every Mon, Wed, Fri; 5 mg all other days   Weekly warfarin total:   27.5 mg   No change documented:   Alycia Daley RN   Plan last modified:   Melissa Mcmillan, RN (3/24/2015)   Next INR check:   2019   Priority:   INR   Target end date:   Indefinite    Indications    Cerebral infarction (H) [I63.9]  Atrial fibrillation [427.31] [I48.91]  Long term current  use of anticoagulant therapy [Z79.01]             Anticoagulation Episode Summary     INR check location:       Preferred lab:       Send INR reminders to:   [place] PHONE ANTICOAG POOL    Comments:   * 2.5mg tablets. Leaving for [place] in Dec - May: will need lab order      Anticoagulation Care Providers     Provider Role Specialty Phone number    Myrna Staples APRN CNP Responsible Nurse Practitioner [phone]            See the Encounter Report to view Anticoagulation Flowsheet and Dosing Calendar (Go to Encounters tab in chart review, and find the Anticoagulation Therapy Visit)      Alycia Daley RN

## 2019-08-15 ENCOUNTER — OFFICE VISIT (OUTPATIENT)
Dept: FAMILY MEDICINE | Facility: CLINIC | Age: 69
End: 2019-08-15
Payer: COMMERCIAL

## 2019-08-15 VITALS
BODY MASS INDEX: 36.7 KG/M2 | WEIGHT: 233.8 LBS | HEIGHT: 67 IN | OXYGEN SATURATION: 98 % | SYSTOLIC BLOOD PRESSURE: 108 MMHG | DIASTOLIC BLOOD PRESSURE: 70 MMHG | RESPIRATION RATE: 16 BRPM | TEMPERATURE: 96.8 F | HEART RATE: 63 BPM

## 2019-08-15 DIAGNOSIS — I48.0 PAROXYSMAL ATRIAL FIBRILLATION (H): ICD-10-CM

## 2019-08-15 DIAGNOSIS — G43.909 MIGRAINE WITHOUT STATUS MIGRAINOSUS, NOT INTRACTABLE, UNSPECIFIED MIGRAINE TYPE: ICD-10-CM

## 2019-08-15 DIAGNOSIS — N32.81 OVERACTIVE BLADDER: ICD-10-CM

## 2019-08-15 DIAGNOSIS — M25.562 CHRONIC PAIN OF LEFT KNEE: ICD-10-CM

## 2019-08-15 DIAGNOSIS — Z00.00 ENCOUNTER FOR MEDICARE ANNUAL WELLNESS EXAM: Primary | ICD-10-CM

## 2019-08-15 DIAGNOSIS — R60.0 LOCALIZED EDEMA: ICD-10-CM

## 2019-08-15 DIAGNOSIS — Z12.11 SPECIAL SCREENING FOR MALIGNANT NEOPLASMS, COLON: ICD-10-CM

## 2019-08-15 DIAGNOSIS — E66.01 MORBID OBESITY (H): ICD-10-CM

## 2019-08-15 DIAGNOSIS — E03.9 HYPOTHYROIDISM, UNSPECIFIED TYPE: ICD-10-CM

## 2019-08-15 DIAGNOSIS — Z23 NEED FOR VACCINATION: ICD-10-CM

## 2019-08-15 DIAGNOSIS — E78.5 HYPERLIPIDEMIA LDL GOAL <70: ICD-10-CM

## 2019-08-15 DIAGNOSIS — M67.40 MUCOID CYST OF JOINT: ICD-10-CM

## 2019-08-15 DIAGNOSIS — G89.29 CHRONIC PAIN OF LEFT KNEE: ICD-10-CM

## 2019-08-15 LAB
ALT SERPL W P-5'-P-CCNC: 32 U/L (ref 0–50)
ANION GAP SERPL CALCULATED.3IONS-SCNC: 5 MMOL/L (ref 3–14)
BUN SERPL-MCNC: 20 MG/DL (ref 7–30)
CALCIUM SERPL-MCNC: 9.4 MG/DL (ref 8.5–10.1)
CHLORIDE SERPL-SCNC: 105 MMOL/L (ref 94–109)
CHOLEST SERPL-MCNC: 123 MG/DL
CO2 SERPL-SCNC: 29 MMOL/L (ref 20–32)
CREAT SERPL-MCNC: 0.89 MG/DL (ref 0.52–1.04)
GFR SERPL CREATININE-BSD FRML MDRD: 67 ML/MIN/{1.73_M2}
GLUCOSE SERPL-MCNC: 94 MG/DL (ref 70–99)
HDLC SERPL-MCNC: 46 MG/DL
LDLC SERPL CALC-MCNC: 51 MG/DL
NONHDLC SERPL-MCNC: 77 MG/DL
POTASSIUM SERPL-SCNC: 4.7 MMOL/L (ref 3.4–5.3)
SODIUM SERPL-SCNC: 139 MMOL/L (ref 133–144)
T4 FREE SERPL-MCNC: 0.94 NG/DL (ref 0.76–1.46)
TRIGL SERPL-MCNC: 130 MG/DL
TSH SERPL DL<=0.005 MIU/L-ACNC: 5.34 MU/L (ref 0.4–4)

## 2019-08-15 PROCEDURE — 80048 BASIC METABOLIC PNL TOTAL CA: CPT | Performed by: NURSE PRACTITIONER

## 2019-08-15 PROCEDURE — 84439 ASSAY OF FREE THYROXINE: CPT | Performed by: NURSE PRACTITIONER

## 2019-08-15 PROCEDURE — 99213 OFFICE O/P EST LOW 20 MIN: CPT | Mod: 25 | Performed by: NURSE PRACTITIONER

## 2019-08-15 PROCEDURE — 84460 ALANINE AMINO (ALT) (SGPT): CPT | Performed by: NURSE PRACTITIONER

## 2019-08-15 PROCEDURE — 90732 PPSV23 VACC 2 YRS+ SUBQ/IM: CPT | Performed by: NURSE PRACTITIONER

## 2019-08-15 PROCEDURE — 84443 ASSAY THYROID STIM HORMONE: CPT | Performed by: NURSE PRACTITIONER

## 2019-08-15 PROCEDURE — 36415 COLL VENOUS BLD VENIPUNCTURE: CPT | Performed by: NURSE PRACTITIONER

## 2019-08-15 PROCEDURE — 99397 PER PM REEVAL EST PAT 65+ YR: CPT | Mod: 25 | Performed by: NURSE PRACTITIONER

## 2019-08-15 PROCEDURE — G0009 ADMIN PNEUMOCOCCAL VACCINE: HCPCS | Performed by: NURSE PRACTITIONER

## 2019-08-15 PROCEDURE — 80061 LIPID PANEL: CPT | Performed by: NURSE PRACTITIONER

## 2019-08-15 RX ORDER — METOPROLOL TARTRATE 50 MG
50 TABLET ORAL 2 TIMES DAILY
Qty: 180 TABLET | Refills: 3 | Status: SHIPPED | OUTPATIENT
Start: 2019-08-15 | End: 2020-08-12

## 2019-08-15 RX ORDER — FUROSEMIDE 40 MG
40 TABLET ORAL DAILY PRN
Qty: 90 TABLET | Refills: 3 | Status: SHIPPED | OUTPATIENT
Start: 2019-08-15 | End: 2021-04-21

## 2019-08-15 RX ORDER — ATORVASTATIN CALCIUM 20 MG/1
20 TABLET, FILM COATED ORAL DAILY
Qty: 90 TABLET | Refills: 3 | Status: SHIPPED | OUTPATIENT
Start: 2019-08-15 | End: 2020-05-28

## 2019-08-15 RX ORDER — OXYBUTYNIN CHLORIDE 10 MG/1
10 TABLET, EXTENDED RELEASE ORAL DAILY
Qty: 90 TABLET | Refills: 3 | Status: ON HOLD | OUTPATIENT
Start: 2019-08-15 | End: 2023-07-24

## 2019-08-15 RX ORDER — LEVOTHYROXINE SODIUM 75 UG/1
75 TABLET ORAL DAILY
Qty: 90 TABLET | Refills: 3 | Status: SHIPPED | OUTPATIENT
Start: 2019-08-15 | End: 2020-08-12

## 2019-08-15 RX ORDER — ACETAMINOPHEN AND CODEINE PHOSPHATE 300; 30 MG/1; MG/1
1-2 TABLET ORAL DAILY PRN
Qty: 30 TABLET | Refills: 0 | Status: SHIPPED | OUTPATIENT
Start: 2019-08-15 | End: 2019-10-20

## 2019-08-15 ASSESSMENT — MIFFLIN-ST. JEOR: SCORE: 1628.14

## 2019-08-15 NOTE — PROGRESS NOTES
"  SUBJECTIVE:   Radha Corona is a 69 year old female who presents for Preventive Visit.  Chief Complaint   Patient presents with     Wellness Visit     Wellness Visit- Renew [facility], Patient is Fasting for Labs     Health Maintenance     Due for colonoscopy - Takes Coumadin      Imm/Inj     Pneumovax 23 today  , -will check with insurance about shingrix      Are you in the first 12 months of your Medicare Part B coverage?  No    Physical Health:    In general, how would you rate your overall physical health? good    Outside of work, how many days during the week do you exercise? none    Outside of work, approximately how many minutes a day do you exercise?not applicable    If you drink alcohol do you typically have >3 drinks per day or >7 drinks per week? No    Do you usually eat at least 4 servings of fruit and vegetables a day, include whole grains & fiber and avoid regularly eating high fat or \"junk\" foods? NO    Do you have any problems taking medications regularly?  No    Do you have any side effects from medications? none    Needs assistance for the following daily activities: no assistance needed    Which of the following safety concerns are present in your home?  throw rugs in the hallway     Hearing impairment: No    In the past 6 months, have you been bothered by leaking of urine? yes    Mental Health:    In general, how would you rate your overall mental or emotional health? good  PHQ-2 Score:      Do you feel safe in your environment? Yes    Do you have a Health Care Directive? - wants to update hers, copies given to bring home and fill out     Additional concerns to address?  YES , Refill Meds, Due for colonoscopy - On Coumadin,, Pneumovax, Fasting for labs     Fall risk:  Fallen 2 or more times in the past year?: No  Any fall with injury in the past year?: No    Cognitive Screenin) Repeat 3 items (Leader, Season, Table)    2) Clock draw: NORMAL  3) 3 item recall: Recalls 2 objects   Results: " NORMAL clock, 1-2 items recalled: COGNITIVE IMPAIRMENT LESS LIKELY    Mini-CogTM Copyright S Niko. Licensed by the author for use in [facility]; reprinted with permission (leonela@[facility]). All rights reserved.      No daytime sleepiness, drowsiness or FELIPA.             PROBLEMS TO ADD ON...    Hyperlipidemia- tolerating statin  History of CVA- on statin and warfarin  Left knee pain- ongoing X 1 year- previously saw ortho and was recommended steroid injection- patient did not have done- she continues to have pain with putting on shoes/crossing her legs/going up and down stairs. Patient would like a second opinion.   Right hand middle finger- area below cuticle swells and leaks sticky fluid.   Migraine headaches- controlled with Tylenol #3  Thyroid: no new symptoms - tolerating thyroid medication.   Edema in legs- worse if she does not take her lasix.   Overactive bladder- controlled on current medication.       Reviewed and updated as needed this visit by clinical staff  Tobacco  Allergies  Meds  Med Hx  Surg Hx  Fam Hx  Soc Hx        Reviewed and updated as needed this visit by Provider        Social History     Tobacco Use     Smoking status: Never Smoker     Smokeless tobacco: Never Used   Substance Use Topics     Alcohol use: Yes     Comment: wine occasionally                           Current providers sharing in care for this patient include:   Patient Care Team:  Myrna Staples APRN CNP as PCP - General (Nurse Practitioner - Family)  R[place] MD as Assigned PCP    The following health maintenance items are reviewed in Epic and correct as of today:  Health Maintenance   Topic Date Due     DEXA  1950     URINE DRUG SCREEN  1950     MIGRAINE ACTION PLAN  1950     COLONOSCOPY  11/06/2016     ZOSTER IMMUNIZATION (2 of 3) 12/01/2016     MEDICARE ANNUAL WELLNESS VISIT  03/24/2017     ADVANCE CARE PLANNING  05/01/2017     PHQ-2  01/01/2019     INFLUENZA VACCINE (1)  "09/01/2019     FALL RISK ASSESSMENT  09/19/2019     MAMMO SCREENING  09/12/2020     LIPID  12/08/2022     DTAP/TDAP/TD IMMUNIZATION (2 - Td) 07/24/2025     HEPATITIS C SCREENING  Completed     IPV IMMUNIZATION  Aged Out     MENINGITIS IMMUNIZATION  Aged Out     BP Readings from Last 3 Encounters:   08/15/19 108/70   12/20/18 120/76   11/19/18 124/71    Wt Readings from Last 3 Encounters:   08/15/19 106.1 kg (233 lb 12.8 oz)   12/20/18 105.2 kg (232 lb)   10/09/18 104.3 kg (230 lb)                  Pneumonia Vaccine:Adults age 65+ who received Pneumovax (PPSV23) at 65 years or older: Should be given PCV13 > 1 year after their most recent PPSV23    ROS:  Constitutional, HEENT, cardiovascular, pulmonary, GI, , musculoskeletal, neuro, skin, endocrine and psych systems are negative, except as otherwise noted.    OBJECTIVE:   /70 (BP Location: Left arm, Patient Position: Chair, Cuff Size: Adult Large)   Pulse 63   Temp 96.8  F (36  C) (Tympanic)   Resp 16   Ht 1.702 m (5' 7\")   Wt 106.1 kg (233 lb 12.8 oz)   SpO2 98%   Breastfeeding? No   BMI 36.62 kg/m   Estimated body mass index is 36.62 kg/m  as calculated from the following:    Height as of this encounter: 1.702 m (5' 7\").    Weight as of this encounter: 106.1 kg (233 lb 12.8 oz).  EXAM:   GENERAL: healthy, alert and no distress  RESP: lungs clear to auscultation - no rales, rhonchi or wheezes  CV: regular rate and rhythm, normal S1 S2, no S3 or S4, no murmur, click or rub, no peripheral edema and peripheral pulses strong  MS: no gross musculoskeletal defects noted, no edema    Diagnostic Test Results:  Labs reviewed in Epic    ASSESSMENT / PLAN:   1. Encounter for Medicare annual wellness exam      2. Migraine without status migrainosus, not intractable, unspecified migraine type  stable  - Refilled acetaminophen-codeine (TYLENOL WITH CODEINE #3) 300-30 MG per tablet; Take 1-2 tablets by mouth daily as needed for pain  Dispense: 30 tablet; Refill: " 0    3. Hyperlipidemia LDL goal <70  Tolerating statin  - atorvastatin (LIPITOR) 20 MG tablet; Take 1 tablet (20 mg) by mouth daily  Dispense: 90 tablet; Refill: 3  - Lipid panel reflex to direct LDL Fasting  - ALT    4. Localized edema  Encouraged to use lasix  - furosemide (LASIX) 40 MG tablet; Take 1 tablet (40 mg) by mouth daily as needed (edema)  Dispense: 90 tablet; Refill: 3  - Basic metabolic panel    5. Hypothyroidism, unspecified type  stable  - levothyroxine (SYNTHROID/LEVOTHROID) 75 MCG tablet; Take 1 tablet (75 mcg) by mouth daily  Dispense: 90 tablet; Refill: 3  - TSH with free T4 reflex    6. Paroxysmal atrial fibrillation (H)  stable  - metoprolol tartrate (LOPRESSOR) 50 MG tablet; Take 1 tablet (50 mg) by mouth 2 times daily  Dispense: 180 tablet; Refill: 3    7. Overactive bladder  stable  - oxybutynin ER (DITROPAN XL) 10 MG 24 hr tablet; Take 1 tablet (10 mg) by mouth daily  Dispense: 90 tablet; Refill: 3    8. Special screening for malignant neoplasms, colon    - GASTROENTEROLOGY ADULT REF PROCEDURE ONLY    9. Morbid obesity (H)  Counseled on diet and exercise    10. Need for vaccination    - Pneumococcal vaccine 23 valent PPSV23  (Pneumovax) [90609]  - 1st  Administration  [63504]    11. Chronic pain of left knee  Patient saw ortho last year- wants 2nd opinion- patient plans to go outside of her network     12. Mucoid cyst of joint      End of Life Planning:  Patient currently has an advanced directive: No.  I have verified the patient's ablity to prepare an advanced directive/make health care decisions.  Literature was provided to assist patient in preparing an advanced directive.    COUNSELING:  Reviewed preventive health counseling, as reflected in patient instructions       Regular exercise       Healthy diet/nutrition       Immunizations    Vaccinated for: Pneumococcal          Estimated body mass index is 36.62 kg/m  as calculated from the following:    Height as of this encounter: 1.702 m  "(5' 7\").    Weight as of this encounter: 106.1 kg (233 lb 12.8 oz).    Weight management plan: Discussed healthy diet and exercise guidelines     reports that she has never smoked. She has never used smokeless tobacco.      Appropriate preventive services were discussed with this patient, including applicable screening as appropriate for cardiovascular disease, diabetes, osteopenia/osteoporosis, and glaucoma.  As appropriate for age/gender, discussed screening for colorectal cancer, prostate cancer, breast cancer, and cervical cancer. Checklist reviewing preventive services available has been given to the patient.    Reviewed patients plan of care and provided an AVS. The Basic Care Plan (routine screening as documented in Health Maintenance) for Radha meets the Care Plan requirement. This Care Plan has been established and reviewed with the Patient.    Counseling Resources:  ATP IV Guidelines  Pooled Cohorts Equation Calculator  Breast Cancer Risk Calculator  FRAX Risk Assessment  ICSI Preventive Guidelines  Dietary Guidelines for Americans, 2010  USDA's MyPlate  ASA Prophylaxis  Lung CA Screening    Patient to schedule mammogram at [facility] per her preference     JANICE Nichole CNP  [facility]  "

## 2019-08-15 NOTE — NURSING NOTE
Screening Questionnaire for Adult Immunization    Are you sick today?   No   Do you have allergies to medications, food, a vaccine component or latex?   No   Have you ever had a serious reaction after receiving a vaccination?   No   Do you have a long-term health problem with heart disease, lung disease, asthma, kidney disease, metabolic disease (e.g. diabetes), anemia, or other blood disorder?   Yes   Do you have cancer, leukemia, HIV/AIDS, or any other immune system problem?   No   In the past 3 months, have you taken medications that affect  your immune system, such as prednisone, other steroids, or anticancer drugs; drugs for the treatment of rheumatoid arthritis, Crohn s disease, or psoriasis; or have you had radiation treatments?   No   Have you had a seizure, or a brain or other nervous system problem?   No   During the past year, have you received a transfusion of blood or blood     products, or been given immune (gamma) globulin or antiviral drug?   No   For women: Are you pregnant or is there a chance you could become        pregnant during the next month?   No   Have you received any vaccinations in the past 4 weeks?   No     Immunization questionnaire answers were all negative.        Per orders of Dr. Staples, injection of PCV23 given by [facility]. Patient instructed to remain in clinic for 15 minutes afterwards, and to report any adverse reaction to me immediately.       Screening performed by [facility] on 8/15/2019 at 10:49 AM.

## 2019-08-15 NOTE — PATIENT INSTRUCTIONS
Patient Education   Personalized Prevention Plan  You are due for the preventive services outlined below.  Your care team is available to assist you in scheduling these services.  If you have already completed any of these items, please share that information with your care team to update in your medical record.  Health Maintenance Due   Topic Date Due     Osteoporosis Screening  1950     URINE DRUG SCREEN  1950     Migraine Action Plan  1950     Colonscopy  11/06/2016     Zoster (Shingles) Vaccine (2 of 3) 12/01/2016     Annual Wellness Visit  03/24/2017     Discuss Advance Care Planning  05/01/2017     PHQ-2  01/01/2019             Thank you for choosing [facility].  You may be receiving an email and/or telephone survey request from [facility] Customer Experience regarding your visit today.  Please take a few minutes to respond to the survey to let us know how we are doing.      If you have questions or concerns, please contact us via [employer] or you can contact your care team at [phone].    Our Clinic hours are:  Monday 6:40 am  to 7:00 pm  Tuesday -Friday 6:40 am to 5:00 pm    The [place] outpatient lab hours are:  Monday - Friday 6:10 am to 4:45 pm  Saturdays 7:00 am to 11:00 am  Appointments are required, call [phone]    If you have clinical questions after hours or would like to schedule an appointment,  call the clinic at [phone].

## 2019-08-15 NOTE — LETTER
[facility]  [address]  [facility] [address]  Phone: [phone]      8/19/2019     Radha Corona  [address] [place] [id number]      Dear Radha:    Thank you for allowing me to participate in your care. Your recent test results were reviewed and listed below.      Results for orders placed or performed in visit on 08/15/19   Basic metabolic panel   Result Value Ref Range    Sodium 139 133 - 144 mmol/L    Potassium 4.7 3.4 - 5.3 mmol/L    Chloride 105 94 - 109 mmol/L    Carbon Dioxide 29 20 - 32 mmol/L    Anion Gap 5 3 - 14 mmol/L    Glucose 94 70 - 99 mg/dL    Urea Nitrogen 20 7 - 30 mg/dL    Creatinine 0.89 0.52 - 1.04 mg/dL    GFR Estimate 67 >60 mL/min/[1.73_m2]    GFR Estimate If Black 78 >60 mL/min/[1.73_m2]    Calcium 9.4 8.5 - 10.1 mg/dL   Lipid panel reflex to direct LDL Fasting   Result Value Ref Range    Cholesterol 123 <200 mg/dL    Triglycerides 130 <150 mg/dL    HDL Cholesterol 46 (L) >49 mg/dL    LDL Cholesterol Calculated 51 <100 mg/dL    Non HDL Cholesterol 77 <130 mg/dL   ALT   Result Value Ref Range    ALT 32 0 - 50 U/L   TSH with free T4 reflex   Result Value Ref Range    TSH 5.34 (H) 0.40 - 4.00 mU/L   T4 free   Result Value Ref Range    T4 Free 0.94 0.76 - 1.46 ng/dL         Thank you for choosing [facility]. As a result, please continue with the treatment plan discussed in the office. Return as discussed or sooner if symptoms worsen or fail to improve.     If you have any further questions or concerns, please do not hesitate to contact us.      Sincerely,      Glenys Staples

## 2019-08-30 ENCOUNTER — TELEPHONE (OUTPATIENT)
Dept: FAMILY MEDICINE | Facility: CLINIC | Age: 69
End: 2019-08-30

## 2019-08-30 NOTE — TELEPHONE ENCOUNTER
Pharmacy technician calling Alyssa, pharmacy technician calling asking if Tylenol #3 is acute or chronic pain. Pharmacy considers chronic to be needing every month. Patient gets 1 prescription of Tylenol 3 # per year, pharmacy considers acute and will only fill for 7 tablets per new federal law.     FYI to provider.

## 2019-09-12 ENCOUNTER — TRANSFERRED RECORDS (OUTPATIENT)
Dept: HEALTH INFORMATION MANAGEMENT | Facility: CLINIC | Age: 69
End: 2019-09-12

## 2019-10-15 ENCOUNTER — ANTICOAGULATION THERAPY VISIT (OUTPATIENT)
Dept: ANTICOAGULATION | Facility: CLINIC | Age: 69
End: 2019-10-15
Payer: COMMERCIAL

## 2019-10-15 DIAGNOSIS — I63.9 CEREBRAL INFARCTION (H): ICD-10-CM

## 2019-10-15 DIAGNOSIS — Z79.01 LONG TERM CURRENT USE OF ANTICOAGULANT THERAPY: ICD-10-CM

## 2019-10-15 DIAGNOSIS — I48.91 ATRIAL FIBRILLATION (H): ICD-10-CM

## 2019-10-15 LAB — INR POINT OF CARE: 2.5 (ref 0.86–1.14)

## 2019-10-15 PROCEDURE — 36416 COLLJ CAPILLARY BLOOD SPEC: CPT

## 2019-10-15 PROCEDURE — 99207 ZZC NO CHARGE NURSE ONLY: CPT

## 2019-10-15 PROCEDURE — 85610 PROTHROMBIN TIME: CPT | Mod: QW

## 2019-10-15 NOTE — PROGRESS NOTES
ANTICOAGULATION FOLLOW-UP CLINIC VISIT    Patient Name:  Radha Corona  Date:  10/15/2019  Contact Type:  Face to Face    SUBJECTIVE:  Patient Findings     Comments:   No changes in diet, activity level, medications (including over the counter), or health. No missed doses of warfarin. Patient took dosing as prescribed. No signs of clots or bleeding concerns. Patient will continue maintenance warfarin dosing.          Clinical Outcomes     Negatives:   Major bleeding event, Thromboembolic event, Anticoagulation-related hospital admission, Anticoagulation-related ED visit, Anticoagulation-related fatality    Comments:   No changes in diet, activity level, medications (including over the counter), or health. No missed doses of warfarin. Patient took dosing as prescribed. No signs of clots or bleeding concerns. Patient will continue maintenance warfarin dosing.             OBJECTIVE    INR Protime   Date Value Ref Range Status   10/15/2019 2.5 (A) 0.86 - 1.14 Final       ASSESSMENT / PLAN  INR assessment THER    Recheck INR In: 6 WEEKS    INR Location Clinic      Anticoagulation Summary  As of 10/15/2019    INR goal:   2.0-3.0   TTR:   89.4 % (4.5 y)   INR used for dosin.5 (10/15/2019)   Warfarin maintenance plan:   2.5 mg (2.5 mg x 1) every Mon, Wed, Fri; 5 mg (2.5 mg x 2) all other days   Full warfarin instructions:   2.5 mg every Mon, Wed, Fri; 5 mg all other days   Weekly warfarin total:   27.5 mg   No change documented:   Mary Ann Meng RN   Plan last modified:   Melissa Mcmillan RN (3/24/2015)   Next INR check:   2019   Priority:   INR   Target end date:   Indefinite    Indications    Cerebral infarction (H) [I63.9]  Atrial fibrillation [427.31] [I48.91]  Long term current use of anticoagulant therapy [Z79.01]             Anticoagulation Episode Summary     INR check location:       Preferred lab:       Send INR reminders to:   LAZARO RAYMOND    Comments:   * 2.5mg tablets. Leaving for [place]  in Dec - May: will need lab order (pt states she will not check when gone, unless she has bruising)      Anticoagulation Care Providers     Provider Role Specialty Phone number    Jhoan, Myrna Leahy, APRN CNP Responsible Nurse Practitioner [phone]            See the Encounter Report to view Anticoagulation Flowsheet and Dosing Calendar (Go to Encounters tab in chart review, and find the Anticoagulation Therapy Visit)        Mary Ann Meng RN

## 2019-10-17 DIAGNOSIS — G43.909 MIGRAINE WITHOUT STATUS MIGRAINOSUS, NOT INTRACTABLE, UNSPECIFIED MIGRAINE TYPE: ICD-10-CM

## 2019-10-17 NOTE — TELEPHONE ENCOUNTER
Requested Prescriptions   Pending Prescriptions Disp Refills     acetaminophen-codeine (TYLENOL WITH CODEINE #3) 300-30 MG per tablet  Last Written Prescription Date:  08/15/19  Last Fill Quantity: 30,  # refills: 0   Last office visit: 8/15/2019 with prescribing provider:  Myrna Staples   Future Office Visit:   Next 5 appointments (look out 90 days)    Oct 21, 2019 10:00 AM CDT  [facility] with Edy Dove [facility]  [facility] [address] [facility] [place] [address]  [phone]          30 tablet 0     Sig: Take 1-2 tablets by mouth daily as needed for pain       There is no refill protocol information for this order        Pt leaving out of town soon.  This has qnty remaining on it but because of the new law starting 7/1/19 we cannot fill it after 30 days,  Need new rx ASAP

## 2019-10-20 RX ORDER — ACETAMINOPHEN AND CODEINE PHOSPHATE 300; 30 MG/1; MG/1
1-2 TABLET ORAL DAILY PRN
Qty: 30 TABLET | Refills: 0 | Status: SHIPPED | OUTPATIENT
Start: 2019-10-20

## 2019-10-21 ENCOUNTER — OFFICE VISIT (OUTPATIENT)
Dept: DERMATOLOGY | Facility: CLINIC | Age: 69
End: 2019-10-21
Payer: COMMERCIAL

## 2019-10-21 VITALS — OXYGEN SATURATION: 98 % | HEART RATE: 79 BPM | SYSTOLIC BLOOD PRESSURE: 129 MMHG | DIASTOLIC BLOOD PRESSURE: 74 MMHG

## 2019-10-21 DIAGNOSIS — L81.4 LENTIGO: ICD-10-CM

## 2019-10-21 DIAGNOSIS — L72.0 EPIDERMAL CYST: ICD-10-CM

## 2019-10-21 DIAGNOSIS — D23.9 DERMAL NEVUS: ICD-10-CM

## 2019-10-21 DIAGNOSIS — L82.1 SEBORRHEIC KERATOSIS: ICD-10-CM

## 2019-10-21 DIAGNOSIS — Z85.828 HISTORY OF SKIN CANCER: Primary | ICD-10-CM

## 2019-10-21 DIAGNOSIS — D18.01 ANGIOMA OF SKIN: ICD-10-CM

## 2019-10-21 DIAGNOSIS — M67.449 DIGITAL MUCINOUS CYST: ICD-10-CM

## 2019-10-21 PROCEDURE — 99213 OFFICE O/P EST LOW 20 MIN: CPT | Performed by: DERMATOLOGY

## 2019-10-21 NOTE — NURSING NOTE
"Initial /74 (BP Location: Left arm, Patient Position: Sitting, Cuff Size: Adult Large)   Pulse 79   SpO2 98%  Estimated body mass index is 36.62 kg/m  as calculated from the following:    Height as of 8/15/19: 1.702 m (5' 7\").    Weight as of 8/15/19: 106.1 kg (233 lb 12.8 oz). .      "

## 2019-10-21 NOTE — LETTER
10/21/2019         RE: Radha Corona  [address] [place] [id number]        Dear Colleague,    Thank you for referring your patient, Radha Corona, to the [facility]. Please see a copy of my visit note below.    Radha Corona is a 69 year old year old female patient here today for spot on back and right 3rd finger.   .  Patient states this has been present for a while.  Patient reports the following symptoms:  tender.  Patient reports the following previous treatments none.  Patient reports the following modifying factors none.  Associated symptoms: none.  Patient has no other skin complaints today.  Remainder of the HPI, Meds, PMH, Allergies, FH, and SH was reviewed in chart.      Past Medical History:   Diagnosis Date     Abscess of intestine 8/3/07    ptl colectomy     Basal cell carcinoma      Displacement of lumbar intervertebral disc without myelopathy 8/3/07     GERD (gastroesophageal reflux disease)      Malignant neoplasm of breast (female), unspecified site 8/3/07     Migraine, unspecified, without mention of intractable migraine without mention of status migrainosus      Other and unspecified hyperlipidemia 8/3/07       Past Surgical History:   Procedure Laterality Date     ARTHROSCOPY KNEE RT/LT  4/2005     BREAST LUMPECTOMY, RT/LT  2/7/06    Right Breast Lumpectomy     C ORAL SURGERY PROCEDURE      wisdom teeth extraction     COLECTOMY      Reversal of Colostomy 8/05     D & C       HC SACROPLASTY  2001    Hx of Spine Surgery L4-5     HYSTERECTOMY, DIOMEDES  5/2005        Family History   Problem Relation Age of Onset     Breast Cancer Mother      Diabetes Father      Hypertension Father      Lipids Father      Neurologic Disorder Daughter         Migraines     Neurologic Disorder Brother      Cancer No family hx of         no hx skin cancer     Melanoma No family hx of        Social History     Socioeconomic History     Marital status:      Spouse  name: Not on file     Number of children: Not on file     Years of education: Not on file     Highest education level: Not on file   Occupational History     Not on file   Social Needs     Financial resource strain: Not on file     Food insecurity:     Worry: Not on file     Inability: Not on file     Transportation needs:     Medical: Not on file     Non-medical: Not on file   Tobacco Use     Smoking status: Never Smoker     Smokeless tobacco: Never Used   Substance and Sexual Activity     Alcohol use: Yes     Comment: wine occasionally     Drug use: No     Sexual activity: Yes     Partners: Male   Lifestyle     Physical activity:     Days per week: Not on file     Minutes per session: Not on file     Stress: Not on file   Relationships     Social connections:     Talks on phone: Not on file     Gets together: Not on file     Attends Voodoo service: Not on file     Active member of club or organization: Not on file     Attends meetings of clubs or organizations: Not on file     Relationship status: Not on file     Intimate partner violence:     Fear of current or ex partner: Not on file     Emotionally abused: Not on file     Physically abused: Not on file     Forced sexual activity: Not on file   Other Topics Concern     Parent/sibling w/ CABG, MI or angioplasty before 65F 55M? No   Social History Narrative     Not on file       Outpatient Encounter Medications as of 10/21/2019   Medication Sig Dispense Refill     acetaminophen-codeine (TYLENOL WITH CODEINE #3) 300-30 MG per tablet Take 1-2 tablets by mouth daily as needed for pain 30 tablet 0     atorvastatin (LIPITOR) 20 MG tablet Take 1 tablet (20 mg) by mouth daily 90 tablet 3     furosemide (LASIX) 40 MG tablet Take 1 tablet (40 mg) by mouth daily as needed (edema) 90 tablet 3     levothyroxine (SYNTHROID/LEVOTHROID) 75 MCG tablet Take 1 tablet (75 mcg) by mouth daily 90 tablet 3     metoprolol tartrate (LOPRESSOR) 50 MG tablet Take 1 tablet (50 mg) by  mouth 2 times daily 180 tablet 3     oxybutynin ER (DITROPAN XL) 10 MG 24 hr tablet Take 1 tablet (10 mg) by mouth daily 90 tablet 3     warfarin ANTICOAGULANT (JANTOVEN ANTICOAGULANT) 2.5 MG tablet TAKE 1 TABLET(2.5MG) BY MOUTH EVERY MONDAY, WEDNESDAY AND FRIDAY. TAKE 2 TABS (5MG) ALL OTHER DAYS OR AS DIRECTED BY ANTICOAGULATION CLIN 145 tablet 1     ORDER FOR DME Equipment being ordered: insole Arch supports (Patient not taking: Reported on 10/21/2019) 1 Box 0     No facility-administered encounter medications on file as of 10/21/2019.              Review Of Systems  Skin: As above  Eyes: negative  Ears/Nose/Throat: negative  Respiratory: No shortness of breath, dyspnea on exertion, cough, or hemoptysis  Cardiovascular: negative  Gastrointestinal: negative  Genitourinary: negative  Musculoskeletal: negative  Neurologic: negative  Psychiatric: negative  Hematologic/Lymphatic/Immunologic: negative  Endocrine: negative      O:   NAD, WDWN, Alert & Oriented, Mood & Affect wnl, Vitals stable   Here today alone   /74 (BP Location: Left arm, Patient Position: Sitting, Cuff Size: Adult Large)   Pulse 79   SpO2 98%    General appearance normal   Vitals stable   Alert, oriented and in no acute distress      Following lymph nodes palpated: Occipital, Cervical, Supraclavicular no lad   R upp erback firm nodule with comedone   R 3rd dip soft clear nodule       Stuck on papules and brown macules on trunk and ext   Red papules on trunk  Flesh colored papules on trunk     The remainder of the full exam was unremarkable; the following areas were examined:  conjunctiva/lids, oral mucosa, neck, peripheral vascular system, abdomen, lymph nodes, digits/nails, eccrine and apocrine glands, scalp/hair, face, neck, chest, abdomen, buttocks, back, RUE, LUE, RLE, LLE       Eyes: Conjunctivae/lids:Normal     ENT: Lips, buccal mucosa, tongue: normal    MSK:Normal    Cardiovascular: peripheral edema none    Pulm: Breathing  Normal    Lymph Nodes: No Head and Neck Lymphadenopathy     Neuro/Psych: Orientation:Normal; Mood/Affect:Normal      A/P:  1. Seborrheic keratosis, lentigo, angioma, dermal nevus, hx of non-melanoma skin cancer  2. Digital mucuous cyst  Schedule excision  3. Back epidemral cyst  shceudle exciison    BENIGN LESIONS DISCUSSED WITH PATIENT:  I discussed the specifics of tumor, prognosis, and genetics of benign lesions.  I explained that treatment of these lesions would be purely cosmetic and not medically neccessary.  I discussed with patient different removal options including excision, cautery and /or laser.      Nature and genetics of benign skin lesions dicussed with patient.  Signs and Symptoms of skin cancer discussed with patient.  Patient encouraged to perform monthly skin exams.  UV precautions reviewed with patient.  Skin care regimen reviewed with patient: Eliminate harsh soaps, i.e. Dial, zest, irsih spring; Mild soaps such as Cetaphil or Dove sensitive skin, avoid hot or cold showers, aggressive use of emollients including vanicream, cetaphil or cerave discussed with patient.    Risks of non-melanoma skin cancer discussed with patient   Return to clinic for excision after vacation      Again, thank you for allowing me to participate in the care of your patient.        Sincerely,        Edy Dove MD

## 2019-10-21 NOTE — PROGRESS NOTES
Radha Corona is a 69 year old year old female patient here today for spot on back and right 3rd finger.   .  Patient states this has been present for a while.  Patient reports the following symptoms:  tender.  Patient reports the following previous treatments none.  Patient reports the following modifying factors none.  Associated symptoms: none.  Patient has no other skin complaints today.  Remainder of the HPI, Meds, PMH, Allergies, FH, and SH was reviewed in chart.      Past Medical History:   Diagnosis Date     Abscess of intestine 8/3/07    ptl colectomy     Basal cell carcinoma      Displacement of lumbar intervertebral disc without myelopathy 8/3/07     GERD (gastroesophageal reflux disease)      Malignant neoplasm of breast (female), unspecified site 8/3/07     Migraine, unspecified, without mention of intractable migraine without mention of status migrainosus      Other and unspecified hyperlipidemia 8/3/07       Past Surgical History:   Procedure Laterality Date     ARTHROSCOPY KNEE RT/LT  4/2005     BREAST LUMPECTOMY, RT/LT  2/7/06    Right Breast Lumpectomy     C ORAL SURGERY PROCEDURE      wisdom teeth extraction     COLECTOMY      Reversal of Colostomy 8/05     D & C       HC SACROPLASTY  2001    Hx of Spine Surgery L4-5     HYSTERECTOMY, DIOMEDES  5/2005        Family History   Problem Relation Age of Onset     Breast Cancer Mother      Diabetes Father      Hypertension Father      Lipids Father      Neurologic Disorder Daughter         Migraines     Neurologic Disorder Brother      Cancer No family hx of         no hx skin cancer     Melanoma No family hx of        Social History     Socioeconomic History     Marital status:      Spouse name: Not on file     Number of children: Not on file     Years of education: Not on file     Highest education level: Not on file   Occupational History     Not on file   Social Needs     Financial resource strain: Not on file     Food insecurity:     Worry:  Not on file     Inability: Not on file     Transportation needs:     Medical: Not on file     Non-medical: Not on file   Tobacco Use     Smoking status: Never Smoker     Smokeless tobacco: Never Used   Substance and Sexual Activity     Alcohol use: Yes     Comment: wine occasionally     Drug use: No     Sexual activity: Yes     Partners: Male   Lifestyle     Physical activity:     Days per week: Not on file     Minutes per session: Not on file     Stress: Not on file   Relationships     Social connections:     Talks on phone: Not on file     Gets together: Not on file     Attends Sikh service: Not on file     Active member of club or organization: Not on file     Attends meetings of clubs or organizations: Not on file     Relationship status: Not on file     Intimate partner violence:     Fear of current or ex partner: Not on file     Emotionally abused: Not on file     Physically abused: Not on file     Forced sexual activity: Not on file   Other Topics Concern     Parent/sibling w/ CABG, MI or angioplasty before 65F 55M? No   Social History Narrative     Not on file       Outpatient Encounter Medications as of 10/21/2019   Medication Sig Dispense Refill     acetaminophen-codeine (TYLENOL WITH CODEINE #3) 300-30 MG per tablet Take 1-2 tablets by mouth daily as needed for pain 30 tablet 0     atorvastatin (LIPITOR) 20 MG tablet Take 1 tablet (20 mg) by mouth daily 90 tablet 3     furosemide (LASIX) 40 MG tablet Take 1 tablet (40 mg) by mouth daily as needed (edema) 90 tablet 3     levothyroxine (SYNTHROID/LEVOTHROID) 75 MCG tablet Take 1 tablet (75 mcg) by mouth daily 90 tablet 3     metoprolol tartrate (LOPRESSOR) 50 MG tablet Take 1 tablet (50 mg) by mouth 2 times daily 180 tablet 3     oxybutynin ER (DITROPAN XL) 10 MG 24 hr tablet Take 1 tablet (10 mg) by mouth daily 90 tablet 3     warfarin ANTICOAGULANT (JANTOVEN ANTICOAGULANT) 2.5 MG tablet TAKE 1 TABLET(2.5MG) BY MOUTH EVERY MONDAY, WEDNESDAY AND  FRIDAY. TAKE 2 TABS (5MG) ALL OTHER DAYS OR AS DIRECTED BY ANTICOAGULATION CLIN 145 tablet 1     ORDER FOR DME Equipment being ordered: insole Arch supports (Patient not taking: Reported on 10/21/2019) 1 Box 0     No facility-administered encounter medications on file as of 10/21/2019.              Review Of Systems  Skin: As above  Eyes: negative  Ears/Nose/Throat: negative  Respiratory: No shortness of breath, dyspnea on exertion, cough, or hemoptysis  Cardiovascular: negative  Gastrointestinal: negative  Genitourinary: negative  Musculoskeletal: negative  Neurologic: negative  Psychiatric: negative  Hematologic/Lymphatic/Immunologic: negative  Endocrine: negative      O:   NAD, WDWN, Alert & Oriented, Mood & Affect wnl, Vitals stable   Here today alone   /74 (BP Location: Left arm, Patient Position: Sitting, Cuff Size: Adult Large)   Pulse 79   SpO2 98%    General appearance normal   Vitals stable   Alert, oriented and in no acute distress      Following lymph nodes palpated: Occipital, Cervical, Supraclavicular no lad   R upp erback firm nodule with comedone   R 3rd dip soft clear nodule       Stuck on papules and brown macules on trunk and ext   Red papules on trunk  Flesh colored papules on trunk     The remainder of the full exam was unremarkable; the following areas were examined:  conjunctiva/lids, oral mucosa, neck, peripheral vascular system, abdomen, lymph nodes, digits/nails, eccrine and apocrine glands, scalp/hair, face, neck, chest, abdomen, buttocks, back, RUE, LUE, RLE, LLE       Eyes: Conjunctivae/lids:Normal     ENT: Lips, buccal mucosa, tongue: normal    MSK:Normal    Cardiovascular: peripheral edema none    Pulm: Breathing Normal    Lymph Nodes: No Head and Neck Lymphadenopathy     Neuro/Psych: Orientation:Normal; Mood/Affect:Normal      A/P:  1. Seborrheic keratosis, lentigo, angioma, dermal nevus, hx of non-melanoma skin cancer  2. Digital mucuous cyst  Schedule excision  3. Back  epidemral cyst  shceudle exciison    BENIGN LESIONS DISCUSSED WITH PATIENT:  I discussed the specifics of tumor, prognosis, and genetics of benign lesions.  I explained that treatment of these lesions would be purely cosmetic and not medically neccessary.  I discussed with patient different removal options including excision, cautery and /or laser.      Nature and genetics of benign skin lesions dicussed with patient.  Signs and Symptoms of skin cancer discussed with patient.  Patient encouraged to perform monthly skin exams.  UV precautions reviewed with patient.  Skin care regimen reviewed with patient: Eliminate harsh soaps, i.e. Dial, zest, irsih spring; Mild soaps such as Cetaphil or Dove sensitive skin, avoid hot or cold showers, aggressive use of emollients including vanicream, cetaphil or cerave discussed with patient.    Risks of non-melanoma skin cancer discussed with patient   Return to clinic for excision after vacation

## 2019-11-19 ENCOUNTER — TELEPHONE (OUTPATIENT)
Dept: DERMATOLOGY | Facility: CLINIC | Age: 69
End: 2019-11-19

## 2019-11-19 DIAGNOSIS — Z79.01 LONG TERM CURRENT USE OF ANTICOAGULANT THERAPY: ICD-10-CM

## 2019-11-19 NOTE — TELEPHONE ENCOUNTER
Reason for Call:  Other appointment    Detailed comments: Pt's sister Marce calling - states she has been seen in Dermatology for this issue.  States cyst on back now seeping, oozing and hurting.  [place] - wondering if she can be worked in for next week?  Has h/o of cancer.    Phone Number Patient can be reached at: Sister Marce [phone]    Best Time:     Can we leave a detailed message on this number? YES    Call taken on 11/19/2019 at 1:52 PM by Melida Chan

## 2019-11-20 NOTE — TELEPHONE ENCOUNTER
Pt would like to be fit into schedule. See pt note and advise.  Marta MILLER RN BSN PHN  Specialty Clinics      LOV 10/21/19  A/P:  1. Seborrheic keratosis, lentigo, angioma, dermal nevus, hx of non-melanoma skin cancer  2. Digital mucuous cyst  Schedule excision  3. Back epidemral cyst  shceudle exciison

## 2019-11-25 ENCOUNTER — OFFICE VISIT (OUTPATIENT)
Dept: DERMATOLOGY | Facility: CLINIC | Age: 69
End: 2019-11-25
Payer: COMMERCIAL

## 2019-11-25 VITALS — SYSTOLIC BLOOD PRESSURE: 137 MMHG | OXYGEN SATURATION: 99 % | DIASTOLIC BLOOD PRESSURE: 86 MMHG | HEART RATE: 81 BPM

## 2019-11-25 DIAGNOSIS — L72.0 RUPTURED EPITHELIAL CYST: Primary | ICD-10-CM

## 2019-11-25 PROCEDURE — 99212 OFFICE O/P EST SF 10 MIN: CPT | Performed by: DERMATOLOGY

## 2019-11-25 NOTE — LETTER
11/25/2019         RE: Radha Corona  [address] [place] [id number]        Dear Colleague,    Thank you for referring your patient, Radha Corona, to the [facility]. Please see a copy of my visit note below.    Radha Corona is a 69 year old year old female patient here today for tender spot on right post shoulder, ruptured and was oozing all healed now. Patient has no other skin complaints today.  Remainder of the HPI, Meds, PMH, Allergies, FH, and SH was reviewed in chart.      Past Medical History:   Diagnosis Date     Abscess of intestine 8/3/07    ptl colectomy     Basal cell carcinoma      Displacement of lumbar intervertebral disc without myelopathy 8/3/07     GERD (gastroesophageal reflux disease)      Malignant neoplasm of breast (female), unspecified site 8/3/07     Migraine, unspecified, without mention of intractable migraine without mention of status migrainosus      Other and unspecified hyperlipidemia 8/3/07       Past Surgical History:   Procedure Laterality Date     ARTHROSCOPY KNEE RT/LT  4/2005     BREAST LUMPECTOMY, RT/LT  2/7/06    Right Breast Lumpectomy     C ORAL SURGERY PROCEDURE      wisdom teeth extraction     COLECTOMY      Reversal of Colostomy 8/05     D & C       HC SACROPLASTY  2001    Hx of Spine Surgery L4-5     HYSTERECTOMY, DIOMEDES  5/2005        Family History   Problem Relation Age of Onset     Breast Cancer Mother      Diabetes Father      Hypertension Father      Lipids Father      Neurologic Disorder Daughter         Migraines     Neurologic Disorder Brother      Cancer No family hx of         no hx skin cancer     Melanoma No family hx of        Social History     Socioeconomic History     Marital status:      Spouse name: Not on file     Number of children: Not on file     Years of education: Not on file     Highest education level: Not on file   Occupational History     Not on file   Social Needs     Financial resource  strain: Not on file     Food insecurity:     Worry: Not on file     Inability: Not on file     Transportation needs:     Medical: Not on file     Non-medical: Not on file   Tobacco Use     Smoking status: Never Smoker     Smokeless tobacco: Never Used   Substance and Sexual Activity     Alcohol use: Yes     Comment: wine occasionally     Drug use: No     Sexual activity: Yes     Partners: Male   Lifestyle     Physical activity:     Days per week: Not on file     Minutes per session: Not on file     Stress: Not on file   Relationships     Social connections:     Talks on phone: Not on file     Gets together: Not on file     Attends Zoroastrian service: Not on file     Active member of club or organization: Not on file     Attends meetings of clubs or organizations: Not on file     Relationship status: Not on file     Intimate partner violence:     Fear of current or ex partner: Not on file     Emotionally abused: Not on file     Physically abused: Not on file     Forced sexual activity: Not on file   Other Topics Concern     Parent/sibling w/ CABG, MI or angioplasty before 65F 55M? No   Social History Narrative     Not on file       Outpatient Encounter Medications as of 11/25/2019   Medication Sig Dispense Refill     acetaminophen-codeine (TYLENOL WITH CODEINE #3) 300-30 MG per tablet Take 1-2 tablets by mouth daily as needed for pain 30 tablet 0     atorvastatin (LIPITOR) 20 MG tablet Take 1 tablet (20 mg) by mouth daily 90 tablet 3     furosemide (LASIX) 40 MG tablet Take 1 tablet (40 mg) by mouth daily as needed (edema) 90 tablet 3     levothyroxine (SYNTHROID/LEVOTHROID) 75 MCG tablet Take 1 tablet (75 mcg) by mouth daily 90 tablet 3     metoprolol tartrate (LOPRESSOR) 50 MG tablet Take 1 tablet (50 mg) by mouth 2 times daily 180 tablet 3     ORDER FOR DME Equipment being ordered: insole Arch supports 1 Box 0     oxybutynin ER (DITROPAN XL) 10 MG 24 hr tablet Take 1 tablet (10 mg) by mouth daily 90 tablet 3      warfarin ANTICOAGULANT (JANTOVEN ANTICOAGULANT) 2.5 MG tablet TAKE 1 TABLET(2.5MG) BY MOUTH EVERY MONDAY, WEDNESDAY AND FRIDAY. TAKE 2 TABS (5MG) ALL OTHER DAYS OR AS DIRECTED BY ANTICOAGULATION CLIN 145 tablet 1     No facility-administered encounter medications on file as of 11/25/2019.              Review Of Systems  Skin: As above  Eyes: negative  Ears/Nose/Throat: negative  Respiratory: No shortness of breath, dyspnea on exertion, cough, or hemoptysis  Cardiovascular: negative  Gastrointestinal: negative  Genitourinary: negative  Musculoskeletal: negative  Neurologic: negative  Psychiatric: negative  Hematologic/Lymphatic/Immunologic: negative  Endocrine: negative      O:   NAD, WDWN, Alert & Oriented, Mood & Affect wnl, Vitals stable   Here today alone   /86   Pulse 81   SpO2 99%    General appearance normal   Vitals stable   Alert, oriented and in no acute distress     Red macule with firm scar onright upper back       Eyes: Conjunctivae/lids:Normal     ENT: Lips, buccal mucosa, tongue: normal    MSK:Normal    Cardiovascular: peripheral edema none    Pulm: Breathing Normal    Neuro/Psych: Orientation:Alert and Orientedx3 ; Mood/Affect:normal       A/P:  1. Ruptured cyst  Excision and recurrence discussed with patient   No cyst to exzcise today  mychart us if cyst recurs  Skin care regimen reviewed with patient: Eliminate harsh soaps, i.e. Dial, zest, irsih spring; Mild soaps such as Cetaphil or Dove sensitive skin, avoid hot or cold showers, aggressive use of emollients including vanicream, cetaphil or cerave discussed with patient.        Again, thank you for allowing me to participate in the care of your patient.        Sincerely,        Edy Dove MD

## 2019-11-25 NOTE — PROGRESS NOTES
Radha Corona is a 69 year old year old female patient here today for tender spot on right post shoulder, ruptured and was oozing all healed now. Patient has no other skin complaints today.  Remainder of the HPI, Meds, PMH, Allergies, FH, and SH was reviewed in chart.      Past Medical History:   Diagnosis Date     Abscess of intestine 8/3/07    ptl colectomy     Basal cell carcinoma      Displacement of lumbar intervertebral disc without myelopathy 8/3/07     GERD (gastroesophageal reflux disease)      Malignant neoplasm of breast (female), unspecified site 8/3/07     Migraine, unspecified, without mention of intractable migraine without mention of status migrainosus      Other and unspecified hyperlipidemia 8/3/07       Past Surgical History:   Procedure Laterality Date     ARTHROSCOPY KNEE RT/LT  4/2005     BREAST LUMPECTOMY, RT/LT  2/7/06    Right Breast Lumpectomy     C ORAL SURGERY PROCEDURE      wisdom teeth extraction     COLECTOMY      Reversal of Colostomy 8/05     D & C       HC SACROPLASTY  2001    Hx of Spine Surgery L4-5     HYSTERECTOMY, DIOMEDES  5/2005        Family History   Problem Relation Age of Onset     Breast Cancer Mother      Diabetes Father      Hypertension Father      Lipids Father      Neurologic Disorder Daughter         Migraines     Neurologic Disorder Brother      Cancer No family hx of         no hx skin cancer     Melanoma No family hx of        Social History     Socioeconomic History     Marital status:      Spouse name: Not on file     Number of children: Not on file     Years of education: Not on file     Highest education level: Not on file   Occupational History     Not on file   Social Needs     Financial resource strain: Not on file     Food insecurity:     Worry: Not on file     Inability: Not on file     Transportation needs:     Medical: Not on file     Non-medical: Not on file   Tobacco Use     Smoking status: Never Smoker     Smokeless tobacco: Never Used    Substance and Sexual Activity     Alcohol use: Yes     Comment: wine occasionally     Drug use: No     Sexual activity: Yes     Partners: Male   Lifestyle     Physical activity:     Days per week: Not on file     Minutes per session: Not on file     Stress: Not on file   Relationships     Social connections:     Talks on phone: Not on file     Gets together: Not on file     Attends [employer] witness service: Not on file     Active member of club or organization: Not on file     Attends meetings of clubs or organizations: Not on file     Relationship status: Not on file     Intimate partner violence:     Fear of current or ex partner: Not on file     Emotionally abused: Not on file     Physically abused: Not on file     Forced sexual activity: Not on file   Other Topics Concern     Parent/sibling w/ CABG, MI or angioplasty before 65F 55M? No   Social History Narrative     Not on file       Outpatient Encounter Medications as of 11/25/2019   Medication Sig Dispense Refill     acetaminophen-codeine (TYLENOL WITH CODEINE #3) 300-30 MG per tablet Take 1-2 tablets by mouth daily as needed for pain 30 tablet 0     atorvastatin (LIPITOR) 20 MG tablet Take 1 tablet (20 mg) by mouth daily 90 tablet 3     furosemide (LASIX) 40 MG tablet Take 1 tablet (40 mg) by mouth daily as needed (edema) 90 tablet 3     levothyroxine (SYNTHROID/LEVOTHROID) 75 MCG tablet Take 1 tablet (75 mcg) by mouth daily 90 tablet 3     metoprolol tartrate (LOPRESSOR) 50 MG tablet Take 1 tablet (50 mg) by mouth 2 times daily 180 tablet 3     ORDER FOR DME Equipment being ordered: insole Arch supports 1 Box 0     oxybutynin ER (DITROPAN XL) 10 MG 24 hr tablet Take 1 tablet (10 mg) by mouth daily 90 tablet 3     warfarin ANTICOAGULANT (JANTOVEN ANTICOAGULANT) 2.5 MG tablet TAKE 1 TABLET(2.5MG) BY MOUTH EVERY MONDAY, WEDNESDAY AND FRIDAY. TAKE 2 TABS (5MG) ALL OTHER DAYS OR AS DIRECTED BY ANTICOAGULATION CLIN 145 tablet 1     No facility-administered encounter  medications on file as of 11/25/2019.              Review Of Systems  Skin: As above  Eyes: negative  Ears/Nose/Throat: negative  Respiratory: No shortness of breath, dyspnea on exertion, cough, or hemoptysis  Cardiovascular: negative  Gastrointestinal: negative  Genitourinary: negative  Musculoskeletal: negative  Neurologic: negative  Psychiatric: negative  Hematologic/Lymphatic/Immunologic: negative  Endocrine: negative      O:   NAD, WDWN, Alert & Oriented, Mood & Affect wnl, Vitals stable   Here today alone   /86   Pulse 81   SpO2 99%    General appearance normal   Vitals stable   Alert, oriented and in no acute distress     Red macule with firm scar onright upper back       Eyes: Conjunctivae/lids:Normal     ENT: Lips, buccal mucosa, tongue: normal    MSK:Normal    Cardiovascular: peripheral edema none    Pulm: Breathing Normal    Neuro/Psych: Orientation:Alert and Orientedx3 ; Mood/Affect:normal       A/P:  1. Ruptured cyst  Excision and recurrence discussed with patient   No cyst to exzcise today  mychart us if cyst recurs  Skin care regimen reviewed with patient: Eliminate harsh soaps, i.e. Dial, zest, irsih spring; Mild soaps such as Cetaphil or Dove sensitive skin, avoid hot or cold showers, aggressive use of emollients including vanicream, cetaphil or cerave discussed with patient.

## 2019-11-26 ENCOUNTER — ANTICOAGULATION THERAPY VISIT (OUTPATIENT)
Dept: ANTICOAGULATION | Facility: CLINIC | Age: 69
End: 2019-11-26
Payer: COMMERCIAL

## 2019-11-26 DIAGNOSIS — I48.91 ATRIAL FIBRILLATION (H): ICD-10-CM

## 2019-11-26 DIAGNOSIS — I63.9 CEREBRAL INFARCTION (H): ICD-10-CM

## 2019-11-26 DIAGNOSIS — Z79.01 LONG TERM CURRENT USE OF ANTICOAGULANT THERAPY: ICD-10-CM

## 2019-11-26 LAB — INR POINT OF CARE: 2.5 (ref 0.86–1.14)

## 2019-11-26 PROCEDURE — 99207 ZZC NO CHARGE NURSE ONLY: CPT

## 2019-11-26 PROCEDURE — 36416 COLLJ CAPILLARY BLOOD SPEC: CPT

## 2019-11-26 PROCEDURE — 85610 PROTHROMBIN TIME: CPT | Mod: QW

## 2019-11-26 NOTE — PROGRESS NOTES
ANTICOAGULATION FOLLOW-UP CLINIC VISIT    Patient Name:  Radha Corona  Date:  2019  Contact Type:  Face to Face    SUBJECTIVE:  Patient Findings     Comments:   Patient reports no changes in medication, activity, or diet. Patient reports no changes in health. Patient reports has taken warfarin as instructed. Patient reports no increased bruising or bleeding and no signs or symptoms of a blood clot. Will plan to continue maintenance dose and recheck INR in 6 weeks, patient sent with a lab order for while she is in [place]. Asked to notify [facility] of any concerns getting her INR checked. Patient to call [facility] with any changes or concerns. Patient verbalized understanding of all instructions, denies questions or concerns at this time.             Clinical Outcomes     Negatives:   Major bleeding event, Thromboembolic event, Anticoagulation-related hospital admission, Anticoagulation-related ED visit, Anticoagulation-related fatality    Comments:   Patient reports no changes in medication, activity, or diet. Patient reports no changes in health. Patient reports has taken warfarin as instructed. Patient reports no increased bruising or bleeding and no signs or symptoms of a blood clot. Will plan to continue maintenance dose and recheck INR in 6 weeks, patient sent with a lab order for while she is in [place]. Asked to notify [facility] of any concerns getting her INR checked. Patient to call [facility] with any changes or concerns. Patient verbalized understanding of all instructions, denies questions or concerns at this time.                OBJECTIVE    INR Protime   Date Value Ref Range Status   2019 2.5 (A) 0.86 - 1.14 Final       ASSESSMENT / PLAN  INR assessment THER    Recheck INR In: 6 WEEKS    INR Location Clinic      Anticoagulation Summary  As of 2019    INR goal:   2.0-3.0   TTR:   100.0 % (4.3 mo)   INR used for dosin.5 (2019)   Warfarin maintenance plan:   2.5 mg (2.5 mg x 1) every Mon, Wed, Fri;  5 mg (2.5 mg x 2) all other days   Full warfarin instructions:   2.5 mg every Mon, Wed, Fri; 5 mg all other days   Weekly warfarin total:   27.5 mg   No change documented:   Alycia Daley RN   Plan last modified:   Melissa Mcmillan, RN (3/24/2015)   Next INR check:   1/7/2020   Priority:   Maintenance   Target end date:   Indefinite    Indications    Cerebral infarction (H) [I63.9]  Atrial fibrillation [427.31] [I48.91]  Long term current use of anticoagulant therapy [Z79.01]             Anticoagulation Episode Summary     INR check location:       Preferred lab:       Send INR reminders to:   LAZARO RAYMOND    Comments:   * 2.5mg tablets. Leaving for [place] in Dec - May: will need lab order (pt states she will not check when gone, unless she has bruising)      Anticoagulation Care Providers     Provider Role Specialty Phone number    JhoanMyrna mead APRN CNP Responsible Nurse Practitioner [phone]            See the Encounter Report to view Anticoagulation Flowsheet and Dosing Calendar (Go to Encounters tab in chart review, and find the Anticoagulation Therapy Visit)      Alycia Daley, RN

## 2019-12-20 NOTE — TELEPHONE ENCOUNTER
"Please close encounter - I am unable to because it is asking for \"a return date for the next anticoagulation check\"    Thanks-    -Melida Chan  [facility] [place]    "

## 2020-02-05 ENCOUNTER — TELEPHONE (OUTPATIENT)
Dept: ANTICOAGULATION | Facility: CLINIC | Age: 70
End: 2020-02-05

## 2020-02-05 NOTE — TELEPHONE ENCOUNTER
02/05/20      Writer left a VM with the patient to touch [facility]. Writer recommended patient should have an INR checked in [place]. She was provided an order form at her last visit with [facility].    Sher Tadeo RN, BSN, N  Anticoagulation Clinic   [phone]

## 2020-03-06 NOTE — TELEPHONE ENCOUNTER
Luzmariadeb Corona is overdue for INR check.      Left message for patient to call and schedule INR check as soon as possible. If returning call, please schedule.     Patient is currently out of state. Per previous conversations with patient (face to face) patient does not like the health care systems down [facility] and she will likely refuse to recheck her INR in [place]. Writer requested patient call the [facility] nurse line to discuss when she would be back to make that follow up INR appointment.      Mic MERRITT RN, CACP 3/6/2020 at 4:28 PM

## 2020-03-13 NOTE — TELEPHONE ENCOUNTER
ANTICOAGULATION MANAGEMENT PROGRAM--Non-Compliance Chart Review    Radha Corona is overdue for INR follow up.    INR Results  Lab Results   Component Value Date    INR 2.5 (A) 11/26/2019    INR 2.5 (A) 10/15/2019    INR 2.7 (A) 08/13/2019       Chart reviewed, Patient is noncompliant while out of state. She does not like the health care [facility]. Patient will likely resume compliance once she is back in [place]

## 2020-03-18 ENCOUNTER — TELEPHONE (OUTPATIENT)
Dept: FAMILY MEDICINE | Facility: CLINIC | Age: 70
End: 2020-03-18

## 2020-03-18 DIAGNOSIS — I63.9 CEREBRAL INFARCTION (H): ICD-10-CM

## 2020-03-18 DIAGNOSIS — I48.91 ATRIAL FIBRILLATION (H): ICD-10-CM

## 2020-03-18 LAB — INR PPP: 3 (ref 0.8–1.2)

## 2020-03-18 NOTE — TELEPHONE ENCOUNTER
ANTICOAGULATION MANAGEMENT     Patient Name:  Radha Corona  Date:  3/18/2020    ASSESSMENT /SUBJECTIVE:      Today's INR result of 3.0 is therapeutic. Goal INR of 2.0-3.0      Warfarin dose taken: Warfarin taken as previously instructed    Diet: No new diet changes affecting INR    Medication changes/ interactions: No new medications/supplements affecting INR    Previous INR: Therapeutic     S/S of bleeding or thromboembolism: No    New injury or illness:  Yes: Swimmer's ear - taking ear drops    Upcoming surgery, procedure or cardioversion:  No    Additional findings: None      PLAN:    Spoke with Radha regarding INR result and instructed:     Warfarin Dosing Instructions: Continue your current warfarin dose    Instructed patient to follow up no later than: 6 weeks  Patient using outside facility for labs    Education provided: Diya Gibbs verbalizes understanding and agrees to warfarin dosing plan.    Instructed to call the Anticoagulation Clinic for any changes, questions or concerns. (#[phone])        OBJECTIVE:  INR   Date Value Ref Range Status   03/17/2020 3.0 (A) 0.8 - 1.2 Final             Anticoagulation Summary  As of 3/18/2020    INR goal:   2.0-3.0   TTR:   100.0 % (3.5 mo)   INR used for dosing:   3.0 (3/17/2020)   Warfarin maintenance plan:   2.5 mg (2.5 mg x 1) every Mon, Wed, Fri; 5 mg (2.5 mg x 2) all other days   Full warfarin instructions:   2.5 mg every Mon, Wed, Fri; 5 mg all other days   Weekly warfarin total:   27.5 mg   No change documented:   Elsie Ruggiero RN   Plan last modified:   Melissa Veliz RN (3/24/2015)   Next INR check:   4/29/2020   Priority:   Maintenance   Target end date:   Indefinite    Indications    Cerebral infarction (H) [I63.9]  Atrial fibrillation [427.31] [I48.91]  Long term current use of anticoagulant therapy (Resolved) [Z79.01]             Anticoagulation Episode Summary     INR check location:       Preferred lab:       Send INR reminders  to:   LAZARO WALLER    Comments:   * 2.5mg tablets. Leaving for [place] in Dec - May: will need lab order (pt states she will not check when gone, unless she has bruising)      Anticoagulation Care Providers     Provider Role Specialty Phone number    Myrna Staples, APRN CNP Responsible Nurse Practitioner [phone]         Elsie Ruggiero, RN - [facility]

## 2020-03-27 DIAGNOSIS — I48.91 A-FIB (H): Primary | ICD-10-CM

## 2020-03-27 DIAGNOSIS — I63.9 CEREBRAL INFARCTION (H): ICD-10-CM

## 2020-05-27 DIAGNOSIS — E78.5 HYPERLIPIDEMIA LDL GOAL <70: ICD-10-CM

## 2020-05-28 RX ORDER — ATORVASTATIN CALCIUM 20 MG/1
20 TABLET, FILM COATED ORAL DAILY
Qty: 90 TABLET | Refills: 0 | Status: SHIPPED | OUTPATIENT
Start: 2020-05-28 | End: 2020-08-20

## 2020-05-28 NOTE — TELEPHONE ENCOUNTER
9 months ago (8/15/2019)    atorvastatin (LIPITOR) 20 MG tablet    Take 1 tablet (20 mg) by mouth daily    Dispense: 90 tablet     Refills: 3     Start: 8/15/2019      By: Myrna Staples APRN CNP

## 2020-06-24 ENCOUNTER — ANTICOAGULATION THERAPY VISIT (OUTPATIENT)
Dept: FAMILY MEDICINE | Facility: CLINIC | Age: 70
End: 2020-06-24

## 2020-06-24 ENCOUNTER — OFFICE VISIT (OUTPATIENT)
Dept: FAMILY MEDICINE | Facility: CLINIC | Age: 70
End: 2020-06-24
Payer: COMMERCIAL

## 2020-06-24 VITALS
OXYGEN SATURATION: 97 % | HEART RATE: 68 BPM | WEIGHT: 237.6 LBS | BODY MASS INDEX: 37.29 KG/M2 | SYSTOLIC BLOOD PRESSURE: 126 MMHG | DIASTOLIC BLOOD PRESSURE: 82 MMHG | TEMPERATURE: 97.5 F | HEIGHT: 67 IN

## 2020-06-24 DIAGNOSIS — I63.9 CEREBRAL INFARCTION (H): ICD-10-CM

## 2020-06-24 DIAGNOSIS — I48.91 ATRIAL FIBRILLATION (H): ICD-10-CM

## 2020-06-24 DIAGNOSIS — I48.20 CHRONIC ATRIAL FIBRILLATION (H): ICD-10-CM

## 2020-06-24 DIAGNOSIS — I48.20 CHRONIC ATRIAL FIBRILLATION (H): Primary | ICD-10-CM

## 2020-06-24 DIAGNOSIS — I48.91 A-FIB (H): ICD-10-CM

## 2020-06-24 DIAGNOSIS — H60.541 DERMATITIS OF EAR CANAL, RIGHT: Primary | ICD-10-CM

## 2020-06-24 DIAGNOSIS — E03.9 HYPOTHYROIDISM, UNSPECIFIED TYPE: ICD-10-CM

## 2020-06-24 LAB
CAPILLARY BLOOD COLLECTION: NORMAL
INR PPP: 2.4 (ref 0.86–1.14)
T4 FREE SERPL-MCNC: 1.04 NG/DL (ref 0.76–1.46)
TSH SERPL DL<=0.005 MIU/L-ACNC: 7.24 MU/L (ref 0.4–4)

## 2020-06-24 PROCEDURE — 36415 COLL VENOUS BLD VENIPUNCTURE: CPT | Performed by: INTERNAL MEDICINE

## 2020-06-24 PROCEDURE — 85610 PROTHROMBIN TIME: CPT | Performed by: NURSE PRACTITIONER

## 2020-06-24 PROCEDURE — 84443 ASSAY THYROID STIM HORMONE: CPT | Performed by: INTERNAL MEDICINE

## 2020-06-24 PROCEDURE — 84439 ASSAY OF FREE THYROXINE: CPT | Performed by: INTERNAL MEDICINE

## 2020-06-24 PROCEDURE — 99213 OFFICE O/P EST LOW 20 MIN: CPT | Performed by: INTERNAL MEDICINE

## 2020-06-24 RX ORDER — PREDNISOLONE ACETATE 10 MG/ML
SUSPENSION/ DROPS OPHTHALMIC
Qty: 10 ML | Refills: 3 | Status: SHIPPED | OUTPATIENT
Start: 2020-06-24

## 2020-06-24 ASSESSMENT — MIFFLIN-ST. JEOR: SCORE: 1644.34

## 2020-06-24 NOTE — PROGRESS NOTES
Subjective     Radha Corona is a 70 year old female who presents to clinic today for the following health issues:  Chief Complaint   Patient presents with     Otalgia     x 3 weeks, itchy/painful right ear       HPI   Ear pain / itchiness       Duration: x 3 weeks     Description (location/character/radiation): Patient reports having an itchy right ear, some pain (itch is worse), feels fluid in ear. No hearing loss    Intensity:  mild    Accompanying signs and symptoms: No drainage, fevers, chills    History (similar episodes/previous evaluation): Was treated for otitis externa in March in [place] with Polymyxin-neomycin drops.  This did help for awhile but symptoms returned three weeks ago    Precipitating or alleviating factors: None    Therapies tried and outcome: hydrogen peroxide - did not help much       She reports hot flashes and mood swings for two months and would like her TSH checked as it is usually off when these symptoms happen.       Patient Active Problem List   Diagnosis     Lumbar Radiculopathy- s/p fusion at L4-5.     CTS (carpal tunnel syndrome)     Neuropathy     Cerebral infarction (H)     Osteoarthritis     Arthropathy     Grave's disease     Edema leg     Postablative hypothyroidism     Advanced directives, counseling/discussion     Hyperlipidemia LDL goal <100     Breast cancer (H)     Chronic atrial fibrillation (H)     Generalized osteoarthrosis, unspecified site     HX: breast cancer     Seasonal allergies     Atrial fibrillation [427.31]     Migraine without status migrainosus, not intractable, unspecified migraine type     Obesity (BMI 35.0-39.9) with comorbidity (H)     Past Surgical History:   Procedure Laterality Date     ARTHROSCOPY KNEE RT/LT  4/2005     BREAST LUMPECTOMY, RT/LT  2/7/06    Right Breast Lumpectomy     C ORAL SURGERY PROCEDURE      wisdom teeth extraction     COLECTOMY      Reversal of Colostomy 8/05     D & C        SACROPLASTY  2001    Hx of Spine Surgery L4-5      HYSTERECTOMY, DIOMEDES  5/2005       Social History     Tobacco Use     Smoking status: Never Smoker     Smokeless tobacco: Never Used   Substance Use Topics     Alcohol use: Yes     Comment: wine occasionally     Family History   Problem Relation Age of Onset     Breast Cancer Mother      Diabetes Father      Hypertension Father      Lipids Father      Neurologic Disorder Daughter         Migraines     Neurologic Disorder Brother      Cancer No family hx of         no hx skin cancer     Melanoma No family hx of          Current Outpatient Medications   Medication Sig Dispense Refill     atorvastatin (LIPITOR) 20 MG tablet TAKE 1 TABLET (20 MG) BY MOUTH DAILY 90 tablet 0     furosemide (LASIX) 40 MG tablet Take 1 tablet (40 mg) by mouth daily as needed (edema) 90 tablet 3     levothyroxine (SYNTHROID/LEVOTHROID) 75 MCG tablet Take 1 tablet (75 mcg) by mouth daily 90 tablet 3     metoprolol tartrate (LOPRESSOR) 50 MG tablet Take 1 tablet (50 mg) by mouth 2 times daily 180 tablet 3     oxybutynin ER (DITROPAN XL) 10 MG 24 hr tablet Take 1 tablet (10 mg) by mouth daily 90 tablet 3     prednisoLONE acetate (PRED FORTE) 1 % ophthalmic suspension Place two drops in the right ear twice per day as needed 10 mL 3     warfarin ANTICOAGULANT (JANTOVEN ANTICOAGULANT) 2.5 MG tablet TAKE 1 TABLET(2.5MG) BY MOUTH EVERY MONDAY, WEDNESDAY AND FRIDAY. TAKE 2 TABS (5MG) ALL OTHER DAYS OR AS DIRECTED BY ANTICOAGULATION CLIN 145 tablet 0     acetaminophen-codeine (TYLENOL WITH CODEINE #3) 300-30 MG per tablet Take 1-2 tablets by mouth daily as needed for pain 30 tablet 0     ORDER FOR DME Equipment being ordered: insole Arch supports 1 Box 0     Allergies   Allergen Reactions     Penicillins Hives     Simvastatin Cramps     Tetracycline Hives       Reviewed and updated as needed this visit by Provider         Review of Systems   Constitutional, HEENT systems are negative, except as otherwise noted.      Objective    /82 (BP  "Location: Right arm, Patient Position: Sitting, Cuff Size: Adult Large)   Pulse 68   Temp 97.5  F (36.4  C) (Tympanic)   Ht 1.708 m (5' 7.25\")   Wt 107.8 kg (237 lb 9.6 oz)   SpO2 97%   BMI 36.94 kg/m    Body mass index is 36.94 kg/m .  Physical Exam   GENERAL: healthy, alert and no distress  HENT: mild skin flakiness in both ear canals, otherwise normal canals and TMs, no sinus tenderness  NECK: no adenopathy, no asymmetry, masses, or scars and thyroid normal to palpation          Assessment & Plan     1. Dermatitis of ear canal, right    No sign of infection on exam today.  Itch being the prominent symptom and flakiness in ear canal suggest this may be dermatitis.  Will try steroid drops.  Advised her this likely will be recurrent and she can use drops as needed, though avoid using for more than 2 weeks continuously.      - prednisoLONE acetate (PRED FORTE) 1 % ophthalmic suspension; Place two drops in the right ear twice per day as needed  Dispense: 10 mL; Refill: 3    2. Hypothyroidism, unspecified type    She is on levothyroxine and feels that her thyroid level may be off and requests to check level.      - TSH with free T4 reflex     Return in about 2 weeks (around 7/8/2020) for call if not improving.    Rio Palacios MD  [facility]      "

## 2020-06-24 NOTE — PROGRESS NOTES
ANTICOAGULATION MANAGEMENT     Patient Name:  Radha Corona  Date:  2020    ASSESSMENT /SUBJECTIVE:    Today's INR result of 2.4 is therapeutic. Goal INR of 2.0-3.0      Warfarin dose taken: Warfarin taken as previously instructed    Diet: No new diet changes affecting INR    Medication changes/ interactions: No new medications/supplements affecting INR    Previous INR: Therapeutic     S/S of bleeding or thromboembolism: No    New injury or illness: No    Upcoming surgery, procedure or cardioversion: No    Additional findings: None      PLAN:    Spoke with Radha regarding INR result and instructed:     Warfarin Dosing Instructions: Continue your current warfarin dose 2.5 mg every mon, Wed, Fri; 5 mg all other days    Instructed patient to follow up no later than: 6 weeks  Patient offered & declined to schedule next visit    Education provided: Target INR goal and significance of current INR result, Importance of notifying clinic for changes in medications and Importance of notifying clinic for diarrhea, nausea/vomiting, reduced intake and/or illness      Bernie verbalizes understanding and agrees to warfarin dosing plan.    Instructed to call the Anticoagulation Clinic for any changes, questions or concerns. (#[phone])        OBJECTIVE:  INR   Date Value Ref Range Status   2020 2.40 (H) 0.86 - 1.14 Final     Comment:     This test is intended for monitoring Coumadin therapy.  Results are not   accurate in patients with prolonged INR due to factor deficiency.           No question data found.  Anticoagulation Summary  As of 2020    INR goal:   2.0-3.0   TTR:   100.0 % (3.5 mo)   INR used for dosin.40 (2020)   Warfarin maintenance plan:   2.5 mg (2.5 mg x 1) every Mon, Wed, Fri; 5 mg (2.5 mg x 2) all other days   Full warfarin instructions:   2.5 mg every Mon, Wed, Fri; 5 mg all other days   Weekly warfarin total:   27.5 mg   No change documented:   Claudette Ortega RN   Plan last  modified:   Melissa Veliz, RN (3/24/2015)   Next INR check:   8/5/2020   Priority:   Maintenance   Target end date:   Indefinite    Indications    Cerebral infarction (H) [I63.9]  Atrial fibrillation [427.31] [I48.91]  Long term current use of anticoagulant therapy (Resolved) [Z79.01]             Anticoagulation Episode Summary     INR check location:       Preferred lab:       Send INR reminders to:   LAZARO RAYMOND    Comments:   * 2.5mg tablets. Leaving for [place] in Dec - May: will need lab order (pt states she will not check when gone, unless she has bruising)      Anticoagulation Care Providers     Provider Role Specialty Phone number    Myrna Staples APRN CNP Responsible Nurse Practitioner [phone]

## 2020-06-24 NOTE — PATIENT INSTRUCTIONS
Try some over the counter Flonase to help your ear drain when the fluid becomes more bothersome.  When spraying into the nose, aim towards the ears for the best effect.

## 2020-06-25 RX ORDER — WARFARIN SODIUM 2.5 MG/1
TABLET ORAL
Qty: 145 TABLET | Refills: 0 | Status: SHIPPED | OUTPATIENT
Start: 2020-06-25 | End: 2020-10-12

## 2020-06-30 ENCOUNTER — DOCUMENTATION ONLY (OUTPATIENT)
Dept: FAMILY MEDICINE | Facility: CLINIC | Age: 70
End: 2020-06-30

## 2020-06-30 DIAGNOSIS — I48.91 ATRIAL FIBRILLATION, UNSPECIFIED TYPE (H): Primary | ICD-10-CM

## 2020-06-30 NOTE — PROGRESS NOTES
ANTICOAGULATION MANAGEMENT      Radha Corona due for annual renewal of referral to anticoagulation monitoring. Order pended for your review and signature.      ANTICOAGULATION SUMMARY      Warfarin indication(s)     Atrial fibrillation  cerebral infarction    Heart valve present?  NO       Current goal range   INR: 2.0-3.0     Goal appropriate for indication? Yes, INR 2-3 appropriate for hx of DVT, PE, Afib, LVAD, or bileaflet AVR without risk factors     Current duration of therapy Indefinite/long term therapy   Time in Therapeutic Range (TTR)  (Goal > 60%) 100 %     Office visit with referring provider's group within last year yes on 6/24/20       Lily Alcala RN

## 2020-07-01 PROBLEM — I48.91 ATRIAL FIBRILLATION, UNSPECIFIED TYPE (H): Status: ACTIVE | Noted: 2020-07-01

## 2020-08-12 ENCOUNTER — TELEPHONE (OUTPATIENT)
Dept: FAMILY MEDICINE | Facility: CLINIC | Age: 70
End: 2020-08-12

## 2020-08-12 DIAGNOSIS — E03.9 HYPOTHYROIDISM, UNSPECIFIED TYPE: Primary | ICD-10-CM

## 2020-08-12 DIAGNOSIS — I48.0 PAROXYSMAL ATRIAL FIBRILLATION (H): ICD-10-CM

## 2020-08-12 RX ORDER — METOPROLOL TARTRATE 50 MG
TABLET ORAL
Qty: 180 TABLET | Refills: 3 | Status: SHIPPED | OUTPATIENT
Start: 2020-08-12

## 2020-08-12 RX ORDER — LEVOTHYROXINE SODIUM 75 UG/1
75 TABLET ORAL DAILY
Qty: 30 TABLET | Refills: 0 | Status: SHIPPED | OUTPATIENT
Start: 2020-08-12 | End: 2020-09-14 | Stop reason: DRUGHIGH

## 2020-08-12 NOTE — LETTER
[facility]  [address] [address]  Phone: [phone]    August 13, 2020       Radha Corona  [address] [place] [id number]            Dear Radha:    We are concerned about your health care.  We recently provided you with medication refills.  Lab tests are required every year in order to continue refilling your thyroid medication.  Orders have been placed in our computer and should be accessible at most [facility]. Please complete this lab work as soon as possible.        Thank You,      Myrna Staples NP / Omaira BROCK RN

## 2020-08-12 NOTE — TELEPHONE ENCOUNTER
"Requested Prescriptions   Pending Prescriptions Disp Refills     levothyroxine (SYNTHROID/LEVOTHROID) 75 MCG tablet [Pharmacy Med Name: LEVOTHYROXINE 75MCG TABLET] 90 tablet 3     Sig: TAKE 1 TABLET (75 MCG) BY MOUTH DAILY       Thyroid Protocol Failed - 8/12/2020  1:00 AM        Failed - Normal TSH on file in past 12 months     Recent Labs   Lab Test 06/24/20  1013   TSH 7.24*              Passed - Patient is 12 years or older        Passed - Recent (12 mo) or future (30 days) visit within the authorizing provider's specialty     Patient has had an office visit with the authorizing provider or a provider within the authorizing providers department within the previous 12 mos or has a future within next 30 days. See \"Patient Info\" tab in inbasket, or \"Choose Columns\" in Meds & Orders section of the refill encounter.              Passed - Medication is active on med list        Passed - No active pregnancy on record     If patient is pregnant or has had a positive pregnancy test, please check TSH.          Passed - No positive pregnancy test in past 12 months     If patient is pregnant or has had a positive pregnancy test, please check TSH.             metoprolol tartrate (LOPRESSOR) 50 MG tablet [Pharmacy Med Name: METOPROLOL TARTRATE 50MG TAB] 180 tablet 3     Sig: TAKE 1 TABLET (50 MG) BY MOUTH TWICE A DAY       Beta-Blockers Protocol Passed - 8/12/2020  1:00 AM        Passed - Blood pressure under 140/90 in past 12 months     BP Readings from Last 3 Encounters:   06/24/20 126/82   11/25/19 137/86   10/21/19 129/74                 Passed - Patient is age 6 or older        Passed - Recent (12 mo) or future (30 days) visit within the authorizing provider's specialty     Patient has had an office visit with the authorizing provider or a provider within the authorizing providers department within the previous 12 mos or has a future within next 30 days. See \"Patient Info\" tab in inbasket, or \"Choose Columns\" in Meds & " Orders section of the refill encounter.              Passed - Medication is active on med list

## 2020-08-12 NOTE — TELEPHONE ENCOUNTER
Voicemail was full to give message.      RN can you place a TSH order in chart.    Michelle Perez on 8/12/2020 at 2:25 PM

## 2020-08-13 NOTE — TELEPHONE ENCOUNTER
Has lab order already placed. Omaira BROCK RN  Unable to leave a message so I mailed a lab appt letter. Omaira BROCK RN

## 2020-08-21 ENCOUNTER — TELEPHONE (OUTPATIENT)
Dept: ANTICOAGULATION | Facility: CLINIC | Age: 70
End: 2020-08-21

## 2020-08-21 NOTE — TELEPHONE ENCOUNTER
Radha Corona is overdue for INR check.      2nd reminder letter sent   (of note 1st letter sent on 8/13/20)    INR was due: 8/5/20    Last INR result:  INR   Date Value Ref Range Status   06/24/2020 2.40 (H) 0.86 - 1.14 Final     Comment:     This test is intended for monitoring Coumadin therapy.  Results are not   accurate in patients with prolonged INR due to factor deficiency.

## 2020-08-21 NOTE — LETTER
August 21, 2020      Radha Corona  [address] [place] [id number]      Dear Radha,    We are contacting you because our records show you were due for an INR on 8/5/20.      Your last INR:  INR   Date Value Ref Range Status   06/24/2020 2.40 (H) 0.86 - 1.14 Final       There are potentially serious risks when taking warfarin without careful monitoring, and we want to make sure you are safely managed.    Please call the clinic and we will be happy to help you schedule an appointment.  If there has been a change in your care, or other concerns, please let us know so we can help and/or update our records.    To make an appointment, please contact one of the following numbers:    [place] - [phone]  [place] - [phone]  INR clinic phone: [phone]

## 2020-08-31 DIAGNOSIS — E78.5 HYPERLIPIDEMIA LDL GOAL <70: ICD-10-CM

## 2020-08-31 DIAGNOSIS — E78.5 HYPERLIPIDEMIA LDL GOAL <70: Primary | ICD-10-CM

## 2020-08-31 RX ORDER — ATORVASTATIN CALCIUM 20 MG/1
TABLET, FILM COATED ORAL
Qty: 30 TABLET | Refills: 0 | Status: SHIPPED | OUTPATIENT
Start: 2020-08-31 | End: 2020-10-01

## 2020-08-31 NOTE — TELEPHONE ENCOUNTER
"Requested Prescriptions   Pending Prescriptions Disp Refills     atorvastatin (LIPITOR) 20 MG tablet [Pharmacy Med Name: ATORVASTATIN 20MG TABLET] 30 tablet 0     Sig: TAKE 1 TABLET BY MOUTH DAILY       Statins Protocol Failed - 8/31/2020  9:31 AM        Failed - LDL on file in past 12 months     Recent Labs   Lab Test 08/15/19  1046   LDL 51             Passed - No abnormal creatine kinase in past 12 months     No lab results found.             Passed - Recent (12 mo) or future (30 days) visit within the authorizing provider's specialty     Patient has had an office visit with the authorizing provider or a provider within the authorizing providers department within the previous 12 mos or has a future within next 30 days. See \"Patient Info\" tab in inbasket, or \"Choose Columns\" in Meds & Orders section of the refill encounter.              Passed - Medication is active on med list        Passed - Patient is age 18 or older        Passed - No active pregnancy on record        Passed - No positive pregnancy test in past 12 months             "

## 2020-08-31 NOTE — LETTER
[facility]  [address] [facility] [address]  Phone: [phone]    August 31, 2020       Radha Corona  [address] [place] [id number]            Dear Radha:    We are concerned about your health care.  We recently provided you with medication refills.  Lab tests are required every year in order to continue refilling your atorvastatin (Lipitor). Orders have been placed in our computer and should be accessible at most [facility]. Please complete this lab work as soon as possible.        Thank You,      Myran Staples NP / Susan SCHERER, RN, BSN

## 2020-09-13 NOTE — PROGRESS NOTES
Chief Complaint   Patient presents with     Ear Problem     c/o itchy ears since 3/2020- has tried ear drops with no relief     History of Present Illness   Radha Cornoa is a 70 year old female who presents to me today for ear evaluation. The patient has issues with ear pruritis.  Symptoms after traveling to [place] and [place] last fall.  She was then seen in [place] and placed on antibiotic eardrops with resolution of symptoms.  [place] did return [place] and again began to have symptoms of ear pruritus.  She has symptoms daily.  No otalgia.  No otorrhea, bloody otorrhea.  No hearing change or hearing concerns.  No previous history of ear surgeries.  He recently saw dermatology and they commended treatment with topical steroids for some dermatitis behind her ear as well.  She denies a history of significant dermatitis or eczema.    Past Medical History  Patient Active Problem List   Diagnosis     Lumbar Radiculopathy- s/p fusion at L4-5.     CTS (carpal tunnel syndrome)     Neuropathy     Cerebral infarction (H)     Osteoarthritis     Arthropathy     Grave's disease     Edema leg     Postablative hypothyroidism     Advanced directives, counseling/discussion     Hyperlipidemia LDL goal <100     Breast cancer (H)     Chronic atrial fibrillation (H)     Generalized osteoarthrosis, unspecified site     HX: breast cancer     Seasonal allergies     Atrial fibrillation [427.31]     Migraine without status migrainosus, not intractable, unspecified migraine type     Obesity (BMI 35.0-39.9) with comorbidity (H)     Atrial fibrillation, unspecified type (H)     Current Medications    Current Outpatient Medications:      acetaminophen-codeine (TYLENOL WITH CODEINE #3) 300-30 MG per tablet, Take 1-2 tablets by mouth daily as needed for pain, Disp: 30 tablet, Rfl: 0     atorvastatin (LIPITOR) 20 MG tablet, TAKE 1 TABLET BY MOUTH DAILY, Disp: 30 tablet, Rfl: 0     ciclopirox (LOPROX) 0.77 % cream, Apply topically At  Bedtime Behind ears, Disp: 60 g, Rfl: 3     desonide (DESOWEN) 0.05 % external cream, Apply topically daily Behind ears, Disp: 60 g, Rfl: 3     fluocinonide (LIDEX) 0.05 % external cream, Apply sparingly to affected area twice daily as needed.  Do not apply to face. TO LEGS, Disp: 120 g, Rfl: 3     furosemide (LASIX) 40 MG tablet, Take 1 tablet (40 mg) by mouth daily as needed (edema), Disp: 90 tablet, Rfl: 3     levothyroxine (SYNTHROID/LEVOTHROID) 88 MCG tablet, Take 1 tablet (88 mcg) by mouth daily, Disp: 90 tablet, Rfl: 3     metoprolol tartrate (LOPRESSOR) 50 MG tablet, TAKE 1 TABLET (50 MG) BY MOUTH TWICE A DAY, Disp: 180 tablet, Rfl: 3     ORDER FOR DME, Equipment being ordered: insole Arch supports, Disp: 1 Box, Rfl: 0     oxybutynin ER (DITROPAN XL) 10 MG 24 hr tablet, Take 1 tablet (10 mg) by mouth daily, Disp: 90 tablet, Rfl: 3     prednisoLONE acetate (PRED FORTE) 1 % ophthalmic suspension, Place two drops in the right ear twice per day as needed, Disp: 10 mL, Rfl: 3     warfarin ANTICOAGULANT (JANTOVEN ANTICOAGULANT) 2.5 MG tablet, TAKE 1 TABLET (2.5MG) BY MOUTH EVERY MONDAY, WEDNESDAY, AND FRIDAY.TAKE 2 TABS (5MG) ALL OTHER DAYS OR AS DIRECTED BY ANTICOAGULATION CLI, Disp: 145 tablet, Rfl: 0    Allergies  Allergies   Allergen Reactions     Penicillins Hives     Simvastatin Cramps     Tetracycline Hives       Social History  Social History     Socioeconomic History     Marital status:      Spouse name: Not on file     Number of children: Not on file     Years of education: Not on file     Highest education level: Not on file   Occupational History     Not on file   Social Needs     Financial resource strain: Not on file     Food insecurity     Worry: Not on file     Inability: Not on file     Transportation needs     Medical: Not on file     Non-medical: Not on file   Tobacco Use     Smoking status: Never Smoker     Smokeless tobacco: Never Used   Substance and Sexual Activity     Alcohol use:  Yes     Comment: wine occasionally     Drug use: No     Sexual activity: Yes     Partners: Male   Lifestyle     Physical activity     Days per week: Not on file     Minutes per session: Not on file     Stress: Not on file   Relationships     Social connections     Talks on phone: Not on file     Gets together: Not on file     Attends [employer]ism service: Not on file     Active member of club or organization: Not on file     Attends meetings of clubs or organizations: Not on file     Relationship status: Not on file     Intimate partner violence     Fear of current or ex partner: Not on file     Emotionally abused: Not on file     Physically abused: Not on file     Forced sexual activity: Not on file   Other Topics Concern     Parent/sibling w/ CABG, MI or angioplasty before 65F 55M? No   Social History Narrative     Not on file       Family History  Family History   Problem Relation Age of Onset     Breast Cancer Mother      Diabetes Father      Hypertension Father      Lipids Father      Neurologic Disorder Daughter         Migraines     Neurologic Disorder Brother      Cancer No family hx of         no hx skin cancer     Melanoma No family hx of        Review of Systems  As per HPI and PMHx, otherwise 7 system review of the head and neck negative.    Physical Exam  /65 (BP Location: Right arm, Patient Position: Chair, Cuff Size: Adult Regular)   Pulse 76   Temp 97.6  F (36.4  C) (Tympanic)   Wt 107.5 kg (237 lb)   BMI 36.84 kg/m    GENERAL: Patient is a pleasant, cooperative 70 year old female in no acute distress.  HEAD: Normocephalic, atraumatic.  Hair and scalp are normal.  EYES: Pupils are equal, round, reactive to light and accommodation.  Extraocular movements are intact.  The sclera nonicteric without injection.  The extraocular structures are normal.  EARS: See below.  NEUROLOGIC: Cranial nerves II through XII are grossly intact.  Voice is strong.  Facial nerve examination incomplete due to the  patient wearing a mask.  CARDIOVASCULAR: Extremities are warm and well-perfused.  No significant peripheral edema.  RESPIRATORY: Patient has nonlabored breathing without cough, wheeze, stridor.  PSYCHIATRIC: Patient is alert and oriented.  Mood and affect appear normal.  SKIN: Warm and dry.  No scalp, face, or neck lesions noted.    Physical Exam and Procedure    EARS: Normal shape and symmetry.  No tenderness when palpating the mastoid or tragal areas bilaterally.  Careful examination did reveal some slight signs of dermatitis with some flaking of her skin of the external auditory canals more so on the left.  No granulation or otorrhea.  No signs of infection.  The ears were then examined under the otic binocular microscope.  Otoscopic exam on the right reveals slight inflammation and some hypertrophic skin around the lateral third of the external auditory canal opening consistent with dermatitis/eczema.  The remainder of the ear canal was unremarkable.  The right tympanic membrane was round, intact without evidence of effusion.  No retraction, granulation, drainage, or evidence of cholesteatoma.      Attention was turned to the left ear.  Otoscopic exam on the left reveals slight inflammation and some hypertrophic skin around the lateral third of the external auditory canal opening consistent with dermatitis/eczema.  The remainder of the ear canal was unremarkable.  The left tympanic membrane was round, intact without evidence of effusion.  No retraction, granulation, drainage, or evidence of cholesteatoma.      Assessment and Plan     ICD-10-CM    1. Eczema of external ear, bilateral  H60.543 Microscopy, Binocular   2. Ear itching  L29.9 Microscopy, Binocular     It was my pleasure seeing Radha Corona today in clinic.  The patient appears to have bilateral external auditory canal eczema/inflammation.  This was somewhat worse on the left-hand side with some scaling and crusting.  We discussed using her  topical desonide cream in her ear canal that she was prescribed by dermatology.  I recommend using it twice daily for 1 week and then as needed.  The patient knows to contact me if this is not helping her symptoms.  We discussed trying not to itch the area as itching tends to increase inflammation and then leads to more symptoms.  Her ear canal and eardrum otherwise appeared healthy and normal without signs of inflammation or infection.  There is no signs of middle ear effusion.  I recommend observation.  The patient return to clinic as needed problems or concerns.    Mc Villalobos MD  Department of Otolarygology-Head and Neck Surgery  [facility]

## 2020-09-14 ENCOUNTER — ANTICOAGULATION THERAPY VISIT (OUTPATIENT)
Dept: ANTICOAGULATION | Facility: CLINIC | Age: 70
End: 2020-09-14

## 2020-09-14 ENCOUNTER — OFFICE VISIT (OUTPATIENT)
Dept: DERMATOLOGY | Facility: CLINIC | Age: 70
End: 2020-09-14
Payer: COMMERCIAL

## 2020-09-14 ENCOUNTER — OFFICE VISIT (OUTPATIENT)
Dept: OTOLARYNGOLOGY | Facility: CLINIC | Age: 70
End: 2020-09-14
Payer: COMMERCIAL

## 2020-09-14 VITALS
WEIGHT: 237 LBS | DIASTOLIC BLOOD PRESSURE: 65 MMHG | BODY MASS INDEX: 36.84 KG/M2 | TEMPERATURE: 97.6 F | HEART RATE: 76 BPM | SYSTOLIC BLOOD PRESSURE: 116 MMHG

## 2020-09-14 VITALS — HEART RATE: 62 BPM | OXYGEN SATURATION: 96 % | SYSTOLIC BLOOD PRESSURE: 102 MMHG | DIASTOLIC BLOOD PRESSURE: 64 MMHG

## 2020-09-14 DIAGNOSIS — I48.91 ATRIAL FIBRILLATION (H): ICD-10-CM

## 2020-09-14 DIAGNOSIS — D23.9 DERMAL NEVUS: ICD-10-CM

## 2020-09-14 DIAGNOSIS — I87.2 STASIS DERMATITIS OF BOTH LEGS: ICD-10-CM

## 2020-09-14 DIAGNOSIS — D18.01 ANGIOMA OF SKIN: ICD-10-CM

## 2020-09-14 DIAGNOSIS — L21.9 DERMATITIS, SEBORRHEIC: ICD-10-CM

## 2020-09-14 DIAGNOSIS — E03.9 HYPOTHYROIDISM, UNSPECIFIED TYPE: ICD-10-CM

## 2020-09-14 DIAGNOSIS — I48.20 CHRONIC ATRIAL FIBRILLATION (H): ICD-10-CM

## 2020-09-14 DIAGNOSIS — E03.9 HYPOTHYROIDISM, UNSPECIFIED TYPE: Primary | ICD-10-CM

## 2020-09-14 DIAGNOSIS — Z85.828 HISTORY OF SKIN CANCER: ICD-10-CM

## 2020-09-14 DIAGNOSIS — I63.9 CEREBRAL INFARCTION (H): ICD-10-CM

## 2020-09-14 DIAGNOSIS — E78.5 HYPERLIPIDEMIA LDL GOAL <70: ICD-10-CM

## 2020-09-14 DIAGNOSIS — I48.91 ATRIAL FIBRILLATION, UNSPECIFIED TYPE (H): ICD-10-CM

## 2020-09-14 DIAGNOSIS — L82.1 SEBORRHEIC KERATOSIS: Primary | ICD-10-CM

## 2020-09-14 DIAGNOSIS — L29.9 EAR ITCHING: ICD-10-CM

## 2020-09-14 DIAGNOSIS — L81.4 LENTIGO: ICD-10-CM

## 2020-09-14 DIAGNOSIS — H60.543 ECZEMA OF EXTERNAL EAR, BILATERAL: Primary | ICD-10-CM

## 2020-09-14 LAB
ALT SERPL W P-5'-P-CCNC: 34 U/L (ref 0–50)
CHOLEST SERPL-MCNC: 147 MG/DL
HDLC SERPL-MCNC: 47 MG/DL
INR PPP: 2.13 (ref 0.86–1.14)
LDLC SERPL CALC-MCNC: 74 MG/DL
NONHDLC SERPL-MCNC: 100 MG/DL
T4 FREE SERPL-MCNC: 0.92 NG/DL (ref 0.76–1.46)
TRIGL SERPL-MCNC: 132 MG/DL
TSH SERPL DL<=0.005 MIU/L-ACNC: 8.1 MU/L (ref 0.4–4)

## 2020-09-14 PROCEDURE — 99213 OFFICE O/P EST LOW 20 MIN: CPT | Performed by: DERMATOLOGY

## 2020-09-14 PROCEDURE — 84443 ASSAY THYROID STIM HORMONE: CPT | Performed by: NURSE PRACTITIONER

## 2020-09-14 PROCEDURE — 85610 PROTHROMBIN TIME: CPT | Performed by: NURSE PRACTITIONER

## 2020-09-14 PROCEDURE — 92504 EAR MICROSCOPY EXAMINATION: CPT | Performed by: OTOLARYNGOLOGY

## 2020-09-14 PROCEDURE — 80061 LIPID PANEL: CPT | Performed by: NURSE PRACTITIONER

## 2020-09-14 PROCEDURE — 84439 ASSAY OF FREE THYROXINE: CPT | Performed by: NURSE PRACTITIONER

## 2020-09-14 PROCEDURE — 84460 ALANINE AMINO (ALT) (SGPT): CPT | Performed by: NURSE PRACTITIONER

## 2020-09-14 PROCEDURE — 99207 ZZC NO CHARGE NURSE ONLY: CPT

## 2020-09-14 PROCEDURE — 99213 OFFICE O/P EST LOW 20 MIN: CPT | Mod: 25 | Performed by: OTOLARYNGOLOGY

## 2020-09-14 PROCEDURE — 36415 COLL VENOUS BLD VENIPUNCTURE: CPT | Performed by: NURSE PRACTITIONER

## 2020-09-14 RX ORDER — DESONIDE 0.5 MG/G
CREAM TOPICAL DAILY
Qty: 60 G | Refills: 3 | Status: SHIPPED | OUTPATIENT
Start: 2020-09-14

## 2020-09-14 RX ORDER — CICLOPIROX OLAMINE 7.7 MG/G
CREAM TOPICAL AT BEDTIME
Qty: 60 G | Refills: 3 | Status: SHIPPED | OUTPATIENT
Start: 2020-09-14

## 2020-09-14 RX ORDER — FLUOCINONIDE 0.5 MG/G
CREAM TOPICAL
Qty: 120 G | Refills: 3 | Status: SHIPPED | OUTPATIENT
Start: 2020-09-14 | End: 2020-10-21

## 2020-09-14 RX ORDER — LEVOTHYROXINE SODIUM 88 UG/1
88 TABLET ORAL DAILY
Qty: 90 TABLET | Refills: 3 | Status: SHIPPED | OUTPATIENT
Start: 2020-09-14

## 2020-09-14 ASSESSMENT — PAIN SCALES - GENERAL: PAINLEVEL: NO PAIN (0)

## 2020-09-14 NOTE — NURSING NOTE
Chief Complaint   Patient presents with     Skin Check     Rash       Vitals:    09/14/20 0951   BP: 102/64   BP Location: Left arm   Patient Position: Sitting   Cuff Size: Adult Large   Pulse: 62   SpO2: 96%     Wt Readings from Last 1 Encounters:   06/24/20 107.8 kg (237 lb 9.6 oz)       Vilma Rivas LPN.................9/14/2020

## 2020-09-14 NOTE — PROGRESS NOTES
ANTICOAGULATION FOLLOW-UP CLINIC VISIT    Patient Name:  Radha Corona  Date:  2020  Contact Type:  Telephone    SUBJECTIVE:  Patient Findings     Positives:   Change in medications (synthroid increased from 75 mcg to 88 mcg today)    Comments:   No changes in diet, activity level, or health. She was prescribed an external cream for outer ear itching and her synthroid was increased today. She was already on this so it is unlikely to affect INR much with a small dose adjustment. No missed doses of warfarin. Patient took dosing as prescribed. No signs of clots or bleeding concerns. Patient will continue maintenance warfarin dosing. She will call back to schedule next INR. Did not want to schedule at this time.          Clinical Outcomes     Comments:   No changes in diet, activity level, or health. She was prescribed an external cream for outer ear itching and her synthroid was increased today. She was already on this so it is unlikely to affect INR much with a small dose adjustment. No missed doses of warfarin. Patient took dosing as prescribed. No signs of clots or bleeding concerns. Patient will continue maintenance warfarin dosing. She will call back to schedule next INR. Did not want to schedule at this time.             OBJECTIVE    Recent labs: (last 7 days)     20  0912   INR 2.13*       ASSESSMENT / PLAN  INR assessment THER    Recheck INR In: 6 WEEKS    INR Location Clinic      Anticoagulation Summary  As of 2020    INR goal:   2.0-3.0   TTR:   100.0 % (5.1 mo)   INR used for dosin.13 (2020)   Warfarin maintenance plan:   2.5 mg (2.5 mg x 1) every Mon, Wed, Fri; 5 mg (2.5 mg x 2) all other days   Full warfarin instructions:   2.5 mg every Mon, Wed, Fri; 5 mg all other days   Weekly warfarin total:   27.5 mg   No change documented:   Mary Ann Meng RN   Plan last modified:   Melissa Veliz RN (3/24/2015)   Next INR check:   10/26/2020   Priority:   Maintenance    Target end date:       Indications    Cerebral infarction (H) [I63.9]  Atrial fibrillation [427.31] [I48.91]  Long term current use of anticoagulant therapy (Resolved) [Z79.01]  Atrial fibrillation  unspecified type (H) [I48.91]             Anticoagulation Episode Summary     INR check location:       Preferred lab:       Send INR reminders to:   LAZARO RAYMOND    Comments:   * 2.5mg tablets. Leaving for [place] in Dec - May: will need lab order (pt states she will not check when gone, unless she has bruising)      Anticoagulation Care Providers     Provider Role Specialty Phone number    Myrna Staples APRN CNP Referring Nurse Practitioner [phone]            See the Encounter Report to view Anticoagulation Flowsheet and Dosing Calendar (Go to Encounters tab in chart review, and find the Anticoagulation Therapy Visit)        Mary Ann Meng RN

## 2020-09-14 NOTE — PROGRESS NOTES
Radha Corona is a 70 year old year old female patient here today for rash behind ears.   .  Patient states this has been present for months.  Patient reports the following symptoms:  itching.  Patient reports the following previous treatments none.  These treatments did not work.  Patient reports the following modifying factors none.  Associated symptoms: none.  She alos notes rash on legs.  .  Patient has no other skin complaints today.  Remainder of the HPI, Meds, PMH, Allergies, FH, and SH was reviewed in chart.      Past Medical History:   Diagnosis Date     Abscess of intestine 8/3/07    ptl colectomy     Basal cell carcinoma      Displacement of lumbar intervertebral disc without myelopathy 8/3/07     GERD (gastroesophageal reflux disease)      Malignant neoplasm of breast (female), unspecified site 8/3/07     Migraine, unspecified, without mention of intractable migraine without mention of status migrainosus      Other and unspecified hyperlipidemia 8/3/07       Past Surgical History:   Procedure Laterality Date     ARTHROSCOPY KNEE RT/LT  4/2005     BREAST LUMPECTOMY, RT/LT  2/7/06    Right Breast Lumpectomy     C ORAL SURGERY PROCEDURE      wisdom teeth extraction     COLECTOMY      Reversal of Colostomy 8/05     D & C       HC SACROPLASTY  2001    Hx of Spine Surgery L4-5     HYSTERECTOMY, DIOMEDES  5/2005        Family History   Problem Relation Age of Onset     Breast Cancer Mother      Diabetes Father      Hypertension Father      Lipids Father      Neurologic Disorder Daughter         Migraines     Neurologic Disorder Brother      Cancer No family hx of         no hx skin cancer     Melanoma No family hx of        Social History     Socioeconomic History     Marital status:      Spouse name: Not on file     Number of children: Not on file     Years of education: Not on file     Highest education level: Not on file   Occupational History     Not on file   Social Needs     Financial resource  strain: Not on file     Food insecurity     Worry: Not on file     Inability: Not on file     Transportation needs     Medical: Not on file     Non-medical: Not on file   Tobacco Use     Smoking status: Never Smoker     Smokeless tobacco: Never Used   Substance and Sexual Activity     Alcohol use: Yes     Comment: wine occasionally     Drug use: No     Sexual activity: Yes     Partners: Male   Lifestyle     Physical activity     Days per week: Not on file     Minutes per session: Not on file     Stress: Not on file   Relationships     Social connections     Talks on phone: Not on file     Gets together: Not on file     Attends [employer] service: Not on file     Active member of club or organization: Not on file     Attends meetings of clubs or organizations: Not on file     Relationship status: Not on file     Intimate partner violence     Fear of current or ex partner: Not on file     Emotionally abused: Not on file     Physically abused: Not on file     Forced sexual activity: Not on file   Other Topics Concern     Parent/sibling w/ CABG, MI or angioplasty before 65F 55M? No   Social History Narrative     Not on file       Outpatient Encounter Medications as of 9/14/2020   Medication Sig Dispense Refill     acetaminophen-codeine (TYLENOL WITH CODEINE #3) 300-30 MG per tablet Take 1-2 tablets by mouth daily as needed for pain 30 tablet 0     atorvastatin (LIPITOR) 20 MG tablet TAKE 1 TABLET BY MOUTH DAILY 30 tablet 0     furosemide (LASIX) 40 MG tablet Take 1 tablet (40 mg) by mouth daily as needed (edema) 90 tablet 3     levothyroxine (SYNTHROID/LEVOTHROID) 75 MCG tablet TAKE 1 TABLET (75 MCG) BY MOUTH DAILY 30 tablet 0     metoprolol tartrate (LOPRESSOR) 50 MG tablet TAKE 1 TABLET (50 MG) BY MOUTH TWICE A  tablet 3     ORDER FOR DME Equipment being ordered: insole Arch supports 1 Box 0     oxybutynin ER (DITROPAN XL) 10 MG 24 hr tablet Take 1 tablet (10 mg) by mouth daily 90 tablet 3     prednisoLONE  acetate (PRED FORTE) 1 % ophthalmic suspension Place two drops in the right ear twice per day as needed 10 mL 3     warfarin ANTICOAGULANT (JANTOVEN ANTICOAGULANT) 2.5 MG tablet TAKE 1 TABLET (2.5MG) BY MOUTH EVERY MONDAY, WEDNESDAY, AND FRIDAY.TAKE 2 TABS (5MG) ALL OTHER DAYS OR AS DIRECTED BY ANTICOAGULATION  tablet 0     No facility-administered encounter medications on file as of 9/14/2020.              Review Of Systems  Skin: As above  Eyes: negative  Ears/Nose/Throat: negative  Respiratory: No shortness of breath, dyspnea on exertion, cough, or hemoptysis  Cardiovascular: negative  Gastrointestinal: negative  Genitourinary: negative  Musculoskeletal: negative  Neurologic: negative  Psychiatric: negative  Hematologic/Lymphatic/Immunologic: negative  Endocrine: negative      O:   NAD, WDWN, Alert & Oriented, Mood & Affect wnl, Vitals stable   Here today alone   /64 (BP Location: Left arm, Patient Position: Sitting, Cuff Size: Adult Large)   Pulse 62   SpO2 96%    General appearance normal   Vitals stable   Alert, oriented and in no acute distress      Following lymph nodes palpated: Occipital, Cervical, Supraclavicular no lad   Stasis dermatitis both legs 2+ pitting edema  BL post auricular with greasy red scaly papule        Stuck on papules and brown macules on trunk and ext   Red papules on trunk  Flesh colored papules on trunk     The remainder of the full exam was normal; the following areas were examined:  conjunctiva/lids, oral mucosa, neck, peripheral vascular system, abdomen, lymph nodes, digits/nails, eccrine and apocrine glands, scalp/hair, face, neck, chest, abdomen, buttocks, back, RUE, LUE, RLE, LLE       Eyes: Conjunctivae/lids:Normal     ENT: Lips, buccal mucosa, tongue: normal    MSK:Normal    Cardiovascular: peripheral edema 2+    Pulm: Breathing Normal    Lymph Nodes: No Head and Neck Lymphadenopathy     Neuro/Psych: Orientation:Alert and Orientedx3 ; Mood/Affect:normal        A/P:  1. Seborrheic keratosis, lentigo, angioma, dermal nevus, hx of non-melanoma skin cancer   2. seb derm  Loprox bedtime  Desonide prn   3. Stasis derm  Edema clinic referral    To reduce swelling in the leg   Don't stand for long periods.   Take regular walks.   Elevate your feet when sitting: if your legs are swollen they need to be above your hips to drain effectively.   Elevate the foot of your bed overnight.   Once the dermatitis is under control, wear special graduated compression stockings long term.    To treat the dermatitis   Dry up oozing patches with dilute vinegar on gauze as compresses.   Topical lidex cream daily  Return to clinic 8 weeks  Use vanicream daily  Try not to scratch: it keeps the dermatitis going.   Protect your skin from injury: this can result in infection or ulceration.  Vinegar is 5% acetic acid. Make a 1% solution by adding   cup of vinegar (white or brown) to 1 pint of water.      BENIGN LESIONS DISCUSSED WITH PATIENT:  I discussed the specifics of tumor, prognosis, and genetics of benign lesions.  I explained that treatment of these lesions would be purely cosmetic and not medically neccessary.  I discussed with patient different removal options including excision, cautery and /or laser.      Nature and genetics of benign skin lesions dicussed with patient.  Signs and Symptoms of skin cancer discussed with patient.  ABCDEs of melanoma reviewed with patient.  Patient encouraged to perform monthly skin exams.  UV precautions reviewed with patient.  Skin care regimen reviewed with patient: Eliminate harsh soaps, i.e. Dial, zest, irsih spring; Mild soaps such as Cetaphil or Dove sensitive skin, avoid hot or cold showers, aggressive use of emollients including vanicream, cetaphil or cerave discussed with patient.    Risks of non-melanoma skin cancer discussed with patient

## 2020-09-14 NOTE — PROGRESS NOTES
Call returned to patient. No answer on mobile. Left another message on home phone.    Mary Ann Meng RN, BSN, PHN  1:41pm

## 2020-09-14 NOTE — ADDENDUM NOTE
Addended by: GHADA RODRIGUEZ on: 9/14/2020 03:15 PM     Modules accepted: Level of Service, SmartSet

## 2020-09-14 NOTE — NURSING NOTE
"Initial /65 (BP Location: Right arm, Patient Position: Chair, Cuff Size: Adult Regular)   Pulse 76   Temp 97.6  F (36.4  C) (Tympanic)   Wt 107.5 kg (237 lb)   BMI 36.84 kg/m   Estimated body mass index is 36.84 kg/m  as calculated from the following:    Height as of 6/24/20: 1.708 m (5' 7.25\").    Weight as of this encounter: 107.5 kg (237 lb). .    Micaela Muller LPN    "

## 2020-09-14 NOTE — LETTER
9/14/2020         RE: Radha Corona  [address] [place] [id number]        Dear Colleague,    Thank you for referring your patient, Radha Corona, to the [facility]. Please see a copy of my visit note below.    Radha Corona is a 70 year old year old female patient here today for rash behind ears.   .  Patient states this has been present for months.  Patient reports the following symptoms:  itching.  Patient reports the following previous treatments none.  These treatments did not work.  Patient reports the following modifying factors none.  Associated symptoms: none.  She alos notes rash on legs.  .  Patient has no other skin complaints today.  Remainder of the HPI, Meds, PMH, Allergies, FH, and SH was reviewed in chart.      Past Medical History:   Diagnosis Date     Abscess of intestine 8/3/07    ptl colectomy     Basal cell carcinoma      Displacement of lumbar intervertebral disc without myelopathy 8/3/07     GERD (gastroesophageal reflux disease)      Malignant neoplasm of breast (female), unspecified site 8/3/07     Migraine, unspecified, without mention of intractable migraine without mention of status migrainosus      Other and unspecified hyperlipidemia 8/3/07       Past Surgical History:   Procedure Laterality Date     ARTHROSCOPY KNEE RT/LT  4/2005     BREAST LUMPECTOMY, RT/LT  2/7/06    Right Breast Lumpectomy     C ORAL SURGERY PROCEDURE      wisdom teeth extraction     COLECTOMY      Reversal of Colostomy 8/05     D & C       HC SACROPLASTY  2001    Hx of Spine Surgery L4-5     HYSTERECTOMY, DIOMEDES  5/2005        Family History   Problem Relation Age of Onset     Breast Cancer Mother      Diabetes Father      Hypertension Father      Lipids Father      Neurologic Disorder Daughter         Migraines     Neurologic Disorder Brother      Cancer No family hx of         no hx skin cancer     Melanoma No family hx of        Social History     Socioeconomic  History     Marital status:      Spouse name: Not on file     Number of children: Not on file     Years of education: Not on file     Highest education level: Not on file   Occupational History     Not on file   Social Needs     Financial resource strain: Not on file     Food insecurity     Worry: Not on file     Inability: Not on file     Transportation needs     Medical: Not on file     Non-medical: Not on file   Tobacco Use     Smoking status: Never Smoker     Smokeless tobacco: Never Used   Substance and Sexual Activity     Alcohol use: Yes     Comment: wine occasionally     Drug use: No     Sexual activity: Yes     Partners: Male   Lifestyle     Physical activity     Days per week: Not on file     Minutes per session: Not on file     Stress: Not on file   Relationships     Social connections     Talks on phone: Not on file     Gets together: Not on file     Attends Yazdanism service: Not on file     Active member of club or organization: Not on file     Attends meetings of clubs or organizations: Not on file     Relationship status: Not on file     Intimate partner violence     Fear of current or ex partner: Not on file     Emotionally abused: Not on file     Physically abused: Not on file     Forced sexual activity: Not on file   Other Topics Concern     Parent/sibling w/ CABG, MI or angioplasty before 65F 55M? No   Social History Narrative     Not on file       Outpatient Encounter Medications as of 9/14/2020   Medication Sig Dispense Refill     acetaminophen-codeine (TYLENOL WITH CODEINE #3) 300-30 MG per tablet Take 1-2 tablets by mouth daily as needed for pain 30 tablet 0     atorvastatin (LIPITOR) 20 MG tablet TAKE 1 TABLET BY MOUTH DAILY 30 tablet 0     furosemide (LASIX) 40 MG tablet Take 1 tablet (40 mg) by mouth daily as needed (edema) 90 tablet 3     levothyroxine (SYNTHROID/LEVOTHROID) 75 MCG tablet TAKE 1 TABLET (75 MCG) BY MOUTH DAILY 30 tablet 0     metoprolol tartrate (LOPRESSOR) 50 MG  tablet TAKE 1 TABLET (50 MG) BY MOUTH TWICE A  tablet 3     ORDER FOR DME Equipment being ordered: insole Arch supports 1 Box 0     oxybutynin ER (DITROPAN XL) 10 MG 24 hr tablet Take 1 tablet (10 mg) by mouth daily 90 tablet 3     prednisoLONE acetate (PRED FORTE) 1 % ophthalmic suspension Place two drops in the right ear twice per day as needed 10 mL 3     warfarin ANTICOAGULANT (JANTOVEN ANTICOAGULANT) 2.5 MG tablet TAKE 1 TABLET (2.5MG) BY MOUTH EVERY MONDAY, WEDNESDAY, AND FRIDAY.TAKE 2 TABS (5MG) ALL OTHER DAYS OR AS DIRECTED BY ANTICOAGULATION  tablet 0     No facility-administered encounter medications on file as of 9/14/2020.              Review Of Systems  Skin: As above  Eyes: negative  Ears/Nose/Throat: negative  Respiratory: No shortness of breath, dyspnea on exertion, cough, or hemoptysis  Cardiovascular: negative  Gastrointestinal: negative  Genitourinary: negative  Musculoskeletal: negative  Neurologic: negative  Psychiatric: negative  Hematologic/Lymphatic/Immunologic: negative  Endocrine: negative      O:   NAD, WDWN, Alert & Oriented, Mood & Affect wnl, Vitals stable   Here today alone   /64 (BP Location: Left arm, Patient Position: Sitting, Cuff Size: Adult Large)   Pulse 62   SpO2 96%    General appearance normal   Vitals stable   Alert, oriented and in no acute distress      Following lymph nodes palpated: Occipital, Cervical, Supraclavicular no lad   Stasis dermatitis both legs 2+ pitting edema  BL post auricular with greasy red scaly papule        Stuck on papules and brown macules on trunk and ext   Red papules on trunk  Flesh colored papules on trunk     The remainder of the full exam was normal; the following areas were examined:  conjunctiva/lids, oral mucosa, neck, peripheral vascular system, abdomen, lymph nodes, digits/nails, eccrine and apocrine glands, scalp/hair, face, neck, chest, abdomen, buttocks, back, RUE, LUE, RLE, LLE       Eyes: Conjunctivae/lids:Normal      ENT: Lips, buccal mucosa, tongue: normal    MSK:Normal    Cardiovascular: peripheral edema 2+    Pulm: Breathing Normal    Lymph Nodes: No Head and Neck Lymphadenopathy     Neuro/Psych: Orientation:Alert and Orientedx3 ; Mood/Affect:normal       A/P:  1. Seborrheic keratosis, lentigo, angioma, dermal nevus, hx of non-melanoma skin cancer   2. seb derm  Loprox bedtime  Desonide prn   3. Stasis derm  Edema clinic referral    To reduce swelling in the leg   Don't stand for long periods.   Take regular walks.   Elevate your feet when sitting: if your legs are swollen they need to be above your hips to drain effectively.   Elevate the foot of your bed overnight.   Once the dermatitis is under control, wear special graduated compression stockings long term.    To treat the dermatitis   Dry up oozing patches with dilute vinegar on gauze as compresses.   Topical lidex cream daily  Return to clinic 8 weeks  Use vanicream daily  Try not to scratch: it keeps the dermatitis going.   Protect your skin from injury: this can result in infection or ulceration.  Vinegar is 5% acetic acid. Make a 1% solution by adding   cup of vinegar (white or brown) to 1 pint of water.      BENIGN LESIONS DISCUSSED WITH PATIENT:  I discussed the specifics of tumor, prognosis, and genetics of benign lesions.  I explained that treatment of these lesions would be purely cosmetic and not medically neccessary.  I discussed with patient different removal options including excision, cautery and /or laser.      Nature and genetics of benign skin lesions dicussed with patient.  Signs and Symptoms of skin cancer discussed with patient.  ABCDEs of melanoma reviewed with patient.  Patient encouraged to perform monthly skin exams.  UV precautions reviewed with patient.  Skin care regimen reviewed with patient: Eliminate harsh soaps, i.e. Dial, zest, irsih spring; Mild soaps such as Cetaphil or Dove sensitive skin, avoid hot or cold showers, aggressive use  of emollients including vanicream, cetaphil or cerave discussed with patient.    Risks of non-melanoma skin cancer discussed with patient       Again, thank you for allowing me to participate in the care of your patient.        Sincerely,        Edy Dove MD

## 2020-09-14 NOTE — PATIENT INSTRUCTIONS
Per physician's instructions    Can use desonide (DESOWEN) 0.05 % external cream to both ear canals twice daily for 7 days, then as needed for itching

## 2020-09-14 NOTE — PROGRESS NOTES
Anticoagulation Management    Unable to reach Bernie today.    Today's INR result of 2.13 is therapeutic (goal INR of 2.0-3.0).  Result received from: Clinic Lab    Follow up required to confirm warfarin dose taken and assess for changes    VM left to call [facility] back when able      Anticoagulation clinic to follow up    Mary Ann Meng RN

## 2020-09-14 NOTE — LETTER
9/14/2020         RE: Radha Corona  [address] [place] [id number]        Dear Colleague,    Thank you for referring your patient, Radha Corona, to the [facility]. Please see a copy of my visit note below.    Chief Complaint   Patient presents with     Ear Problem     c/o itchy ears since 3/2020- has tried ear drops with no relief     History of Present Illness   Radha Corona is a 70 year old female who presents to me today for ear evaluation. The patient has issues with ear pruritis.  Symptoms after traveling to [place] and [place] last fall.  She was then seen in [place] and placed on antibiotic eardrops with resolution of symptoms.  [place] and again began to have symptoms of ear pruritus.  She has symptoms daily.  No otalgia.  No otorrhea, bloody otorrhea.  No hearing change or hearing concerns.  No previous history of ear surgeries.  He recently saw dermatology and they commended treatment with topical steroids for some dermatitis behind her ear as well.  She denies a history of significant dermatitis or eczema.    Past Medical History  Patient Active Problem List   Diagnosis     Lumbar Radiculopathy- s/p fusion at L4-5.     CTS (carpal tunnel syndrome)     Neuropathy     Cerebral infarction (H)     Osteoarthritis     Arthropathy     Grave's disease     Edema leg     Postablative hypothyroidism     Advanced directives, counseling/discussion     Hyperlipidemia LDL goal <100     Breast cancer (H)     Chronic atrial fibrillation (H)     Generalized osteoarthrosis, unspecified site     HX: breast cancer     Seasonal allergies     Atrial fibrillation [427.31]     Migraine without status migrainosus, not intractable, unspecified migraine type     Obesity (BMI 35.0-39.9) with comorbidity (H)     Atrial fibrillation, unspecified type (H)     Current Medications    Current Outpatient Medications:      acetaminophen-codeine (TYLENOL WITH  CODEINE #3) 300-30 MG per tablet, Take 1-2 tablets by mouth daily as needed for pain, Disp: 30 tablet, Rfl: 0     atorvastatin (LIPITOR) 20 MG tablet, TAKE 1 TABLET BY MOUTH DAILY, Disp: 30 tablet, Rfl: 0     ciclopirox (LOPROX) 0.77 % cream, Apply topically At Bedtime Behind ears, Disp: 60 g, Rfl: 3     desonide (DESOWEN) 0.05 % external cream, Apply topically daily Behind ears, Disp: 60 g, Rfl: 3     fluocinonide (LIDEX) 0.05 % external cream, Apply sparingly to affected area twice daily as needed.  Do not apply to face. TO LEGS, Disp: 120 g, Rfl: 3     furosemide (LASIX) 40 MG tablet, Take 1 tablet (40 mg) by mouth daily as needed (edema), Disp: 90 tablet, Rfl: 3     levothyroxine (SYNTHROID/LEVOTHROID) 88 MCG tablet, Take 1 tablet (88 mcg) by mouth daily, Disp: 90 tablet, Rfl: 3     metoprolol tartrate (LOPRESSOR) 50 MG tablet, TAKE 1 TABLET (50 MG) BY MOUTH TWICE A DAY, Disp: 180 tablet, Rfl: 3     ORDER FOR DME, Equipment being ordered: insole Arch supports, Disp: 1 Box, Rfl: 0     oxybutynin ER (DITROPAN XL) 10 MG 24 hr tablet, Take 1 tablet (10 mg) by mouth daily, Disp: 90 tablet, Rfl: 3     prednisoLONE acetate (PRED FORTE) 1 % ophthalmic suspension, Place two drops in the right ear twice per day as needed, Disp: 10 mL, Rfl: 3     warfarin ANTICOAGULANT (JANTOVEN ANTICOAGULANT) 2.5 MG tablet, TAKE 1 TABLET (2.5MG) BY MOUTH EVERY MONDAY, WEDNESDAY, AND FRIDAY.TAKE 2 TABS (5MG) ALL OTHER DAYS OR AS DIRECTED BY ANTICOAGULATION CLI, Disp: 145 tablet, Rfl: 0    Allergies  Allergies   Allergen Reactions     Penicillins Hives     Simvastatin Cramps     Tetracycline Hives       Social History  Social History     Socioeconomic History     Marital status:      Spouse name: Not on file     Number of children: Not on file     Years of education: Not on file     Highest education level: Not on file   Occupational History     Not on file   Social Needs     Financial resource strain: Not on file     Food insecurity      Worry: Not on file     Inability: Not on file     Transportation needs     Medical: Not on file     Non-medical: Not on file   Tobacco Use     Smoking status: Never Smoker     Smokeless tobacco: Never Used   Substance and Sexual Activity     Alcohol use: Yes     Comment: wine occasionally     Drug use: No     Sexual activity: Yes     Partners: Male   Lifestyle     Physical activity     Days per week: Not on file     Minutes per session: Not on file     Stress: Not on file   Relationships     Social connections     Talks on phone: Not on file     Gets together: Not on file     Attends [employer] service: Not on file     Active member of club or organization: Not on file     Attends meetings of clubs or organizations: Not on file     Relationship status: Not on file     Intimate partner violence     Fear of current or ex partner: Not on file     Emotionally abused: Not on file     Physically abused: Not on file     Forced sexual activity: Not on file   Other Topics Concern     Parent/sibling w/ CABG, MI or angioplasty before 65F 55M? No   Social History Narrative     Not on file       Family History  Family History   Problem Relation Age of Onset     Breast Cancer Mother      Diabetes Father      Hypertension Father      Lipids Father      Neurologic Disorder Daughter         Migraines     Neurologic Disorder Brother      Cancer No family hx of         no hx skin cancer     Melanoma No family hx of        Review of Systems  As per HPI and PMHx, otherwise 7 system review of the head and neck negative.    Physical Exam  /65 (BP Location: Right arm, Patient Position: Chair, Cuff Size: Adult Regular)   Pulse 76   Temp 97.6  F (36.4  C) (Tympanic)   Wt 107.5 kg (237 lb)   BMI 36.84 kg/m    GENERAL: Patient is a pleasant, cooperative 70 year old female in no acute distress.  HEAD: Normocephalic, atraumatic.  Hair and scalp are normal.  EYES: Pupils are equal, round, reactive to light and accommodation.   Extraocular movements are intact.  The sclera nonicteric without injection.  The extraocular structures are normal.  EARS: See below.  NEUROLOGIC: Cranial nerves II through XII are grossly intact.  Voice is strong.  Facial nerve examination incomplete due to the patient wearing a mask.  CARDIOVASCULAR: Extremities are warm and well-perfused.  No significant peripheral edema.  RESPIRATORY: Patient has nonlabored breathing without cough, wheeze, stridor.  PSYCHIATRIC: Patient is alert and oriented.  Mood and affect appear normal.  SKIN: Warm and dry.  No scalp, face, or neck lesions noted.    Physical Exam and Procedure    EARS: Normal shape and symmetry.  No tenderness when palpating the mastoid or tragal areas bilaterally.  Careful examination did reveal some slight signs of dermatitis with some flaking of her skin of the external auditory canals more so on the left.  No granulation or otorrhea.  No signs of infection.  The ears were then examined under the otic binocular microscope.  Otoscopic exam on the right reveals slight inflammation and some hypertrophic skin around the lateral third of the external auditory canal opening consistent with dermatitis/eczema.  The remainder of the ear canal was unremarkable.  The right tympanic membrane was round, intact without evidence of effusion.  No retraction, granulation, drainage, or evidence of cholesteatoma.      Attention was turned to the left ear.  Otoscopic exam on the left reveals slight inflammation and some hypertrophic skin around the lateral third of the external auditory canal opening consistent with dermatitis/eczema.  The remainder of the ear canal was unremarkable.  The left tympanic membrane was round, intact without evidence of effusion.  No retraction, granulation, drainage, or evidence of cholesteatoma.      Assessment and Plan     ICD-10-CM    1. Eczema of external ear, bilateral  H60.543 Microscopy, Binocular   2. Ear itching  L29.9 Microscopy,  Binocular     It was my pleasure seeing Radha Corona today in clinic.  The patient appears to have bilateral external auditory canal eczema/inflammation.  This was somewhat worse on the left-hand side with some scaling and crusting.  We discussed using her topical desonide cream in her ear canal that she was prescribed by dermatology.  I recommend using it twice daily for 1 week and then as needed.  The patient knows to contact me if this is not helping her symptoms.  We discussed trying not to itch the area as itching tends to increase inflammation and then leads to more symptoms.  Her ear canal and eardrum otherwise appeared healthy and normal without signs of inflammation or infection.  There is no signs of middle ear effusion.  I recommend observation.  The patient return to clinic as needed problems or concerns.    Mc Villalobos MD  Department of Otolarygology-Head and Neck Surgery  [facility]       Again, thank you for allowing me to participate in the care of your patient.        Sincerely,        Mc Villalobos MD

## 2020-09-17 DIAGNOSIS — E03.9 HYPOTHYROIDISM, UNSPECIFIED TYPE: ICD-10-CM

## 2020-09-17 RX ORDER — LEVOTHYROXINE SODIUM 75 UG/1
75 TABLET ORAL DAILY
Qty: 30 TABLET | Refills: 0 | OUTPATIENT
Start: 2020-09-17

## 2020-09-23 ENCOUNTER — TRANSFERRED RECORDS (OUTPATIENT)
Dept: HEALTH INFORMATION MANAGEMENT | Facility: CLINIC | Age: 70
End: 2020-09-23

## 2020-09-29 DIAGNOSIS — E78.5 HYPERLIPIDEMIA LDL GOAL <70: ICD-10-CM

## 2020-09-29 NOTE — TELEPHONE ENCOUNTER
"Requested Prescriptions   Pending Prescriptions Disp Refills     atorvastatin (LIPITOR) 20 MG tablet [Pharmacy Med Name: ATORVASTATIN 20MG TABLET] 30 tablet 0     Sig: TAKE 1 TABLET BY MOUTH DAILY       Statins Protocol Passed - 9/29/2020  1:07 PM        Passed - LDL on file in past 12 months     Recent Labs   Lab Test 09/14/20  0912   LDL 74             Passed - No abnormal creatine kinase in past 12 months     No lab results found.             Passed - Recent (12 mo) or future (30 days) visit within the authorizing provider's specialty     Patient has had an office visit with the authorizing provider or a provider within the authorizing providers department within the previous 12 mos or has a future within next 30 days. See \"Patient Info\" tab in inbasket, or \"Choose Columns\" in Meds & Orders section of the refill encounter.              Passed - Medication is active on med list        Passed - Patient is age 18 or older        Passed - No active pregnancy on record        Passed - No positive pregnancy test in past 12 months             "

## 2020-10-01 RX ORDER — ATORVASTATIN CALCIUM 20 MG/1
TABLET, FILM COATED ORAL
Qty: 90 TABLET | Refills: 2 | Status: SHIPPED | OUTPATIENT
Start: 2020-10-01

## 2020-10-02 ENCOUNTER — TRANSFERRED RECORDS (OUTPATIENT)
Dept: HEALTH INFORMATION MANAGEMENT | Facility: CLINIC | Age: 70
End: 2020-10-02

## 2020-10-05 ENCOUNTER — TELEPHONE (OUTPATIENT)
Dept: FAMILY MEDICINE | Facility: CLINIC | Age: 70
End: 2020-10-05

## 2020-10-05 NOTE — TELEPHONE ENCOUNTER
Call received from [facility]. She had a biopsy rt breast. Invasive lobular carcinoma. They will contact her and set her up to see a surgeon.  Amaya Herrera RN

## 2020-10-14 ENCOUNTER — NURSE TRIAGE (OUTPATIENT)
Dept: NURSING | Facility: CLINIC | Age: 70
End: 2020-10-14

## 2020-10-14 NOTE — TELEPHONE ENCOUNTER
Patient calling requesting COVID 19 testing for surgery she will be having at [facility].     Advised patient to contact surgeon to order COVID 19 testing. [facility] is not currently offering COVID 19 testing for pre op surgeries outside of [place].    Melinda Armando RN  [facility]      Additional Information    Negative: [1] Caller is not with the adult (patient) AND [2] reporting urgent symptoms    Negative: Lab result questions    Negative: Medication questions    Negative: Caller can't be reached by phone    Negative: Caller has already spoken to PCP or another triager    Negative: RN needs further essential information from caller in order to complete triage    Negative: Requesting regular office appointment    Negative: [1] Caller requesting NON-URGENT health information AND [2] PCP's office is the best resource    Negative: Health Information question, no triage required and triager able to answer question    General information question, no triage required and triager able to answer question    Protocols used: INFORMATION ONLY CALL-A-

## 2020-10-16 ENCOUNTER — TRANSFERRED RECORDS (OUTPATIENT)
Dept: HEALTH INFORMATION MANAGEMENT | Facility: CLINIC | Age: 70
End: 2020-10-16

## 2020-10-20 ENCOUNTER — TRANSFERRED RECORDS (OUTPATIENT)
Dept: HEALTH INFORMATION MANAGEMENT | Facility: CLINIC | Age: 70
End: 2020-10-20

## 2020-10-20 DIAGNOSIS — I87.2 STASIS DERMATITIS OF BOTH LEGS: ICD-10-CM

## 2020-10-20 DIAGNOSIS — L82.1 SEBORRHEIC KERATOSIS: ICD-10-CM

## 2020-10-20 DIAGNOSIS — D18.01 ANGIOMA OF SKIN: ICD-10-CM

## 2020-10-20 DIAGNOSIS — Z85.828 HISTORY OF SKIN CANCER: ICD-10-CM

## 2020-10-20 DIAGNOSIS — L21.9 DERMATITIS, SEBORRHEIC: ICD-10-CM

## 2020-10-20 DIAGNOSIS — L81.4 LENTIGO: ICD-10-CM

## 2020-10-20 DIAGNOSIS — D23.9 DERMAL NEVUS: ICD-10-CM

## 2020-10-20 NOTE — TELEPHONE ENCOUNTER
Requested Prescriptions   Pending Prescriptions Disp Refills     fluocinonide (LIDEX) 0.05 % external cream 120 g 3     Sig: Apply sparingly to affected area twice daily as needed.  Do not apply to face. TO LEGS       There is no refill protocol information for this order        Last office visit: 9/14/2020 with prescribing provider:  Dr. Dove   Future Office Visit:   Next 5 appointments (look out 90 days)    Oct 27, 2020  1:00 PM  Pre-Op physical with [facility] ([facility])  Arrive at: Clinic [facility] [address] [facility] [id number] [phone]               Denise Behrendt  Specialty CSS

## 2020-10-21 RX ORDER — FLUOCINONIDE 0.5 MG/G
CREAM TOPICAL
Qty: 120 G | Refills: 3 | Status: SHIPPED | OUTPATIENT
Start: 2020-10-21

## 2020-10-27 ENCOUNTER — ANTICOAGULATION THERAPY VISIT (OUTPATIENT)
Dept: ANTICOAGULATION | Facility: CLINIC | Age: 70
End: 2020-10-27

## 2020-10-27 ENCOUNTER — TELEPHONE (OUTPATIENT)
Dept: FAMILY MEDICINE | Facility: CLINIC | Age: 70
End: 2020-10-27

## 2020-10-27 ENCOUNTER — OFFICE VISIT (OUTPATIENT)
Dept: FAMILY MEDICINE | Facility: CLINIC | Age: 70
End: 2020-10-27
Payer: COMMERCIAL

## 2020-10-27 VITALS
HEART RATE: 73 BPM | OXYGEN SATURATION: 98 % | TEMPERATURE: 97.5 F | SYSTOLIC BLOOD PRESSURE: 114 MMHG | BODY MASS INDEX: 37.28 KG/M2 | WEIGHT: 239.8 LBS | DIASTOLIC BLOOD PRESSURE: 68 MMHG

## 2020-10-27 DIAGNOSIS — C50.911 MALIGNANT NEOPLASM OF RIGHT FEMALE BREAST, UNSPECIFIED ESTROGEN RECEPTOR STATUS, UNSPECIFIED SITE OF BREAST (H): ICD-10-CM

## 2020-10-27 DIAGNOSIS — E03.9 HYPOTHYROIDISM, UNSPECIFIED TYPE: ICD-10-CM

## 2020-10-27 DIAGNOSIS — I63.9 CEREBRAL INFARCTION (H): ICD-10-CM

## 2020-10-27 DIAGNOSIS — E66.01 MORBID OBESITY (H): ICD-10-CM

## 2020-10-27 DIAGNOSIS — Z01.818 PREOP GENERAL PHYSICAL EXAM: Primary | ICD-10-CM

## 2020-10-27 DIAGNOSIS — I48.91 ATRIAL FIBRILLATION (H): ICD-10-CM

## 2020-10-27 DIAGNOSIS — I48.91 ATRIAL FIBRILLATION, UNSPECIFIED TYPE (H): ICD-10-CM

## 2020-10-27 DIAGNOSIS — I48.20 CHRONIC ATRIAL FIBRILLATION (H): ICD-10-CM

## 2020-10-27 LAB
ERYTHROCYTE [DISTWIDTH] IN BLOOD BY AUTOMATED COUNT: 13 % (ref 10–15)
HCT VFR BLD AUTO: 44.7 % (ref 35–47)
HGB BLD-MCNC: 14.4 G/DL (ref 11.7–15.7)
INR PPP: 2.27 (ref 0.86–1.14)
MCH RBC QN AUTO: 30.6 PG (ref 26.5–33)
MCHC RBC AUTO-ENTMCNC: 32.2 G/DL (ref 31.5–36.5)
MCV RBC AUTO: 95 FL (ref 78–100)
PLATELET # BLD AUTO: 282 10E9/L (ref 150–450)
RBC # BLD AUTO: 4.7 10E12/L (ref 3.8–5.2)
TSH SERPL DL<=0.005 MIU/L-ACNC: 3.81 MU/L (ref 0.4–4)
WBC # BLD AUTO: 5.2 10E9/L (ref 4–11)

## 2020-10-27 PROCEDURE — 84443 ASSAY THYROID STIM HORMONE: CPT | Performed by: NURSE PRACTITIONER

## 2020-10-27 PROCEDURE — 99207 PR NO CHARGE NURSE ONLY: CPT

## 2020-10-27 PROCEDURE — 85027 COMPLETE CBC AUTOMATED: CPT | Performed by: NURSE PRACTITIONER

## 2020-10-27 PROCEDURE — 99214 OFFICE O/P EST MOD 30 MIN: CPT | Performed by: INTERNAL MEDICINE

## 2020-10-27 PROCEDURE — 85610 PROTHROMBIN TIME: CPT | Performed by: NURSE PRACTITIONER

## 2020-10-27 NOTE — TELEPHONE ENCOUNTER
Could somebody please try to contact Bernie's cardiologist Dr. Key at [facility]?  She would like to get his input on whether or not she should bridge with Lovenox while holding the warfarin for her upcoming procedures (mastectomy, EGD, colonoscopy).  Her CHADS-Vasc score is 4, and Lovenox is typically recommended for a score of 5 or more, but she is nervous to be off the warfarin given her stroke history.  It appears from an encounter on 10/21 in [facility] that her surgeon already tried to contact cardiology about this, but I don't see that the question was answered.  I do not need to talk to Dr. Key directly; okay to communicate recommendations to RN.    Thanks,  Rio Palacios MD

## 2020-10-27 NOTE — TELEPHONE ENCOUNTER
Contacted Dr. Key's Office, left message for IMAN Romo with [facility] to call back to clinic.

## 2020-10-27 NOTE — LETTER
October 28, 2020      Radha AVILA Cj  [address] [place] [id number]        Dear MsLei,    We are writing to inform you of your test results.    TSH is normal     Resulted Orders   **TSH with free T4 reflex FUTURE anytime   Result Value Ref Range    TSH 3.81 0.40 - 4.00 mU/L       If you have any questions or concerns, please call the clinic at the number listed above.       Sincerely,        Rio Palacios MD

## 2020-10-27 NOTE — TELEPHONE ENCOUNTER
10/27/20    Noted. Thank you!    Sher Tadeo RN, BSN, PHN  Anticoagulation Clinic   [phone]

## 2020-10-27 NOTE — PROGRESS NOTES
ANTICOAGULATION FOLLOW-UP CLINIC VISIT    Patient Name:  Radha Corona  Date:  10/27/2020  Contact Type:  Telephone    SUBJECTIVE:  Patient Findings     Positives:  Upcoming invasive procedure    Comments:  Upcoming colonoscopy.   TE sent to cardiology to determine bridging needs.  Patient is aware to hold her Coumadin, but will need to be contacted for bridging when determined.        Clinical Outcomes     Negatives:  Major bleeding event, Thromboembolic event, Anticoagulation-related hospital admission, Anticoagulation-related ED visit, Anticoagulation-related fatality    Comments:  Upcoming colonoscopy.   TE sent to cardiology to determine bridging needs.  Patient is aware to hold her Coumadin, but will need to be contacted for bridging when determined.           OBJECTIVE    Recent labs: (last 7 days)     10/27/20  1034   INR 2.27*       ASSESSMENT / PLAN  INR assessment THER    Recheck INR In: 2 WEEKS    INR Location Outside lab      Anticoagulation Summary  As of 10/27/2020    INR goal:  2.0-3.0   TTR:  100.0 % (5.1 mo)   INR used for dosin.27 (10/27/2020)   Warfarin maintenance plan:  2.5 mg (2.5 mg x 1) every Mon, Wed, Fri; 5 mg (2.5 mg x 2) all other days   Full warfarin instructions:  10/30: Hold; 10/31: Hold; : Hold; : Hold; 11/3: Hold; Otherwise 2.5 mg every Mon, Wed, Fri; 5 mg all other days   Weekly warfarin total:  27.5 mg   Plan last modified:  Melissa Veliz RN (3/24/2015)   Next INR check:  2020   Priority:  Maintenance   Target end date:  Indefinite    Indications    Cerebral infarction (H) [I63.9]  Atrial fibrillation [427.31] [I48.91]  Long term current use of anticoagulant therapy (Resolved) [Z79.01]  Atrial fibrillation  unspecified type (H) [I48.91]             Anticoagulation Episode Summary     INR check location:      Preferred lab:      Send INR reminders to:  LAZARO RAYMOND    Comments:  * 2.5mg tablets. Leaving for [place] in Dec - May: will need lab  order (pt states she will not check when gone, unless she has bruising)      Anticoagulation Care Providers     Provider Role Specialty Phone number    Jhoan, Myrna Leahy, APRN CNP Referring Nurse Practitioner [phone]            See the Encounter Report to view Anticoagulation Flowsheet and Dosing Calendar (Go to Encounters tab in chart review, and find the Anticoagulation Therapy Visit)        Sher Tadeo RN

## 2020-10-27 NOTE — PROGRESS NOTES
2:47 PM     Per cardiology, patient does not need to bridge with Lovenox for the upcoming Colonoscopy.    Sher Tadeo RN, BSN, PHN  Anticoagulation Clinic   [phone]

## 2020-10-27 NOTE — PROGRESS NOTES
[facility]  [address] [address]  Phone: [phone]  Primary Provider: Myrna Staples  Pre-op Performing Provider: NATASHA PISANO    PREOPERATIVE EVALUATION:  Today's date: 10/27/2020    Radha Corona is a 70 year old female who presents for a preoperative evaluation.  Chief Complaint   Patient presents with     Pre-Op Exam     DOS 11/4/20 colonoscopy, endoscopy and mastectomy TBD     Surgical Information:  Surgery/Procedure: colonoscopy/endoscopy   Mastectomy @ [facility] - date TBD  Surgery Location: [facility]   Surgeon: Dr. Baca  Surgery Date: 11/4/2020  Time of Surgery: 10:15  Where patient plans to recover: At home with family  Fax number for surgical facility: [phone]    Type of Anesthesia Anticipated: General and MAC    Subjective     HPI related to upcoming procedure:     Bernie was recently diagnosed with recurrent R breast cancer and mastectomy is planned on the right and possibly prophylactically on the left.    EGD and colonoscopy are also planned due to abnormal findings on the PET scan.     She is otherwise feeling well, though stressed.        Preop Questions 10/27/2020   1. Have you ever had a heart attack or stroke? YES - starla in 2009   2. Have you ever had surgery on your heart or blood vessels, such as a stent placement, a coronary artery bypass, or surgery on an artery in your head, neck, heart, or legs? No   3. Do you have chest pain with activity? No   4. Do you have a history of  heart failure? No   5. Do you currently have a cold, bronchitis or symptoms of other infection? No   6. Do you have a cough, shortness of breath, or wheezing? No   7. Do you or anyone in your family have previous history of blood clots? YES - sister w/ history of blood clots    8. Do you or does anyone in your family have a serious bleeding problem such as prolonged bleeding following surgeries or cuts? No   9. Have you ever had  problems with anemia or been told to take iron pills? No   10. Have you had any abnormal blood loss such as black, tarry or bloody stools, or abnormal vaginal bleeding? No   11. Have you ever had a blood transfusion? YES - 2006   11a. Have you ever had a transfusion reaction? No   12. Are you willing to have a blood transfusion if it is medically needed before, during, or after your surgery? Yes   13. Have you or any of your relatives ever had problems with anesthesia? YES - personal history w/ problems, extreme vomiting   14. Do you have sleep apnea, excessive snoring or daytime drowsiness? No   15. Do you have any artifical heart valves or other implanted medical devices like a pacemaker, defibrillator, or continuous glucose monitor? No   16. Do you have artificial joints? No   17. Are you allergic to latex? No     Health Care Directive:  Patient does not have a Health Care Directive or Living Will: Advance Directive received and scanned. Click on Code in the patient header to view.    Preoperative Review of :   reviewed - no record of controlled substances prescribed. in past year      Status of Chronic Conditions:  See problem list for active medical problems.  Problems all longstanding and stable, except as noted/documented.  See ROS for pertinent symptoms related to these conditions.      Review of Systems  Constitutional, neuro, ENT, endocrine, pulmonary, cardiac, gastrointestinal, genitourinary, musculoskeletal, integument and psychiatric systems are negative, except as otherwise noted plus some occasional constipation    Patient Active Problem List    Diagnosis Date Noted     Atrial fibrillation, unspecified type (H) 07/01/2020     Priority: Medium     Obesity (BMI 35.0-39.9) with comorbidity (H) 08/15/2019     Priority: Medium     Migraine without status migrainosus, not intractable, unspecified migraine type 03/09/2016     Priority: Medium     Atrial fibrillation [427.31] 03/24/2015     Priority:  Medium     Seasonal allergies 04/10/2014     Priority: Medium     Generalized osteoarthrosis, unspecified site 02/18/2014     Priority: Medium     HX: breast cancer 02/18/2014     Priority: Medium     Chronic atrial fibrillation (H) 01/15/2013     Priority: Medium     S/p cardioversion in 2011       Breast cancer (H) 05/14/2012     Priority: Medium     Dx with T2N0 infiltrating ductal lobular carcinoma, ER/NE positive, HER-2 receptor negative, in 2006  S/p right lumpectomy followed by chemo and radiation therapy.  Was on tamoxifen x 3 years, then changed to arimidex for 4 months before being dx with stroke (so arimidex was stopped)       Advanced directives, counseling/discussion 05/01/2012     Priority: Medium     Patient has completed an Advance/Health Care Directive (HCD), to bring in copy to be scanned into Epic. Maddie Baker LPN 5/1/2012 10:36 AM      Maddie Baker  May 1, 2012         Hyperlipidemia LDL goal <100 05/01/2012     Priority: Medium     CHOL      155   5/1/2012  HDL       53   5/1/2012  LDL       84   5/1/2012  TRIG       90   5/1/2012  CHOLHDLRATIO      3.0   5/1/2012  On simvastatin 40mg       Postablative hypothyroidism 11/14/2011     Priority: Medium     Edema leg 07/15/2010     Priority: Medium     Worse when in atrial fib.       Osteoarthritis 12/09/2009     Priority: Medium     Arthropathy 12/09/2009     Priority: Medium     Grave's disease 12/09/2009     Priority: Medium     Follows with Dr. Tejada  TSH     7.55   5/1/2012 on 37.5mcg /day  IMO update changed this record. Please review for accuracy       Cerebral infarction (H) 10/27/2009     Priority: Medium     2009.  Residual mild left sided weakness and foot drop  Diagnosis updated by automated process. Provider to review and confirm.       Lumbar Radiculopathy- s/p fusion at L4-5. 05/20/2008     Priority: Medium     2006       CTS (carpal tunnel syndrome) 05/20/2008     Priority: Medium     Bilateral.  Wears splints at night        Neuropathy 05/20/2008     Priority: Medium     Right foot and leg, drop foot        Past Medical History:   Diagnosis Date     Abscess of intestine 8/3/07    ptl colectomy     Basal cell carcinoma      Displacement of lumbar intervertebral disc without myelopathy 8/3/07     GERD (gastroesophageal reflux disease)      Malignant neoplasm of breast (female), unspecified site 8/3/07     Migraine, unspecified, without mention of intractable migraine without mention of status migrainosus      Other and unspecified hyperlipidemia 8/3/07     Past Surgical History:   Procedure Laterality Date     ARTHROSCOPY KNEE RT/LT  4/2005     BREAST LUMPECTOMY, RT/LT  2/7/06    Right Breast Lumpectomy     C ORAL SURGERY PROCEDURE      wisdom teeth extraction     COLECTOMY      Reversal of Colostomy 8/05     D & C       HC SACROPLASTY  2001    Hx of Spine Surgery L4-5     HYSTERECTOMY, DIOMEDES  5/2005     Current Outpatient Medications   Medication Sig Dispense Refill     atorvastatin (LIPITOR) 20 MG tablet TAKE 1 TABLET BY MOUTH DAILY 90 tablet 2     furosemide (LASIX) 40 MG tablet Take 1 tablet (40 mg) by mouth daily as needed (edema) 90 tablet 3     levothyroxine (SYNTHROID/LEVOTHROID) 88 MCG tablet Take 1 tablet (88 mcg) by mouth daily 90 tablet 3     metoprolol tartrate (LOPRESSOR) 50 MG tablet TAKE 1 TABLET (50 MG) BY MOUTH TWICE A  tablet 3     oxybutynin ER (DITROPAN XL) 10 MG 24 hr tablet Take 1 tablet (10 mg) by mouth daily 90 tablet 3     warfarin ANTICOAGULANT (JANTOVEN ANTICOAGULANT) 2.5 MG tablet TAKE 1 TABLET EVERY MONDAY WEDNESDAY FRIDAY AND TAKE 2 TABLETS ALL OTHER DAYS OR AS DIRECTED BY INR CLINIC 145 tablet 0     acetaminophen-codeine (TYLENOL WITH CODEINE #3) 300-30 MG per tablet Take 1-2 tablets by mouth daily as needed for pain 30 tablet 0     ciclopirox (LOPROX) 0.77 % cream Apply topically At Bedtime Behind ears 60 g 3     desonide (DESOWEN) 0.05 % external cream Apply topically daily Behind ears 60 g 3      fluocinonide (LIDEX) 0.05 % external cream Apply sparingly to affected area twice daily as needed.  Do not apply to face. TO LEGS 120 g 3     ORDER FOR DME Equipment being ordered: insole Arch supports 1 Box 0     prednisoLONE acetate (PRED FORTE) 1 % ophthalmic suspension Place two drops in the right ear twice per day as needed (Patient not taking: Reported on 10/27/2020) 10 mL 3       Allergies   Allergen Reactions     Penicillins Hives     Simvastatin Cramps     Tetracycline Hives        Social History     Tobacco Use     Smoking status: Never Smoker     Smokeless tobacco: Never Used   Substance Use Topics     Alcohol use: Yes     Comment: wine occasionally     Family History   Problem Relation Age of Onset     Breast Cancer Mother      Diabetes Father      Hypertension Father      Lipids Father      Neurologic Disorder Daughter         Migraines     Neurologic Disorder Brother      Cancer No family hx of         no hx skin cancer     Melanoma No family hx of      History   Drug Use No         Objective     /68 (BP Location: Left arm, Patient Position: Sitting, Cuff Size: Adult Large)   Pulse 73   Temp 97.5  F (36.4  C) (Tympanic)   Wt 108.8 kg (239 lb 12.8 oz)   SpO2 98%   BMI 37.28 kg/m      Physical Exam      GENERAL APPEARANCE: healthy, alert and no distress     HENT: normal ear canals and TMs, nose and mouth without ulcers or lesions     NECK: no adenopathy, no asymmetry, masses, or scars     RESP: lungs clear to auscultation - no rales, rhonchi or wheezes     CV: irregularly irregular, normal S1 S2, no S3 or S4 and no murmur, click or rub -     ABDOMEN:  soft, nontender, no HSM or masses and bowel sounds normal     MS: extremities normal- no gross deformities noted, no evidence of inflammation in joints, mild lower leg edema       NEURO: mentation intact and speech normal     PSYCH: mentation appears normal. and affect normal/bright     LYMPHATICS: No cervical or supraclavicular nodes     Recent  Labs   Lab Test 09/14/20  0912 06/24/20  0941 08/15/19  1046 08/15/19  1046   INR 2.13* 2.40*   < >  --    NA  --   --   --  139   POTASSIUM  --   --   --  4.7   CR  --   --   --  0.89    < > = values in this interval not displayed.        Diagnostics:  Recent Results (from the past 24 hour(s))   INR    Collection Time: 10/27/20 10:34 AM   Result Value Ref Range    INR 2.27 (H) 0.86 - 1.14   CBC with platelets    Collection Time: 10/27/20 10:34 AM   Result Value Ref Range    WBC 5.2 4.0 - 11.0 10e9/L    RBC Count 4.70 3.8 - 5.2 10e12/L    Hemoglobin 14.4 11.7 - 15.7 g/dL    Hematocrit 44.7 35.0 - 47.0 %    MCV 95 78 - 100 fl    MCH 30.6 26.5 - 33.0 pg    MCHC 32.2 31.5 - 36.5 g/dL    RDW 13.0 10.0 - 15.0 %    Platelet Count 282 150 - 450 10e9/L   **TSH with free T4 reflex FUTURE anytime    Collection Time: 10/27/20 10:34 AM   Result Value Ref Range    TSH 3.81 0.40 - 4.00 mU/L      No EKG required, no history of coronary heart disease, significant arrhythmia, peripheral arterial disease or other structural heart disease.    Revised Cardiac Risk Index (RCRI):  The patient has the following serious cardiovascular risks for perioperative complications:   - No serious cardiac risks = 0 points     RCRI Interpretation: 0 points: Class I (very low risk - 0.4% complication rate)         Assessment & Plan   The proposed surgical procedure is considered LOW risk.    Preop general physical exam  and  Malignant neoplasm of right female breast, unspecified estrogen receptor status, unspecified site of breast (H)    - CBC with platelets    Chronic atrial fibrillation (H)    Rate controlled, discussed warfarin hold.  Both her cardiologist and I feel it should be fine to hold the warfarin without bridging.  Communicated this to [facility] clinic.      - INR  - CBC with platelets    Morbid obesity (H)  BMI of 37    Hypothyroidism, unspecified type    TSH wnl    - **TSH with free T4 reflex FUTURE anytime       Risks and  Recommendations:  The patient has the following additional risks and recommendations for perioperative complications:   - No identified additional risk factors other than previously addressed    Medication Instructions:   - warfarin: Thromboembolic risk is moderate (4-10%/year). HOLD warfarin 5 days. Thromboembolic risk is moderate (4-10%/year). HOLD warfarin 5 days.   - Bridging not done due to risk outweighing benefits    - Diuretics: HOLD on the day of surgery.    RECOMMENDATION:  APPROVAL GIVEN to proceed with proposed procedure, without further diagnostic evaluation.    Signed Electronically by: Rio Palacios MD    Copy of this evaluation report is provided to requesting physician.    [facility] Guidelines    Revised Cardiac Risk Index

## 2020-10-27 NOTE — PATIENT INSTRUCTIONS
"You will hold warfarin before surgery, the anticoagulation clinic will help coordinate this and give you the exact dates.  We'll contact cardiology to see if they have an opinion about using the Lovenox injections for bridging.     Do not take the Lasix the morning of surgery.    Preparing for Your Surgery  Getting started  A surgery nurse will call you to review your health history and instructions. They will give you an arrival time based on your scheduled surgery time.  Please be ready to share the following:    Your doctor's clinic name and phone number    Your medical, surgical and anesthesia history    A list of allergies and sensitivities    A list of medicines, including herbal treatments and over-the-counter drugs    Whether the patient has a legal guardian (ask how to send us the papers in advance)  If your child is having surgery, please ask for a copy of Preparing for Your Child's Surgery.    Preparing for surgery    Within 30 days of surgery: Have an exam at your family clinic (primary care clinic), or go to a pre-operative clinic. This exam is called a \"History and Physical,\" or H&P.    At your H&P exam, talk to your care team about all medicines you take. If you need to stop any medicines before surgery, ask when to start taking them again.  ? We do this for your safety. Many medicines can make you bleed too much during surgery. Some change how well surgery (anesthesia) drugs work.    Call your insurance company to see what it will and won't pay for. Ask if they need to pre-approve the surgery. (If no insurance, call [phone].)    Call your surgeon's clinic if there's any change in your health. This includes signs of a cold or flu (sore throat, runny nose, cough, rash, fever). It also includes a scrape or scratch near the surgery site.    If you have questions on the day of surgery, call your surgery center.  Eating and drinking guidelines  For your safety: Unless your surgeon tells you otherwise, " follow the guidelines below.    Eat and drink as usual until 8 hours before surgery. After that, no food or milk.    Drink clear liquids until 2 hours before surgery. These are liquids you can see through, like water, Gatorade and Propel Water. You may also have black coffee and tea (no cream or milk).    Nothing by mouth within 2 hours of surgery. This includes gum, candy and breath mints.    Stop alcohol the midnight before surgery.    If your family clinic tells you to take medicine on the morning of surgery, it's okay to take it with a sip of water.  Preventing infection    Shower or bathe the night before and morning of your surgery. Follow the instructions your clinic gave you. (If no instructions, use regular soap.)    Don't shave or clip hair near your surgery site. This can lead to skin infection.    Don't smoke the morning of surgery. Smoking increases the risk of infection. You may chew nicotine gum up to 2 hours before surgery. A nicotine patch is okay.  ? Note: Some surgeries require you to completely quit smoking and nicotine. Check with your surgeon.    Your care team will make every effort to keep you safe from infection. We will:  ? Clean our hands often with soap and water (or an alcohol-based hand rub).  ? Clean the skin at your surgery site with a special soap that kills germs. We'll also remove hair from the site as needed.  ? Wear special hair covers, masks, gowns and gloves during surgery.  ? Give antibiotic medicine, if prescribed. Not all surgeries need antibiotics.  What to bring on the day of surgery    Photo ID and insurance card    Copy of your health care directive, if you have one    Glasses and hearing aides (bring cases)  ? You can't wear contacts during surgery    Inhaler and eye drops, if you use them (tell us about these when you arrive)    CPAP machine or breathing device, if you use them    A few personal items, if spending the night    If you have . . .  ? A pacemaker or ICD  (cardiac defibrillator): Bring the ID card.  ? An implanted stimulator: Bring the remote control.  ? A legal guardian: Bring a copy of the certified (court-stamped) guardianship papers.  Please remove any jewelry, including body piercings. Leave jewelry and other valuables at home.  If you're going home the day of surgery  Important: If you don't follow the rules below, we must cancel your surgery.     Arrange for someone to drive you home after surgery. You may not drive, take a taxi or take public transportation by yourself (unless you'll have local anesthesia only).    Arrange for a responsible adult to stay with you overnight. If you don't, we may keep you in the hospital overnight, and you may need to pay the costs yourself.  Questions?   If you have any questions for your care team, list them here: _________________________________________________________________________________________________________________________________________________________________________________________________________________________________________________________________________________________________________________________  For informational purposes only. Not to replace the advice of your health care provider. Copyright   9104-2190 [facility]. All rights reserved. Clinically reviewed by Linda Hutson MD. [employer] [id number] - REV 07/19.

## 2020-10-27 NOTE — TELEPHONE ENCOUNTER
IMAN [address] office with [facility] is calling both PCP office and [facility]; ok to be off Coumadin 4 to 5 days and does not need to bridge with Lovenox per Dr. uMrdock. Krista spoke with IMAN Romo with DR. Murdock's Office and is calling to relay dictation.     Provider please review and advise. Thank you.

## 2020-10-27 NOTE — PROGRESS NOTES
2:57 PM     Writer left DVM with hold and dosing instructions with recheck date of 11/9/2020.    Sher Tadeo RN, BSN, PHN  Anticoagulation Clinic   [phone]

## 2020-10-27 NOTE — TELEPHONE ENCOUNTER
Great, thank you!  Can you please let Bernie know that Dr. Key says the Lovenox is not necessary.    Copying message to [facility] clinic as well to ask for their assistance coordinating her warfarin break 5 days before procedures, no bridging needed.    Thanks,  Rio Palacios MD

## 2020-11-04 ENCOUNTER — TRANSFERRED RECORDS (OUTPATIENT)
Dept: HEALTH INFORMATION MANAGEMENT | Facility: CLINIC | Age: 70
End: 2020-11-04

## 2020-11-11 ENCOUNTER — TRANSFERRED RECORDS (OUTPATIENT)
Dept: HEALTH INFORMATION MANAGEMENT | Facility: CLINIC | Age: 70
End: 2020-11-11

## 2020-11-14 ENCOUNTER — TRANSFERRED RECORDS (OUTPATIENT)
Dept: HEALTH INFORMATION MANAGEMENT | Facility: CLINIC | Age: 70
End: 2020-11-14

## 2020-11-15 ENCOUNTER — TRANSFERRED RECORDS (OUTPATIENT)
Dept: HEALTH INFORMATION MANAGEMENT | Facility: CLINIC | Age: 70
End: 2020-11-15

## 2020-11-17 ENCOUNTER — TELEPHONE (OUTPATIENT)
Dept: ANTICOAGULATION | Facility: CLINIC | Age: 70
End: 2020-11-17

## 2020-11-17 NOTE — TELEPHONE ENCOUNTER
ANTICOAGULATION     Radha Corona is overdue for INR check.      Left message for patient to call and schedule lab appointment as soon as possible. If returning call, please schedule.     Barb Barriga RN on 11/17/2020 at 3:34 PM

## 2020-12-01 ENCOUNTER — TELEPHONE (OUTPATIENT)
Dept: ANTICOAGULATION | Facility: CLINIC | Age: 70
End: 2020-12-01

## 2020-12-01 DIAGNOSIS — I63.9 CEREBRAL INFARCTION (H): ICD-10-CM

## 2020-12-01 DIAGNOSIS — I48.91 ATRIAL FIBRILLATION, UNSPECIFIED TYPE (H): ICD-10-CM

## 2020-12-01 DIAGNOSIS — I48.91 ATRIAL FIBRILLATION (H): ICD-10-CM

## 2020-12-01 NOTE — TELEPHONE ENCOUNTER
ANTICOAGULATION     Radha Corona is overdue for INR check.      Pt is going to be managed by [facility]. will discontinue from care.    Barb Barriga RN

## 2021-01-04 DIAGNOSIS — I48.20 CHRONIC ATRIAL FIBRILLATION (H): ICD-10-CM

## 2021-01-04 RX ORDER — WARFARIN SODIUM 2.5 MG/1
TABLET ORAL
Qty: 145 TABLET | Refills: 0 | OUTPATIENT
Start: 2021-01-04

## 2021-01-04 NOTE — TELEPHONE ENCOUNTER
Patient has established care with an [facility].     [facility] is no longer following patient, unable to send refill.

## 2021-01-04 NOTE — TELEPHONE ENCOUNTER
On 11/24/2020, pt did Establish Care at [facility] - Laurence Licona NP.    Susan SCHERER RN, BSN

## 2021-02-11 ENCOUNTER — TELEPHONE (OUTPATIENT)
Dept: FAMILY MEDICINE | Facility: CLINIC | Age: 71
End: 2021-02-11

## 2021-02-11 NOTE — TELEPHONE ENCOUNTER
Patient Quality Outreach Summary      Summary:     Patient is due/failing the following:   Colonoscopy    Type of outreach:    None, completed 11/04/2020    Questions for provider review:    None                                                                                                                    Peyton FIELD CMA       Chart routed to .

## 2021-04-20 DIAGNOSIS — R60.0 LOCALIZED EDEMA: ICD-10-CM

## 2021-04-21 RX ORDER — FUROSEMIDE 40 MG
TABLET ORAL
Qty: 90 TABLET | Refills: 1 | Status: SHIPPED | OUTPATIENT
Start: 2021-04-21 | End: 2021-11-04

## 2021-04-21 NOTE — TELEPHONE ENCOUNTER
"Requested Prescriptions   Pending Prescriptions Disp Refills     furosemide (LASIX) 40 MG tablet [Pharmacy Med Name: FUROSEMIDE 40MG TABLET] 90 tablet 3     Sig: TAKE 1 TABLET (40 MG) BY MOUTH DAILY AS NEEDED FOR EDEMA       Diuretics (Including Combos) Protocol Failed - 4/20/2021  9:05 AM        Failed - Normal serum creatinine on file in past 12 months     Recent Labs   Lab Test 08/15/19  1046   CR 0.89              Failed - Normal serum potassium on file in past 12 months     Recent Labs   Lab Test 08/15/19  1046   POTASSIUM 4.7                    Failed - Normal serum sodium on file in past 12 months     Recent Labs   Lab Test 08/15/19  1046                 Passed - Blood pressure under 140/90 in past 12 months     BP Readings from Last 3 Encounters:   10/27/20 114/68   09/14/20 116/65   09/14/20 102/64                 Passed - Recent (12 mo) or future (30 days) visit within the authorizing provider's specialty     Patient has had an office visit with the authorizing provider or a provider within the authorizing providers department within the previous 12 mos or has a future within next 30 days. See \"Patient Info\" tab in inbasket, or \"Choose Columns\" in Meds & Orders section of the refill encounter.              Passed - Medication is active on med list        Passed - Patient is age 18 or older        Passed - No active pregancy on record        Passed - No positive pregnancy test in past 12 months             "

## 2021-05-24 ENCOUNTER — RECORDS - HEALTHEAST (OUTPATIENT)
Dept: ADMINISTRATIVE | Facility: CLINIC | Age: 71
End: 2021-05-24

## 2021-05-29 ENCOUNTER — RECORDS - HEALTHEAST (OUTPATIENT)
Dept: ADMINISTRATIVE | Facility: CLINIC | Age: 71
End: 2021-05-29

## 2021-08-07 DIAGNOSIS — I48.0 PAROXYSMAL ATRIAL FIBRILLATION (H): ICD-10-CM

## 2021-08-10 RX ORDER — METOPROLOL TARTRATE 50 MG
TABLET ORAL
Qty: 180 TABLET | Refills: 3 | OUTPATIENT
Start: 2021-08-10

## 2021-08-18 DIAGNOSIS — I48.0 PAROXYSMAL ATRIAL FIBRILLATION (H): ICD-10-CM

## 2021-08-24 RX ORDER — METOPROLOL TARTRATE 50 MG
TABLET ORAL
Qty: 180 TABLET | Refills: 3 | OUTPATIENT
Start: 2021-08-24

## 2021-11-03 DIAGNOSIS — R60.0 LOCALIZED EDEMA: ICD-10-CM

## 2021-11-04 RX ORDER — FUROSEMIDE 40 MG
TABLET ORAL
Qty: 90 TABLET | Refills: 0 | Status: SHIPPED | OUTPATIENT
Start: 2021-11-04

## 2021-11-04 NOTE — TELEPHONE ENCOUNTER
Pending Prescriptions:                       Disp   Refills    furosemide (LASIX) 40 MG tablet [Pharmacy*90 tab*0            Sig: TAKE 1 TABLET (40 MG) BY MOUTH DAILY AS NEEDED           FOR EDEMA    Routing refill request to provider for review/approval because:  Labs not current:    BP Readings from Last 3 Encounters:   10/27/20 114/68   09/14/20 116/65   09/14/20 102/64     Creatinine   Date Value Ref Range Status   08/15/2019 0.89 0.52 - 1.04 mg/dL Final     Potassium   Date Value Ref Range Status   08/15/2019 4.7 3.4 - 5.3 mmol/L Final     Sodium   Date Value Ref Range Status   08/15/2019 139 133 - 144 mmol/L Final       Patient needs to be seen because it has been more than 1 year since last office visit.    Mello Caballero RN

## 2021-11-05 NOTE — TELEPHONE ENCOUNTER
Spoke with patient  She will call back to schedule a visit    Lisa Cartagena on 11/5/2021 at 1:00 PM

## 2021-11-12 DIAGNOSIS — I48.0 PAROXYSMAL ATRIAL FIBRILLATION (H): ICD-10-CM

## 2021-11-12 NOTE — TELEPHONE ENCOUNTER
Message: Pt enrolled in our Rx Med Sync service to improve adherence. We are requesting a refill authorization in advance to ensure an active prescription is on file.

## 2021-11-15 NOTE — TELEPHONE ENCOUNTER
Pending Prescriptions:                       Disp   Refills    metoprolol tartrate (LOPRESSOR) 50 MG tab*180 ta*3            Sig: TAKE 1 TABLET (50 MG) BY MOUTH TWICE A DAY    Routing refill request to provider for review/approval because:  Labs not current:    BP Readings from Last 3 Encounters:   10/27/20 114/68   09/14/20 116/65   09/14/20 102/64     No visit in last 12 mo. Transferred pt    Mello Caballero RN

## 2021-11-16 RX ORDER — METOPROLOL TARTRATE 50 MG
TABLET ORAL
Qty: 180 TABLET | Refills: 3 | OUTPATIENT
Start: 2021-11-16

## 2021-11-16 NOTE — TELEPHONE ENCOUNTER
Patient has transferred to [facility] and should follow-up with new provider for refills.    Thanks,  Rio Palacios MD

## 2021-11-18 NOTE — TELEPHONE ENCOUNTER
Called pt to adv request refill with new primary at [facility]. Pt agrees    Mello Caballero RN

## 2023-07-24 ENCOUNTER — ANESTHESIA (OUTPATIENT)
Dept: GASTROENTEROLOGY | Facility: CLINIC | Age: 73
End: 2023-07-24
Payer: COMMERCIAL

## 2023-07-24 ENCOUNTER — HOSPITAL ENCOUNTER (OUTPATIENT)
Facility: CLINIC | Age: 73
Discharge: HOME | End: 2023-07-24
Attending: INTERNAL MEDICINE | Admitting: INTERNAL MEDICINE
Payer: COMMERCIAL

## 2023-07-24 ENCOUNTER — ANESTHESIA EVENT (OUTPATIENT)
Dept: GASTROENTEROLOGY | Facility: CLINIC | Age: 73
End: 2023-07-24
Payer: COMMERCIAL

## 2023-07-24 VITALS
HEIGHT: 67 IN | BODY MASS INDEX: 34.53 KG/M2 | RESPIRATION RATE: 21 BRPM | SYSTOLIC BLOOD PRESSURE: 115 MMHG | HEART RATE: 74 BPM | OXYGEN SATURATION: 97 % | DIASTOLIC BLOOD PRESSURE: 86 MMHG | WEIGHT: 220 LBS

## 2023-07-24 LAB — COLONOSCOPY: NORMAL

## 2023-07-24 PROCEDURE — 250N000009 HC RX 250: Performed by: NURSE ANESTHETIST, CERTIFIED REGISTERED

## 2023-07-24 PROCEDURE — 45380 COLONOSCOPY AND BIOPSY: CPT | Performed by: INTERNAL MEDICINE

## 2023-07-24 PROCEDURE — 88305 TISSUE EXAM BY PATHOLOGIST: CPT | Mod: TC | Performed by: INTERNAL MEDICINE

## 2023-07-24 PROCEDURE — 370N000017 HC ANESTHESIA TECHNICAL FEE, PER MIN: Performed by: INTERNAL MEDICINE

## 2023-07-24 PROCEDURE — 250N000011 HC RX IP 250 OP 636: Mod: JZ | Performed by: NURSE ANESTHETIST, CERTIFIED REGISTERED

## 2023-07-24 PROCEDURE — 258N000003 HC RX IP 258 OP 636: Performed by: NURSE ANESTHETIST, CERTIFIED REGISTERED

## 2023-07-24 PROCEDURE — 999N000010 HC STATISTIC ANES STAT CODE-CRNA PER MINUTE: Performed by: INTERNAL MEDICINE

## 2023-07-24 RX ORDER — LIDOCAINE HYDROCHLORIDE 20 MG/ML
INJECTION, SOLUTION INFILTRATION; PERINEURAL PRN
Status: DISCONTINUED | OUTPATIENT
Start: 2023-07-24 | End: 2023-07-24

## 2023-07-24 RX ORDER — ONDANSETRON 2 MG/ML
INJECTION INTRAMUSCULAR; INTRAVENOUS PRN
Status: DISCONTINUED | OUTPATIENT
Start: 2023-07-24 | End: 2023-07-24

## 2023-07-24 RX ORDER — MAGNESIUM OXIDE 400 MG/1
400 TABLET ORAL PRN
COMMUNITY

## 2023-07-24 RX ORDER — EXEMESTANE 25 MG/1
25 TABLET ORAL
COMMUNITY
Start: 2023-06-27

## 2023-07-24 RX ORDER — SODIUM CHLORIDE, SODIUM LACTATE, POTASSIUM CHLORIDE, CALCIUM CHLORIDE 600; 310; 30; 20 MG/100ML; MG/100ML; MG/100ML; MG/100ML
INJECTION, SOLUTION INTRAVENOUS CONTINUOUS PRN
Status: DISCONTINUED | OUTPATIENT
Start: 2023-07-24 | End: 2023-07-24

## 2023-07-24 RX ORDER — ENOXAPARIN SODIUM 100 MG/ML
100 INJECTION SUBCUTANEOUS EVERY 12 HOURS
COMMUNITY

## 2023-07-24 RX ORDER — PROPOFOL 10 MG/ML
INJECTION, EMULSION INTRAVENOUS PRN
Status: DISCONTINUED | OUTPATIENT
Start: 2023-07-24 | End: 2023-07-24

## 2023-07-24 RX ORDER — PROPOFOL 10 MG/ML
INJECTION, EMULSION INTRAVENOUS CONTINUOUS PRN
Status: DISCONTINUED | OUTPATIENT
Start: 2023-07-24 | End: 2023-07-24

## 2023-07-24 RX ADMIN — LIDOCAINE HYDROCHLORIDE 50 MG: 20 INJECTION, SOLUTION INFILTRATION; PERINEURAL at 09:08

## 2023-07-24 RX ADMIN — SODIUM CHLORIDE, POTASSIUM CHLORIDE, SODIUM LACTATE AND CALCIUM CHLORIDE: 600; 310; 30; 20 INJECTION, SOLUTION INTRAVENOUS at 09:07

## 2023-07-24 RX ADMIN — PROPOFOL 150 MCG/KG/MIN: 10 INJECTION, EMULSION INTRAVENOUS at 09:08

## 2023-07-24 RX ADMIN — PROPOFOL 40 MG: 10 INJECTION, EMULSION INTRAVENOUS at 09:11

## 2023-07-24 RX ADMIN — LIDOCAINE HYDROCHLORIDE 50 MG: 20 INJECTION, SOLUTION INFILTRATION; PERINEURAL at 09:07

## 2023-07-24 RX ADMIN — PROPOFOL 30 MG: 10 INJECTION, EMULSION INTRAVENOUS at 09:08

## 2023-07-24 RX ADMIN — ONDANSETRON 4 MG: 2 INJECTION INTRAMUSCULAR; INTRAVENOUS at 09:08

## 2023-07-24 ASSESSMENT — ACTIVITIES OF DAILY LIVING (ADL): ADLS_ACUITY_SCORE: 35

## 2023-07-24 ASSESSMENT — ENCOUNTER SYMPTOMS
DYSRHYTHMIAS: 1
SEIZURES: 0

## 2023-07-24 ASSESSMENT — LIFESTYLE VARIABLES: TOBACCO_USE: 0

## 2023-07-24 NOTE — ANESTHESIA PREPROCEDURE EVALUATION
"Anesthesia Pre-Procedure Evaluation    Patient: Radha Corona   MRN: [id number] : 1950        Procedure : Procedure(s):  Colonoscopy          Past Medical History:   Diagnosis Date    Abscess of intestine 8/3/07    ptl colectomy    Basal cell carcinoma     Displacement of lumbar intervertebral disc without myelopathy 8/3/07    GERD (gastroesophageal reflux disease)     Malignant neoplasm of breast (female), unspecified site 8/3/07    Migraine, unspecified, without mention of intractable migraine without mention of status migrainosus     Other and unspecified hyperlipidemia 8/3/07      Past Surgical History:   Procedure Laterality Date    ARTHROSCOPY KNEE RT/LT  2005    BREAST LUMPECTOMY, RT/LT  06    Right Breast Lumpectomy    COLECTOMY      Reversal of Colostomy     D & C      HYSTERECTOMY, DIOMEDES  2005    [place] SPINE SURGERY PROCEDURE UNLISTED      Hx of Spine Surgery L4-5    ZZC ORAL SURGERY PROCEDURE      wisdom teeth extraction      Allergies   Allergen Reactions    Adhesive Tape Dermatitis     Blisters    Pcn [Penicillins] Hives    Simvastatin Cramps    Tetracycline Hives      Social History     Tobacco Use    Smoking status: Never    Smokeless tobacco: Never   Substance Use Topics    Alcohol use: Yes     Comment: wine occasionally      Wt Readings from Last 1 Encounters:   10/27/20 108.8 kg (239 lb 12.8 oz)        Anesthesia Evaluation   Pt has had prior anesthetic.     History of anesthetic complications  - PONV.      ROS/MED HX  ENT/Pulmonary:    (-) tobacco use, asthma and recent URI   Neurologic:     (+)    peripheral neuropathy (B/l feet),  migraines,    CVA (embolic stroke in . Residual \"LEFT facial drooping and slower cognition when tired\" per daughter),                   (-) no seizures   Cardiovascular: Comment: PFO      (+) Dyslipidemia hypertension- -   -  - -   Taking blood thinners (No missed doses)                     dysrhythmias, a-fib,             METS/Exercise " Tolerance: 3 - Able to walk 1-2 blocks without stopping Comment: 2/2 knee pain     Hematologic:     (+) History of blood clots,    pt is anticoagulated,           Musculoskeletal:       GI/Hepatic: Comment: Rectal bleeding    (+) GERD, Asymptomatic on medication,               (-) liver disease   Renal/Genitourinary:    (-) renal disease   Endo:     (+)  type II DM,             Obesity,       Psychiatric/Substance Use:       Infectious Disease:    (-) Recent Fever   Malignancy:   (+) Malignancy (H/o breast cancer s/p bilateral mastectomy),     Other:            Physical Exam    Airway        Mallampati: II   TM distance: > 3 FB   Neck ROM: full   Mouth opening: > 3 cm    Respiratory Devices and Support         Dental       (+) Minor Abnormalities - some fillings, tiny chips      Cardiovascular          Rhythm and rate: irregular and normal     Pulmonary           breath sounds clear to auscultation           OUTSIDE LABS:  CBC:   Lab Results   Component Value Date    WBC 5.2 10/27/2020    WBC 8.4 01/28/2013    HGB 14.4 10/27/2020    HGB 14.4 01/28/2013    HCT 44.7 10/27/2020    HCT 43.6 01/28/2013     10/27/2020     01/28/2013     BMP:   Lab Results   Component Value Date     08/15/2019     05/23/2016    POTASSIUM 4.7 08/15/2019    POTASSIUM 4.4 05/23/2016    CHLORIDE 105 08/15/2019    CHLORIDE 107 05/23/2016    CO2 29 08/15/2019    CO2 26 05/23/2016    BUN 20 08/15/2019    BUN 21 05/23/2016    CR 0.89 08/15/2019    CR 0.82 05/23/2016    GLC 94 08/15/2019     (H) 05/23/2016     COAGS:   Lab Results   Component Value Date    PTT 52 (H) 04/24/2009    INR 2.27 (H) 10/27/2020     POC: No results found for: BGM, HCG, HCGS  HEPATIC:   Lab Results   Component Value Date    ALBUMIN 4.4 01/28/2013    PROTTOTAL 7.3 01/28/2013    ALT 34 09/14/2020    AST 33 01/28/2013    ALKPHOS 46 01/28/2013    BILITOTAL 0.7 01/28/2013     OTHER:   Lab Results   Component Value Date    A1C 5.5 04/25/2008     DIONISIO 9.4 08/15/2019    PHOS 4.8 (H) 04/25/2009    MAG 1.8 04/25/2009    TSH 3.81 10/27/2020    T4 0.92 09/14/2020    T3 71 10/05/2010    CRP <5.0 11/21/2011    SED 7 11/21/2011       Anesthesia Plan    ASA Status:  3    NPO Status:  NPO Appropriate    Anesthesia Type: MAC.     - Reason for MAC: straight local not clinically adequate   Induction: Intravenous, Propofol.   Maintenance: TIVA.        Consents    Anesthesia Plan(s) and associated risks, benefits, and realistic alternatives discussed. Questions answered and patient/representative(s) expressed understanding.     - Discussed: Risks, Benefits and Alternatives for the PROCEDURE were discussed     - Discussed with:  Patient (adult daughter)            Postoperative Care       PONV prophylaxis: Ondansetron (or other 5HT-3)     Comments:                Cris Grande MD

## 2023-07-24 NOTE — ANESTHESIA POSTPROCEDURE EVALUATION
Patient: Radha Corona    Procedure: Procedure(s):  COLONOSCOPY, WITH POLYPECTOMY AND BIOPSY       Anesthesia Type:  MAC    Note:  Disposition: Outpatient   Postop Pain Control: Uneventful            Sign Out: Well controlled pain   PONV: No   Neuro/Psych: Uneventful            Sign Out: Acceptable/Baseline neuro status   Airway/Respiratory: Uneventful            Sign Out: Acceptable/Baseline resp. status   CV/Hemodynamics: Uneventful            Sign Out: Acceptable CV status; No obvious hypovolemia; No obvious fluid overload   Other NRE: NONE   DID A NON-ROUTINE EVENT OCCUR? No           Last vitals:  Vitals Value Taken Time   /86 07/24/23 1020   Temp     Pulse 73 07/24/23 1030   Resp 17 07/24/23 1031   SpO2 96 % 07/24/23 1030   Vitals shown include unvalidated device data.    Electronically Signed By: Cris Grande MD  July 24, 2023  4:41 PM

## 2023-07-24 NOTE — ANESTHESIA CARE TRANSFER NOTE
Patient: Radha Corona    Procedure: Procedure(s):  COLONOSCOPY, WITH POLYPECTOMY AND BIOPSY       Diagnosis: Rectal bleeding [K62.5]  Diagnosis Additional Information: No value filed.    Anesthesia Type:   MAC     Note:    Oropharynx: oropharynx clear of all foreign objects and spontaneously breathing  Level of Consciousness: awake  Oxygen Supplementation: face mask  Level of Supplemental Oxygen (L/min / FiO2): 6  Independent Airway: airway patency satisfactory and stable  Dentition: dentition unchanged  Vital Signs Stable: post-procedure vital signs reviewed and stable  Report to RN Given: handoff report given  Patient transferred to: PACU    Handoff Report: Identifed the Patient, Identified the Reponsible Provider, Reviewed the pertinent medical history, Discussed the surgical course, Reviewed Intra-OP anesthesia mangement and issues during anesthesia, Set expectations for post-procedure period and Allowed opportunity for questions and acknowledgement of understanding      Vitals:  Vitals Value Taken Time   /77 07/24/23 0941   Temp     Pulse 96 07/24/23 0943   Resp 18 07/24/23 0943   SpO2 95 % 07/24/23 0943   Vitals shown include unvalidated device data.    Electronically Signed By: JANICE Addison CRNA  July 24, 2023  9:44 AM

## 2023-07-25 LAB
PATH REPORT.COMMENTS IMP SPEC: NORMAL
PATH REPORT.COMMENTS IMP SPEC: NORMAL
PATH REPORT.FINAL DX SPEC: NORMAL
PATH REPORT.GROSS SPEC: NORMAL
PATH REPORT.MICROSCOPIC SPEC OTHER STN: NORMAL
PATH REPORT.RELEVANT HX SPEC: NORMAL
PHOTO IMAGE: NORMAL

## 2023-07-25 PROCEDURE — 88305 TISSUE EXAM BY PATHOLOGIST: CPT | Mod: 26 | Performed by: PATHOLOGY

## 2023-10-24 ENCOUNTER — OFFICE VISIT (OUTPATIENT)
Dept: DERMATOLOGY | Facility: CLINIC | Age: 73
End: 2023-10-24
Payer: COMMERCIAL

## 2023-10-24 DIAGNOSIS — L40.9 SCALP PSORIASIS: Primary | ICD-10-CM

## 2023-10-24 PROCEDURE — 99203 OFFICE O/P NEW LOW 30 MIN: CPT | Performed by: DERMATOLOGY

## 2023-10-24 RX ORDER — CLOBETASOL PROPIONATE 0.5 MG/ML
SOLUTION TOPICAL 2 TIMES DAILY
Qty: 100 ML | Refills: 6 | Status: SHIPPED | OUTPATIENT
Start: 2023-10-24

## 2023-10-24 NOTE — PROGRESS NOTES
Radha Corona is an extremely pleasant 73 year old year old female patient here today for rash on scalp.   .   Patient states this has been present for a while.  Patient reports the following symptoms:  itching.  Patient reports the following previous treatments topiclas.  These treatments did not work.  Patient reports the following modifying factors none.  Associated symptoms: none.  Patient has no other skin complaints today.  Remainder of the HPI, Meds, PMH, Allergies, FH, and SH was reviewed in chart.      Past Medical History:   Diagnosis Date    A-fib (H)     Abscess of intestine 08/03/2007    ptl colectomy    Basal cell carcinoma     Breast cancer (H)     Cerebral infarction (H)     Diabetes (H)     Displacement of lumbar intervertebral disc without myelopathy 08/03/2007    GERD (gastroesophageal reflux disease)     Heart disease     Hypertension     Malignant neoplasm of breast (female), unspecified site 08/03/2007    Migraine, unspecified, without mention of intractable migraine without mention of status migrainosus     Other and unspecified hyperlipidemia 08/03/2007    PFO (patent foramen ovale)        Past Surgical History:   Procedure Laterality Date    ARTHROSCOPY KNEE RT/LT  4/2005    BREAST LUMPECTOMY, RT/LT  2/7/06    Right Breast Lumpectomy    COLECTOMY      Reversal of Colostomy 8/05    COLONOSCOPY N/A 7/24/2023    Procedure: COLONOSCOPY, WITH POLYPECTOMY AND BIOPSY;  Surgeon: Иван Fong MD;  Location:  GI    D & C      HYSTERECTOMY, DIOMEDES  5/2005    [place] SPINE SURGERY PROCEDURE UNLISTED  2001    Hx of Spine Surgery L4-5    ZZC ORAL SURGERY PROCEDURE      wisdom teeth extraction        Family History   Problem Relation Age of Onset    Breast Cancer Mother     Diabetes Father     Hypertension Father     Lipids Father     Neurologic Disorder Daughter         Migraines    Neurologic Disorder Brother     Cancer No family hx of         no hx skin cancer    Melanoma No family hx of        Social  History     Socioeconomic History    Marital status:      Spouse name: Not on file    Number of children: Not on file    Years of education: Not on file    Highest education level: Not on file   Occupational History    Not on file   Tobacco Use    Smoking status: Never    Smokeless tobacco: Never   Vaping Use    Vaping Use: Never used   Substance and Sexual Activity    Alcohol use: Yes     Comment: rarely    Drug use: No    Sexual activity: Yes     Partners: Male   Other Topics Concern    Parent/sibling w/ CABG, MI or angioplasty before 65F 55M? No   Social History Narrative    Not on file     Social Determinants of Health     Financial Resource Strain: Not on file   Food Insecurity: Not on file   Transportation Needs: Not on file   Physical Activity: Not on file   Stress: Not on file   Social Connections: Not on file   Interpersonal Safety: Not on file   Housing Stability: Not on file       Outpatient Encounter Medications as of 10/24/2023   Medication Sig Dispense Refill    acetaminophen-codeine (TYLENOL WITH CODEINE #3) 300-30 MG per tablet Take 1-2 tablets by mouth daily as needed for pain 30 tablet 0    apixaban ANTICOAGULANT (ELIQUIS) 5 MG tablet Take 5 mg by mouth every 12 hours      atorvastatin (LIPITOR) 20 MG tablet TAKE 1 TABLET BY MOUTH DAILY 90 tablet 2    ciclopirox (LOPROX) 0.77 % cream Apply topically At Bedtime Behind ears 60 g 3    desonide (DESOWEN) 0.05 % external cream Apply topically daily Behind ears 60 g 3    enoxaparin ANTICOAGULANT (LOVENOX) 100 MG/ML syringe Inject 100 mg Subcutaneous every 12 hours      exemestane (AROMASIN) 25 MG tablet Take 25 mg by mouth      fluocinonide (LIDEX) 0.05 % external cream Apply sparingly to affected area twice daily as needed.  Do not apply to face. TO LEGS 120 g 3    furosemide (LASIX) 40 MG tablet TAKE 1 TABLET (40 MG) BY MOUTH DAILY AS NEEDED FOR EDEMA 90 tablet 0    levothyroxine (SYNTHROID/LEVOTHROID) 88 MCG tablet Take 1 tablet (88 mcg) by  mouth daily 90 tablet 3    magnesium oxide 400 MG tablet Take 400 mg by mouth as needed (W/ Cramping)      metoprolol tartrate (LOPRESSOR) 50 MG tablet TAKE 1 TABLET (50 MG) BY MOUTH TWICE A  tablet 3    ORDER FOR DME Equipment being ordered: insole Arch supports 1 Box 0    prednisoLONE acetate (PRED FORTE) 1 % ophthalmic suspension Place two drops in the right ear twice per day as needed (Patient not taking: Reported on 10/27/2020) 10 mL 3     No facility-administered encounter medications on file as of 10/24/2023.             O:   NAD, WDWN, Alert & Oriented, Mood & Affect wnl, Vitals stable   General appearance normal   Vitals stable   Alert, oriented and in no acute distress     BL pa scalp and temporal scalp red scaly plaques      Eyes: Conjunctivae/lids:Normal     ENT: Lips, buccal mucosa, tongue: normal    MSK:Normal    Cardiovascular: peripheral edema none    Pulm: Breathing Normal    Neuro/Psych: Orientation:Alert and Orientedx3 ; Mood/Affect:normal       A/P:  Scalp psoriasis  Pathophysiology discussed with pateint   She prefers topicals  Clobetasol twice daily  Return to clinic 2 months  Methotrexate discussed with patient   It was a pleasure speaking to Radha Corona today.  Previous clinic notes and pertinent laboratory tests were reviewed prior to Radha Corona's visit.

## 2023-10-24 NOTE — LETTER
10/24/2023         RE: Radha Corona  [id number] [facility] [id number]        Dear Colleague,    Thank you for referring your patient, Radha Corona, to the [facility]. Please see a copy of my visit note below.    Radha Corona is an extremely pleasant 73 year old year old female patient here today for rash on scalp.   .   Patient states this has been present for a while.  Patient reports the following symptoms:  itching.  Patient reports the following previous treatments topiclas.  These treatments did not work.  Patient reports the following modifying factors none.  Associated symptoms: none.  Patient has no other skin complaints today.  Remainder of the HPI, Meds, PMH, Allergies, FH, and SH was reviewed in chart.      Past Medical History:   Diagnosis Date     A-fib (H)      Abscess of intestine 08/03/2007    ptl colectomy     Basal cell carcinoma      Breast cancer (H)      Cerebral infarction (H)      Diabetes (H)      Displacement of lumbar intervertebral disc without myelopathy 08/03/2007     GERD (gastroesophageal reflux disease)      Heart disease      Hypertension      Malignant neoplasm of breast (female), unspecified site 08/03/2007     Migraine, unspecified, without mention of intractable migraine without mention of status migrainosus      Other and unspecified hyperlipidemia 08/03/2007     PFO (patent foramen ovale)        Past Surgical History:   Procedure Laterality Date     ARTHROSCOPY KNEE RT/LT  4/2005     BREAST LUMPECTOMY, RT/LT  2/7/06    Right Breast Lumpectomy     COLECTOMY      Reversal of Colostomy 8/05     COLONOSCOPY N/A 7/24/2023    Procedure: COLONOSCOPY, WITH POLYPECTOMY AND BIOPSY;  Surgeon: Иван Fong MD;  Location:  GI     D & C       HYSTERECTOMY, DIOMEDES  5/2005     [place] SPINE SURGERY PROCEDURE UNLISTED  2001    Hx of Spine Surgery L4-5     ZZC ORAL SURGERY PROCEDURE      wisdom teeth extraction        Family History   Problem  Relation Age of Onset     Breast Cancer Mother      Diabetes Father      Hypertension Father      Lipids Father      Neurologic Disorder Daughter         Migraines     Neurologic Disorder Brother      Cancer No family hx of         no hx skin cancer     Melanoma No family hx of        Social History     Socioeconomic History     Marital status:      Spouse name: Not on file     Number of children: Not on file     Years of education: Not on file     Highest education level: Not on file   Occupational History     Not on file   Tobacco Use     Smoking status: Never     Smokeless tobacco: Never   Vaping Use     Vaping Use: Never used   Substance and Sexual Activity     Alcohol use: Yes     Comment: rarely     Drug use: No     Sexual activity: Yes     Partners: Male   Other Topics Concern     Parent/sibling w/ CABG, MI or angioplasty before 65F 55M? No   Social History Narrative     Not on file     Social Determinants of Health     Financial Resource Strain: Not on file   Food Insecurity: Not on file   Transportation Needs: Not on file   Physical Activity: Not on file   Stress: Not on file   Social Connections: Not on file   Interpersonal Safety: Not on file   Housing Stability: Not on file       Outpatient Encounter Medications as of 10/24/2023   Medication Sig Dispense Refill     acetaminophen-codeine (TYLENOL WITH CODEINE #3) 300-30 MG per tablet Take 1-2 tablets by mouth daily as needed for pain 30 tablet 0     apixaban ANTICOAGULANT (ELIQUIS) 5 MG tablet Take 5 mg by mouth every 12 hours       atorvastatin (LIPITOR) 20 MG tablet TAKE 1 TABLET BY MOUTH DAILY 90 tablet 2     ciclopirox (LOPROX) 0.77 % cream Apply topically At Bedtime Behind ears 60 g 3     desonide (DESOWEN) 0.05 % external cream Apply topically daily Behind ears 60 g 3     enoxaparin ANTICOAGULANT (LOVENOX) 100 MG/ML syringe Inject 100 mg Subcutaneous every 12 hours       exemestane (AROMASIN) 25 MG tablet Take 25 mg by mouth        fluocinonide (LIDEX) 0.05 % external cream Apply sparingly to affected area twice daily as needed.  Do not apply to face. TO LEGS 120 g 3     furosemide (LASIX) 40 MG tablet TAKE 1 TABLET (40 MG) BY MOUTH DAILY AS NEEDED FOR EDEMA 90 tablet 0     levothyroxine (SYNTHROID/LEVOTHROID) 88 MCG tablet Take 1 tablet (88 mcg) by mouth daily 90 tablet 3     magnesium oxide 400 MG tablet Take 400 mg by mouth as needed (W/ Cramping)       metoprolol tartrate (LOPRESSOR) 50 MG tablet TAKE 1 TABLET (50 MG) BY MOUTH TWICE A  tablet 3     ORDER FOR DME Equipment being ordered: insole Arch supports 1 Box 0     prednisoLONE acetate (PRED FORTE) 1 % ophthalmic suspension Place two drops in the right ear twice per day as needed (Patient not taking: Reported on 10/27/2020) 10 mL 3     No facility-administered encounter medications on file as of 10/24/2023.             O:   NAD, WDWN, Alert & Oriented, Mood & Affect wnl, Vitals stable   General appearance normal   Vitals stable   Alert, oriented and in no acute distress     BL pa scalp and temporal scalp red scaly plaques      Eyes: Conjunctivae/lids:Normal     ENT: Lips, buccal mucosa, tongue: normal    MSK:Normal    Cardiovascular: peripheral edema none    Pulm: Breathing Normal    Neuro/Psych: Orientation:Alert and Orientedx3 ; Mood/Affect:normal       A/P:  Scalp psoriasis  Pathophysiology discussed with pateint   She prefers topicals  Clobetasol twice daily  Return to clinic 2 months  Methotrexate discussed with patient   It was a pleasure speaking to Radha Corona today.  Previous clinic notes and pertinent laboratory tests were reviewed prior to Radha Corona's visit.      Again, thank you for allowing me to participate in the care of your patient.        Sincerely,        Edy Dove MD